# Patient Record
Sex: MALE | Race: WHITE | NOT HISPANIC OR LATINO | Employment: FULL TIME | URBAN - METROPOLITAN AREA
[De-identification: names, ages, dates, MRNs, and addresses within clinical notes are randomized per-mention and may not be internally consistent; named-entity substitution may affect disease eponyms.]

---

## 2018-02-26 RX ORDER — MONTELUKAST SODIUM 10 MG/1
10 TABLET ORAL NIGHTLY
Qty: 90 TABLET | Refills: 0 | Status: SHIPPED | OUTPATIENT
Start: 2018-02-26 | End: 2018-03-28

## 2018-02-27 ENCOUNTER — TELEPHONE (OUTPATIENT)
Dept: INTERNAL MEDICINE | Facility: CLINIC | Age: 53
End: 2018-02-27

## 2018-02-27 NOTE — TELEPHONE ENCOUNTER
----- Message from Kyle Burkett MA sent at 2/27/2018 12:21 PM CST -----      ----- Message -----  From: Tomeka Tong  Sent: 2/27/2018  10:23 AM  To: Jovanny Arredondo Staff    Colonoscopy 2/27/18

## 2018-03-06 ENCOUNTER — TELEPHONE (OUTPATIENT)
Dept: INTERNAL MEDICINE | Facility: CLINIC | Age: 53
End: 2018-03-06

## 2018-03-21 ENCOUNTER — TELEPHONE (OUTPATIENT)
Dept: INTERNAL MEDICINE | Facility: CLINIC | Age: 53
End: 2018-03-21

## 2018-03-21 RX ORDER — OSELTAMIVIR PHOSPHATE 75 MG/1
75 CAPSULE ORAL DAILY
Qty: 10 CAPSULE | Refills: 0 | Status: SHIPPED | OUTPATIENT
Start: 2018-03-21 | End: 2018-03-27

## 2018-03-21 NOTE — TELEPHONE ENCOUNTER
Patient daughter has the flu and he [place] and want to know if you would send him a rx to [facility] in [place] for tamiflu.

## 2018-03-26 RX ORDER — FLUTICASONE PROPIONATE 50 MCG
SPRAY, SUSPENSION (ML) NASAL
Refills: 1 | COMMUNITY
Start: 2018-01-08 | End: 2019-03-04 | Stop reason: SDUPTHER

## 2018-03-26 RX ORDER — SIMVASTATIN 40 MG/1
40 TABLET, FILM COATED ORAL NIGHTLY
COMMUNITY
End: 2018-03-27 | Stop reason: SDUPTHER

## 2018-03-26 RX ORDER — MULTIVITAMIN
1 TABLET ORAL DAILY
COMMUNITY
End: 2021-05-27

## 2018-03-26 RX ORDER — OMEGA-3-ACID ETHYL ESTERS 1 G/1
1 CAPSULE, LIQUID FILLED ORAL DAILY
COMMUNITY
Start: 2018-03-03 | End: 2018-07-19 | Stop reason: SDUPTHER

## 2018-03-26 RX ORDER — MINERAL OIL
180 ENEMA (ML) RECTAL DAILY
COMMUNITY
End: 2020-07-24 | Stop reason: SDUPTHER

## 2018-03-26 RX ORDER — GUAIFENESIN/PHENYLPROPANOLAMIN
1000 EXPECTORANT ORAL DAILY
COMMUNITY
End: 2018-09-17

## 2018-03-27 ENCOUNTER — OFFICE VISIT (OUTPATIENT)
Dept: INTERNAL MEDICINE | Facility: CLINIC | Age: 53
End: 2018-03-27
Payer: COMMERCIAL

## 2018-03-27 VITALS
OXYGEN SATURATION: 95 % | WEIGHT: 202 LBS | SYSTOLIC BLOOD PRESSURE: 120 MMHG | TEMPERATURE: 98 F | HEIGHT: 65 IN | BODY MASS INDEX: 33.66 KG/M2 | DIASTOLIC BLOOD PRESSURE: 80 MMHG | HEART RATE: 68 BPM

## 2018-03-27 DIAGNOSIS — R73.01 IMPAIRED FASTING GLUCOSE: ICD-10-CM

## 2018-03-27 DIAGNOSIS — Z23 NEED FOR PROPHYLACTIC VACCINATION WITH COMBINED DIPHTHERIA-TETANUS-PERTUSSIS (DTP) VACCINE: ICD-10-CM

## 2018-03-27 DIAGNOSIS — E78.2 MIXED HYPERLIPIDEMIA: Primary | ICD-10-CM

## 2018-03-27 DIAGNOSIS — R74.8 ABNORMAL LIVER ENZYMES: ICD-10-CM

## 2018-03-27 PROBLEM — I10 HYPERTENSION: Status: ACTIVE | Noted: 2018-03-27

## 2018-03-27 PROBLEM — E78.5 HYPERLIPIDEMIA: Status: ACTIVE | Noted: 2018-03-27

## 2018-03-27 PROBLEM — J30.9 ALLERGIC RHINITIS: Status: ACTIVE | Noted: 2018-03-27

## 2018-03-27 PROBLEM — N41.9 PROSTATITIS: Status: RESOLVED | Noted: 2018-03-27 | Resolved: 2018-03-27

## 2018-03-27 PROBLEM — I10 HYPERTENSION: Status: RESOLVED | Noted: 2018-03-27 | Resolved: 2018-03-27

## 2018-03-27 PROCEDURE — 90471 IMMUNIZATION ADMIN: CPT | Mod: ,,, | Performed by: INTERNAL MEDICINE

## 2018-03-27 PROCEDURE — 90715 TDAP VACCINE 7 YRS/> IM: CPT | Mod: ,,, | Performed by: INTERNAL MEDICINE

## 2018-03-27 PROCEDURE — 99213 OFFICE O/P EST LOW 20 MIN: CPT | Mod: 25,,, | Performed by: INTERNAL MEDICINE

## 2018-03-27 RX ORDER — SIMVASTATIN 40 MG/1
40 TABLET, FILM COATED ORAL NIGHTLY
Qty: 90 TABLET | Refills: 1 | Status: SHIPPED | OUTPATIENT
Start: 2018-03-27 | End: 2018-12-10 | Stop reason: SDUPTHER

## 2018-03-27 NOTE — PROGRESS NOTES
Subjective:       Patient ID: Neo Aguilar is a 52 y.o. male.    Chief Complaint: Establish Care (continuity of care, med refill); Hyperlipidemia; and Hypertension    Hyperlipidemia   This is a chronic problem. The current episode started more than 1 year ago. The problem is controlled. Recent lipid tests were reviewed and are normal. Pertinent negatives include no chest pain, myalgias or shortness of breath. Current antihyperlipidemic treatment includes statins. The current treatment provides significant improvement of lipids. There are no compliance problems.  Risk factors for coronary artery disease include male sex, obesity and dyslipidemia.     Review of Systems   Constitutional: Negative for chills, diaphoresis, fatigue, fever and unexpected weight change.   HENT: Positive for voice change. Negative for congestion, ear discharge, ear pain, hearing loss, postnasal drip, rhinorrhea and trouble swallowing.    Eyes: Negative for photophobia, pain, discharge, redness, itching and visual disturbance.   Respiratory: Negative for apnea, cough, choking, chest tightness, shortness of breath and wheezing.    Cardiovascular: Negative for chest pain, palpitations, orthopnea, leg swelling and PND.   Gastrointestinal: Negative for abdominal pain, blood in stool, constipation, diarrhea, nausea, rectal pain and vomiting.   Endocrine: Negative for cold intolerance, heat intolerance, polydipsia and polyuria.   Genitourinary: Negative for decreased urine volume, difficulty urinating, discharge, dysuria, flank pain, frequency, genital sores, hematuria, penile pain, penile swelling, scrotal swelling, testicular pain and urgency.   Musculoskeletal: Negative for arthralgias, back pain, gait problem, joint swelling, myalgias, neck pain and neck stiffness.   Skin: Negative for color change, rash and wound.   Allergic/Immunologic: Negative for environmental allergies and food allergies.   Neurological: Negative for dizziness,  tremors, seizures, syncope, facial asymmetry, speech difficulty, weakness, light-headedness, numbness and headaches.   Hematological: Negative for adenopathy. Does not bruise/bleed easily.   Psychiatric/Behavioral: Negative for confusion, hallucinations, sleep disturbance and suicidal ideas. The patient is not nervous/anxious.        Past Medical History:   Diagnosis Date    Abnormal liver enzymes     Allergic rhinitis     Hyperlipidemia     Hypertension     Prostatitis       Past Surgical History:   Procedure Laterality Date    COLONOSCOPY  02/27/2018       Family History   Problem Relation Age of Onset    Coronary artery disease Father      >55    Cystic fibrosis Son        Social History     Social History    Marital status:      Spouse name: N/A    Number of children: N/A    Years of education: N/A     Occupational History    offshore       Social History Main Topics    Smoking status: Former Smoker    Smokeless tobacco: Never Used    Alcohol use Yes      Comment: occasional    Drug use: No    Sexual activity: Yes     Partners: Female      Comment:      Other Topics Concern    None     Social History Narrative    Live with wife       Current Outpatient Prescriptions   Medication Sig Dispense Refill    fexofenadine (ALLEGRA) 180 MG tablet Take 180 mg by mouth once daily.      fluticasone (FLONASE) 50 mcg/actuation nasal spray INHALE 1 SPRAY(S) EVERY DAY BY INTRANASAL ROUTE.  1    Lactobacillus rhamnosus GG (CULTURELLE) 10 billion cell capsule Take 1 capsule by mouth once daily.      montelukast (SINGULAIR) 10 mg tablet Take 1 tablet (10 mg total) by mouth every evening. 90 tablet 0    multivitamin (ONE DAILY MULTIVITAMIN) per tablet Take 1 tablet by mouth once daily.      omega-3 acid ethyl esters (LOVAZA) 1 gram capsule Take 1 capsule by mouth once daily.      saw palmetto 500 MG capsule Take 1,000 mg by mouth once daily.      simvastatin (ZOCOR) 40 MG tablet  "Take 1 tablet (40 mg total) by mouth every evening. 90 tablet 1     No current facility-administered medications for this visit.        Review of patient's allergies indicates:  No Known Allergies  Objective:    HPI     Establish Care    Additional comments: continuity of care, med refill       Last edited by Kyle Burkett MA on 3/27/2018  9:11 AM. (History)      Blood pressure 120/80, pulse 68, temperature 98.2 °F (36.8 °C), temperature source Temporal, height 5' 5" (1.651 m), weight 91.6 kg (202 lb), SpO2 95 %. Body mass index is 33.61 kg/m².   Physical Exam   Constitutional: He appears well-developed. No distress.   HENT:   Nose: Nose normal.   Mouth/Throat: Oropharynx is clear and moist.   Eyes: Conjunctivae are normal. Right eye exhibits no discharge. Left eye exhibits no discharge. No scleral icterus.   Neck: Carotid bruit is not present.   Cardiovascular: Normal rate, regular rhythm and normal heart sounds.    No murmur heard.  Pulmonary/Chest: Effort normal and breath sounds normal. No respiratory distress. He has no decreased breath sounds. He has no wheezes. He has no rhonchi. He has no rales.   Abdominal: Soft. He exhibits no distension. There is no tenderness. There is no rebound and no guarding.   Musculoskeletal: He exhibits no edema.   Neurological: He is alert. He displays no tremor.   Skin: Skin is warm and dry.   Psychiatric: He has a normal mood and affect. His speech is normal.   Nursing note and vitals reviewed.          Assessment:       1. Mixed hyperlipidemia    2. Impaired fasting glucose    3. Abnormal liver enzymes    4. Need for prophylactic vaccination with combined diphtheria-tetanus-pertussis (DTP) vaccine        Plan:       Neo was seen today for establish care, hyperlipidemia and hypertension.    Diagnoses and all orders for this visit:    Mixed hyperlipidemia  -     simvastatin (ZOCOR) 40 MG tablet; Take 1 tablet (40 mg total) by mouth every evening.  -     Comprehensive " metabolic panel; Future  -     Lipid panel; Future  -     Comprehensive metabolic panel  -     Lipid panel    Impaired fasting glucose  Comments:  Has already made some diet changes.  Recheck with A1C next visit    Orders:  -     Hemoglobin A1c; Future  -     Hemoglobin A1c    Abnormal liver enzymes  Comments:  Mildly elevated for years.Probably medication or fatty liver related.  In review of old chart, I don't see a hepatitis screen so will plan that with next labs    Orders:  -     Hepatitis panel, acute; Future  -     Hepatitis panel, acute    Need for prophylactic vaccination with combined diphtheria-tetanus-pertussis (DTP) vaccine  -     Tdap Vaccine

## 2018-07-19 RX ORDER — OMEGA-3-ACID ETHYL ESTERS 1 G/1
2 CAPSULE, LIQUID FILLED ORAL 2 TIMES DAILY
Qty: 120 CAPSULE | Refills: 6 | Status: SHIPPED | OUTPATIENT
Start: 2018-07-19 | End: 2018-08-13 | Stop reason: SDUPTHER

## 2018-07-24 ENCOUNTER — TELEPHONE (OUTPATIENT)
Dept: INTERNAL MEDICINE | Facility: CLINIC | Age: 53
End: 2018-07-24

## 2018-07-24 LAB
ALBUMIN SERPL-MCNC: 4.8 G/DL (ref 3.5–5.5)
ALBUMIN/GLOB SERPL: 2 {RATIO} (ref 1.2–2.2)
ALP SERPL-CCNC: 75 IU/L (ref 39–117)
ALT SERPL-CCNC: 44 IU/L (ref 0–44)
AST SERPL-CCNC: 28 IU/L (ref 0–40)
BILIRUB SERPL-MCNC: 0.6 MG/DL (ref 0–1.2)
BUN SERPL-MCNC: 16 MG/DL (ref 6–24)
BUN/CREAT SERPL: 15 (ref 9–20)
CALCIUM SERPL-MCNC: 9.3 MG/DL (ref 8.7–10.2)
CHLORIDE SERPL-SCNC: 103 MMOL/L (ref 96–106)
CHOLEST SERPL-MCNC: 146 MG/DL (ref 100–199)
CO2 SERPL-SCNC: 26 MMOL/L (ref 20–29)
CREAT SERPL-MCNC: 1.06 MG/DL (ref 0.76–1.27)
EGFR IF AFRICAN AMERICAN: 93 ML/MIN/1.73
EST. GFR  (NON AFRICAN AMERICAN): 81 ML/MIN/1.73
GLOBULIN SER CALC-MCNC: 2.4 G/DL (ref 1.5–4.5)
GLUCOSE SERPL-MCNC: 94 MG/DL (ref 65–99)
HAV IGM SERPL QL IA: NEGATIVE
HBA1C MFR BLD: 5.6 % (ref 4.8–5.6)
HBV CORE IGM SERPL QL IA: NEGATIVE
HBV SURFACE AG SERPL QL IA: NEGATIVE
HCV AB S/CO SERPL IA: <0.1 S/CO RATIO (ref 0–0.9)
HDLC SERPL-MCNC: 44 MG/DL
LDLC SERPL CALC-MCNC: 70 MG/DL (ref 0–99)
POTASSIUM SERPL-SCNC: 4.9 MMOL/L (ref 3.5–5.2)
PROT SERPL-MCNC: 7.2 G/DL (ref 6–8.5)
SODIUM SERPL-SCNC: 145 MMOL/L (ref 134–144)
TRIGL SERPL-MCNC: 158 MG/DL (ref 0–149)
VLDLC SERPL CALC-MCNC: 32 MG/DL (ref 5–40)

## 2018-07-24 NOTE — TELEPHONE ENCOUNTER
----- Message from Jovanny Arredondo Jr., MD sent at 7/24/2018  2:17 PM CDT -----  I have reviewed your results.  They demonstrate no abnormal findings.  If you have any additional concerns regarding these tests, please contact me at your convenience.

## 2018-08-13 ENCOUNTER — OFFICE VISIT (OUTPATIENT)
Dept: INTERNAL MEDICINE | Facility: CLINIC | Age: 53
End: 2018-08-13
Payer: COMMERCIAL

## 2018-08-13 VITALS
SYSTOLIC BLOOD PRESSURE: 150 MMHG | DIASTOLIC BLOOD PRESSURE: 108 MMHG | OXYGEN SATURATION: 98 % | BODY MASS INDEX: 35.32 KG/M2 | TEMPERATURE: 98 F | HEIGHT: 65 IN | HEART RATE: 73 BPM | WEIGHT: 212 LBS

## 2018-08-13 DIAGNOSIS — R03.0 ELEVATED BLOOD PRESSURE READING IN OFFICE WITHOUT DIAGNOSIS OF HYPERTENSION: ICD-10-CM

## 2018-08-13 DIAGNOSIS — E66.9 CLASS 2 OBESITY WITH BODY MASS INDEX (BMI) OF 35.0 TO 35.9 IN ADULT, UNSPECIFIED OBESITY TYPE, UNSPECIFIED WHETHER SERIOUS COMORBIDITY PRESENT: ICD-10-CM

## 2018-08-13 DIAGNOSIS — E78.2 MIXED HYPERLIPIDEMIA: Primary | ICD-10-CM

## 2018-08-13 DIAGNOSIS — N40.1 BENIGN PROSTATIC HYPERPLASIA WITH NOCTURIA: ICD-10-CM

## 2018-08-13 DIAGNOSIS — R35.1 BENIGN PROSTATIC HYPERPLASIA WITH NOCTURIA: ICD-10-CM

## 2018-08-13 PROCEDURE — 3080F DIAST BP >= 90 MM HG: CPT | Mod: ,,, | Performed by: INTERNAL MEDICINE

## 2018-08-13 PROCEDURE — 99214 OFFICE O/P EST MOD 30 MIN: CPT | Mod: ,,, | Performed by: INTERNAL MEDICINE

## 2018-08-13 PROCEDURE — 3077F SYST BP >= 140 MM HG: CPT | Mod: ,,, | Performed by: INTERNAL MEDICINE

## 2018-08-13 PROCEDURE — 3008F BODY MASS INDEX DOCD: CPT | Mod: ,,, | Performed by: INTERNAL MEDICINE

## 2018-08-13 RX ORDER — MONTELUKAST SODIUM 10 MG/1
10 TABLET ORAL NIGHTLY
Qty: 90 TABLET | Refills: 1 | Status: SHIPPED | OUTPATIENT
Start: 2018-08-13 | End: 2019-02-27 | Stop reason: SDUPTHER

## 2018-08-13 RX ORDER — OMEGA-3-ACID ETHYL ESTERS 1 G/1
2 CAPSULE, LIQUID FILLED ORAL 2 TIMES DAILY
Qty: 120 CAPSULE | Refills: 6 | Status: SHIPPED | OUTPATIENT
Start: 2018-08-13 | End: 2018-12-03 | Stop reason: SDUPTHER

## 2018-08-13 RX ORDER — TAMSULOSIN HYDROCHLORIDE 0.4 MG/1
0.4 CAPSULE ORAL DAILY
Qty: 30 CAPSULE | Refills: 3 | Status: SHIPPED | OUTPATIENT
Start: 2018-08-13 | End: 2018-09-17

## 2018-08-13 RX ORDER — MONTELUKAST SODIUM 10 MG/1
10 TABLET ORAL NIGHTLY
COMMUNITY
End: 2018-08-13 | Stop reason: SDUPTHER

## 2018-08-13 NOTE — PROGRESS NOTES
Subjective:       Patient ID: Neo Aguilar is a 53 y.o. male.    Chief Complaint: Hyperlipidemia (continuity of care  and med refill)            Hyperlipidemia   This is a chronic problem. The current episode started more than 1 year ago. The problem is controlled. Recent lipid tests were reviewed and are normal. Pertinent negatives include no chest pain, myalgias or shortness of breath. Current antihyperlipidemic treatment includes statins and diet change (fish oil). The current treatment provides significant improvement of lipids. There are no compliance problems.  Risk factors for coronary artery disease include male sex, obesity and dyslipidemia.   Benign Prostatic Hypertrophy   This is a chronic problem. The current episode started more than 1 year ago. The problem has been gradually worsening since onset. Irritative symptoms include nocturia (3-4 times per night) and urgency. Irritative symptoms do not include frequency. Obstructive symptoms include incomplete emptying, a slower stream and a weak stream. Obstructive symptoms do not include dribbling, an intermittent stream or straining. Pertinent negatives include no chills, dysuria, hematuria, hesitancy, nausea or vomiting. AUA score is 8-19. He is sexually active. The symptoms are aggravated by caffeine (tea). Treatments tried: saw palmetto. The treatment provided mild relief.     Review of Systems   Constitutional: Negative for chills, fatigue, fever and unexpected weight change.   HENT: Negative for congestion, hearing loss, postnasal drip, rhinorrhea, trouble swallowing and voice change.    Eyes: Negative for photophobia and visual disturbance.   Respiratory: Negative for apnea, cough, choking, chest tightness, shortness of breath and wheezing.    Cardiovascular: Negative for chest pain, palpitations and leg swelling.   Gastrointestinal: Negative for abdominal pain, blood in stool, constipation, diarrhea, nausea, rectal pain and vomiting.   Endocrine:  Negative for cold intolerance, heat intolerance, polydipsia and polyuria.   Genitourinary: Positive for incomplete emptying, nocturia (3-4 times per night) and urgency. Negative for decreased urine volume, difficulty urinating, discharge, dysuria, flank pain, frequency, genital sores, hematuria, hesitancy and testicular pain.   Musculoskeletal: Negative for arthralgias, back pain, gait problem, joint swelling, myalgias and neck pain.   Skin: Negative for color change, rash and wound.   Allergic/Immunologic: Negative for environmental allergies and food allergies.   Neurological: Negative for dizziness, tremors, seizures, syncope, facial asymmetry, speech difficulty, weakness, light-headedness, numbness and headaches.   Hematological: Negative for adenopathy. Does not bruise/bleed easily.   Psychiatric/Behavioral: Negative for confusion, hallucinations, sleep disturbance and suicidal ideas. The patient is not nervous/anxious.        Past Medical History:   Diagnosis Date    Abnormal liver enzymes     Allergic rhinitis     Hyperlipidemia     Hypertension     Prostatitis       Past Surgical History:   Procedure Laterality Date    COLONOSCOPY  02/27/2018       Family History   Problem Relation Age of Onset    Coronary artery disease Father         >55    Cystic fibrosis Son        Social History     Socioeconomic History    Marital status:      Spouse name: None    Number of children: None    Years of education: None    Highest education level: None   Social Needs    Financial resource strain: None    Food insecurity - worry: None    Food insecurity - inability: None    Transportation needs - medical: None    Transportation needs - non-medical: None   Occupational History    Occupation: offshore    Tobacco Use    Smoking status: Former Smoker    Smokeless tobacco: Never Used   Substance and Sexual Activity    Alcohol use: Yes     Comment: occasional    Drug use: No    Sexual  "activity: Yes     Partners: Female     Comment:    Other Topics Concern    None   Social History Narrative    Live with wife       Current Outpatient Medications   Medication Sig Dispense Refill    fexofenadine (ALLEGRA) 180 MG tablet Take 180 mg by mouth once daily.      fluticasone (FLONASE) 50 mcg/actuation nasal spray INHALE 1 SPRAY(S) EVERY DAY BY INTRANASAL ROUTE.  1    Lactobacillus rhamnosus GG (CULTURELLE) 10 billion cell capsule Take 1 capsule by mouth once daily.      montelukast (SINGULAIR) 10 mg tablet Take 1 tablet (10 mg total) by mouth every evening. 90 tablet 1    multivitamin (ONE DAILY MULTIVITAMIN) per tablet Take 1 tablet by mouth once daily.      omega-3 acid ethyl esters (LOVAZA) 1 gram capsule Take 2 capsules (2 g total) by mouth 2 (two) times daily. 120 capsule 6    saw palmetto 500 MG capsule Take 1,000 mg by mouth once daily.      simvastatin (ZOCOR) 40 MG tablet Take 1 tablet (40 mg total) by mouth every evening. 90 tablet 1    tamsulosin (FLOMAX) 0.4 mg Cap Take 1 capsule (0.4 mg total) by mouth once daily. 30 capsule 3     No current facility-administered medications for this visit.        Review of patient's allergies indicates:  No Known Allergies  Objective:    HPI     Hyperlipidemia      Additional comments: continuity of care  and med refill          Last edited by Kyle Burkett MA on 8/13/2018  9:29 AM. (History)      Blood pressure (!) 150/108, pulse 73, temperature 97.9 °F (36.6 °C), temperature source Temporal, height 5' 5" (1.651 m), weight 96.2 kg (212 lb), SpO2 98 %. Body mass index is 35.28 kg/m².   Physical Exam   Constitutional: He appears well-developed. No distress.   Obese     HENT:   Nose: Nose normal.   Mouth/Throat: Oropharynx is clear and moist.   Eyes: Conjunctivae are normal. Right eye exhibits no discharge. Left eye exhibits no discharge. No scleral icterus.   Neck: Carotid bruit is not present.   Cardiovascular: Normal rate, regular rhythm " and normal heart sounds.   No murmur heard.  Pulmonary/Chest: Effort normal and breath sounds normal. No respiratory distress. He has no decreased breath sounds. He has no wheezes. He has no rhonchi. He has no rales.   Abdominal: Soft. He exhibits no distension. There is no tenderness. There is no rebound and no guarding.   Musculoskeletal: He exhibits no edema.   Neurological: He is alert. He displays no tremor.   Skin: Skin is warm and dry.   Psychiatric: He has a normal mood and affect. His speech is normal.   Nursing note and vitals reviewed.        No visits with results within 1 Month(s) from this visit.   Latest known visit with results is:   Office Visit on 03/27/2018   Component Date Value Ref Range Status    Hep A IgM 07/23/2018 Negative  Negative Final    Hepatitis B Surface Ag 07/23/2018 Negative  Negative Final    Hep B C IgM 07/23/2018 Negative  Negative Final    Hep C Virus Ab Signal/Cutoff 07/23/2018 <0.1  0.0 - 0.9 s/co ratio Final    Comment:                                   Negative:     < 0.8                               Indeterminate: 0.8 - 0.9                                    Positive:     > 0.9   The CDC recommends that a positive HCV antibody result   be followed up with a HCV Nucleic Acid Amplification   test (560159).      Glucose 07/23/2018 94  65 - 99 mg/dL Final    BUN, Bld 07/23/2018 16  6 - 24 mg/dL Final    Creatinine 07/23/2018 1.06  0.76 - 1.27 mg/dL Final    eGFR if non  07/23/2018 81  >59 mL/min/1.73 Final    eGFR if  07/23/2018 93  >59 mL/min/1.73 Final    BUN/Creatinine Ratio 07/23/2018 15  9 - 20 Final    Sodium 07/23/2018 145* 134 - 144 mmol/L Final    Potassium 07/23/2018 4.9  3.5 - 5.2 mmol/L Final    Chloride 07/23/2018 103  96 - 106 mmol/L Final    CO2 07/23/2018 26  20 - 29 mmol/L Final    Calcium 07/23/2018 9.3  8.7 - 10.2 mg/dL Final    Total Protein 07/23/2018 7.2  6.0 - 8.5 g/dL Final    Albumin 07/23/2018 4.8  3.5  - 5.5 g/dL Final    Globulin, Total 07/23/2018 2.4  1.5 - 4.5 g/dL Final    Albumin/Globulin Ratio 07/23/2018 2.0  1.2 - 2.2 Final    Total Bilirubin 07/23/2018 0.6  0.0 - 1.2 mg/dL Final    Alkaline Phosphatase 07/23/2018 75  39 - 117 IU/L Final    AST 07/23/2018 28  0 - 40 IU/L Final    ALT 07/23/2018 44  0 - 44 IU/L Final    Cholesterol 07/23/2018 146  100 - 199 mg/dL Final    Triglycerides 07/23/2018 158* 0 - 149 mg/dL Final    HDL 07/23/2018 44  >39 mg/dL Final    VLDL Cholesterol Abdirahman 07/23/2018 32  5 - 40 mg/dL Final    LDL Calculated 07/23/2018 70  0 - 99 mg/dL Final    Hemoglobin A1C 07/23/2018 5.6  4.8 - 5.6 % Final    Comment:          Pre-diabetes: 5.7 - 6.4           Diabetes: >6.4           Glycemic control for adults with diabetes: <7.0     ]    Assessment:       1. Mixed hyperlipidemia    2. Benign prostatic hyperplasia with nocturia    3. Elevated blood pressure reading in office without diagnosis of hypertension    4. Class 2 obesity with body mass index (BMI) of 35.0 to 35.9 in adult, unspecified obesity type, unspecified whether serious comorbidity present        Plan:       Neo was seen today for hyperlipidemia.    Diagnoses and all orders for this visit:    Mixed hyperlipidemia  -     omega-3 acid ethyl esters (LOVAZA) 1 gram capsule; Take 2 capsules (2 g total) by mouth 2 (two) times daily.    Benign prostatic hyperplasia with nocturia  -     tamsulosin (FLOMAX) 0.4 mg Cap; Take 1 capsule (0.4 mg total) by mouth once daily.  -     Prostate Specific Antigen, Diagnostic; Future  -     Prostate Specific Antigen, Diagnostic    Elevated blood pressure reading in office without diagnosis of hypertension  Comments:  Has never been elevated in the past.  Low salt diet.     Class 2 obesity with body mass index (BMI) of 35.0 to 35.9 in adult, unspecified obesity type, unspecified whether serious comorbidity present  Comments:   Discussed weight loss; up 10 pounds since last visit.      Other orders  -     montelukast (SINGULAIR) 10 mg tablet; Take 1 tablet (10 mg total) by mouth every evening.

## 2018-08-15 LAB — PSA SERPL-MCNC: 1 NG/ML (ref 0–4)

## 2018-09-05 ENCOUNTER — TELEPHONE (OUTPATIENT)
Dept: FAMILY MEDICINE | Facility: CLINIC | Age: 53
End: 2018-09-05

## 2018-09-17 ENCOUNTER — OFFICE VISIT (OUTPATIENT)
Dept: FAMILY MEDICINE | Facility: CLINIC | Age: 53
End: 2018-09-17
Payer: COMMERCIAL

## 2018-09-17 VITALS
OXYGEN SATURATION: 98 % | DIASTOLIC BLOOD PRESSURE: 100 MMHG | SYSTOLIC BLOOD PRESSURE: 148 MMHG | BODY MASS INDEX: 32.15 KG/M2 | TEMPERATURE: 98 F | HEIGHT: 65 IN | WEIGHT: 193 LBS | HEART RATE: 81 BPM

## 2018-09-17 DIAGNOSIS — I10 ESSENTIAL HYPERTENSION: Primary | ICD-10-CM

## 2018-09-17 PROCEDURE — 3008F BODY MASS INDEX DOCD: CPT | Mod: ,,, | Performed by: INTERNAL MEDICINE

## 2018-09-17 PROCEDURE — 3077F SYST BP >= 140 MM HG: CPT | Mod: ,,, | Performed by: INTERNAL MEDICINE

## 2018-09-17 PROCEDURE — 3080F DIAST BP >= 90 MM HG: CPT | Mod: ,,, | Performed by: INTERNAL MEDICINE

## 2018-09-17 PROCEDURE — 99213 OFFICE O/P EST LOW 20 MIN: CPT | Mod: ,,, | Performed by: INTERNAL MEDICINE

## 2018-09-17 RX ORDER — ASPIRIN 81 MG/1
81 TABLET ORAL DAILY
Refills: 0 | COMMUNITY
Start: 2018-09-17 | End: 2024-01-18

## 2018-09-17 RX ORDER — LISINOPRIL 20 MG/1
20 TABLET ORAL DAILY
Qty: 90 TABLET | Refills: 1 | Status: SHIPPED | OUTPATIENT
Start: 2018-09-17 | End: 2018-12-10 | Stop reason: SDUPTHER

## 2018-09-17 NOTE — PROGRESS NOTES
Subjective:       Patient ID: Neo Aguilar Jr. is a 53 y.o. male.    Chief Complaint: Hypertension (continuity of care patient need a letter clearing him to to exercise program) and Arm Pain (right elbow pain)    Here for follow up on his blood pressure.  He has been working on diet and exercise and has lost 19 pounds since last visit.  He mostly has been walking but recently met with a .  She put him through a more intense workout and he reports he got dizzy and felt like he might pass out.   He didn't check his blood pressure at that time but has been monitoring at home; typically 130s/90s.         Review of Systems   Constitutional: Negative for chills, fatigue, fever and unexpected weight change.   HENT: Negative for congestion, hearing loss, postnasal drip, rhinorrhea, trouble swallowing and voice change.    Eyes: Negative for photophobia and visual disturbance.   Respiratory: Negative for apnea, cough, choking, chest tightness, shortness of breath and wheezing.    Cardiovascular: Negative for chest pain, palpitations and leg swelling.   Gastrointestinal: Negative for abdominal pain, blood in stool, constipation, diarrhea, nausea, rectal pain and vomiting.   Endocrine: Negative for cold intolerance, heat intolerance, polydipsia and polyuria.   Genitourinary: Positive for frequency (drinking alot of water). Negative for decreased urine volume, difficulty urinating, discharge, dysuria, flank pain, genital sores, hematuria, testicular pain and urgency.   Musculoskeletal: Positive for arthralgias (right elbow). Negative for back pain, gait problem, joint swelling, myalgias and neck pain.   Skin: Negative for color change, rash and wound.   Allergic/Immunologic: Negative for environmental allergies and food allergies.   Neurological: Positive for light-headedness and headaches. Negative for dizziness, tremors, seizures, syncope, facial asymmetry, speech difficulty, weakness and numbness.    Hematological: Negative for adenopathy. Does not bruise/bleed easily.   Psychiatric/Behavioral: Negative for confusion, hallucinations, sleep disturbance and suicidal ideas. The patient is not nervous/anxious.        Past Medical History:   Diagnosis Date    Abnormal liver enzymes     Allergic rhinitis     Hyperlipidemia     Hypertension     Prostatitis       Past Surgical History:   Procedure Laterality Date    COLONOSCOPY  02/27/2018       Family History   Problem Relation Age of Onset    Coronary artery disease Father         >55    Cystic fibrosis Son        Social History     Socioeconomic History    Marital status:      Spouse name: None    Number of children: None    Years of education: None    Highest education level: None   Social Needs    Financial resource strain: None    Food insecurity - worry: None    Food insecurity - inability: None    Transportation needs - medical: None    Transportation needs - non-medical: None   Occupational History    Occupation: offshore    Tobacco Use    Smoking status: Former Smoker    Smokeless tobacco: Never Used   Substance and Sexual Activity    Alcohol use: Yes     Comment: occasional    Drug use: No    Sexual activity: Yes     Partners: Female     Comment:    Other Topics Concern    None   Social History Narrative    Live with wife       Current Outpatient Medications   Medication Sig Dispense Refill    fexofenadine (ALLEGRA) 180 MG tablet Take 180 mg by mouth once daily.      fluticasone (FLONASE) 50 mcg/actuation nasal spray INHALE 1 SPRAY(S) EVERY DAY BY INTRANASAL ROUTE.  1    Lactobacillus rhamnosus GG (CULTURELLE) 10 billion cell capsule Take 1 capsule by mouth once daily.      montelukast (SINGULAIR) 10 mg tablet Take 1 tablet (10 mg total) by mouth every evening. 90 tablet 1    multivitamin (ONE DAILY MULTIVITAMIN) per tablet Take 1 tablet by mouth once daily.      omega-3 acid ethyl esters (LOVAZA) 1  "gram capsule Take 2 capsules (2 g total) by mouth 2 (two) times daily. 120 capsule 6    simvastatin (ZOCOR) 40 MG tablet Take 1 tablet (40 mg total) by mouth every evening. 90 tablet 1    aspirin (ECOTRIN) 81 MG EC tablet Take 1 tablet (81 mg total) by mouth once daily.  0    lisinopril (PRINIVIL,ZESTRIL) 20 MG tablet Take 1 tablet (20 mg total) by mouth once daily. 90 tablet 1     No current facility-administered medications for this visit.        Review of patient's allergies indicates:  No Known Allergies  Objective:    HPI     Hypertension      Additional comments: continuity of care patient need a letter clearing   him to to exercise program              Arm Pain      Additional comments: right elbow pain          Last edited by Kyle Burkett MA on 9/17/2018 11:09 AM. (History)      Blood pressure (!) 148/100, pulse 81, temperature 98 °F (36.7 °C), temperature source Temporal, height 5' 5" (1.651 m), weight 87.5 kg (193 lb), SpO2 98 %. Body mass index is 32.12 kg/m².   Physical Exam   Constitutional: He appears well-developed. No distress.   Obese     HENT:   Nose: Nose normal.   Mouth/Throat: Oropharynx is clear and moist.   Eyes: Conjunctivae are normal. Right eye exhibits no discharge. Left eye exhibits no discharge. No scleral icterus.   Neck: Carotid bruit is not present.   Cardiovascular: Normal rate, regular rhythm and normal heart sounds.   No murmur heard.  Pulmonary/Chest: Effort normal and breath sounds normal. No respiratory distress. He has no decreased breath sounds. He has no wheezes. He has no rhonchi. He has no rales.   Abdominal: Soft. He exhibits no distension. There is no tenderness. There is no rebound and no guarding.   Musculoskeletal: He exhibits no edema.   Neurological: He is alert. He displays no tremor.   Skin: Skin is warm and dry.   Psychiatric: He has a normal mood and affect. His speech is normal.   Nursing note and vitals reviewed.          Assessment:       1. Essential " hypertension        Plan:       Neo was seen today for hypertension and arm pain.    Diagnoses and all orders for this visit:    Essential hypertension  Comments:  Hold off on higher intensity exercise until BP better controlled.   Orders:  -     lisinopril (PRINIVIL,ZESTRIL) 20 MG tablet; Take 1 tablet (20 mg total) by mouth once daily.  -     aspirin (ECOTRIN) 81 MG EC tablet; Take 1 tablet (81 mg total) by mouth once daily.

## 2018-11-02 ENCOUNTER — OFFICE VISIT (OUTPATIENT)
Dept: FAMILY MEDICINE | Facility: CLINIC | Age: 53
End: 2018-11-02
Payer: COMMERCIAL

## 2018-11-02 VITALS
WEIGHT: 188 LBS | TEMPERATURE: 98 F | BODY MASS INDEX: 31.32 KG/M2 | SYSTOLIC BLOOD PRESSURE: 138 MMHG | DIASTOLIC BLOOD PRESSURE: 92 MMHG | OXYGEN SATURATION: 98 % | HEIGHT: 65 IN | HEART RATE: 65 BPM

## 2018-11-02 DIAGNOSIS — I10 ESSENTIAL HYPERTENSION: Primary | ICD-10-CM

## 2018-11-02 PROCEDURE — 99212 OFFICE O/P EST SF 10 MIN: CPT | Mod: ,,, | Performed by: INTERNAL MEDICINE

## 2018-11-02 PROCEDURE — 3080F DIAST BP >= 90 MM HG: CPT | Mod: ,,, | Performed by: INTERNAL MEDICINE

## 2018-11-02 PROCEDURE — 3075F SYST BP GE 130 - 139MM HG: CPT | Mod: ,,, | Performed by: INTERNAL MEDICINE

## 2018-11-02 PROCEDURE — 3008F BODY MASS INDEX DOCD: CPT | Mod: ,,, | Performed by: INTERNAL MEDICINE

## 2018-11-02 NOTE — PATIENT INSTRUCTIONS
Jovanny Arredondo Jr, MD  [facility]  [address]  [place] [address]  Dept: [phone]  Dept Fax: [phone]                                                                                                    Re:       Patient: Neo Aguilar Jr.              YOB: 1965      Neo Aguilar Jr. has been examined by me on 11/02/2018 , and appears to be physically well for:       ___ General Anesthesia and surgery.      ___ Sports Participation     ___ School / Work/      ___ Camp        Sincerely,    Jovanny Arredondo Jr, MD

## 2018-11-02 NOTE — PROGRESS NOTES
Subjective:       Patient ID: Neo Aguilar Jr. is a 53 y.o. male.    Chief Complaint: Hypertension (6 week checkup)    Here for follow on blood pressure.  He has been taking the lisinopril at night because he was worried about dizziness.  He got a home monitor and has been checking it at home; mostly 110s-120s/70s-80s.  He brought his machine to the office today and it actually is reading higher than our manual measurement.      Review of Systems   Constitutional: Negative for chills, fatigue, fever and unexpected weight change.   HENT: Negative for congestion, hearing loss, postnasal drip, rhinorrhea, trouble swallowing and voice change.    Eyes: Negative for photophobia and visual disturbance.   Respiratory: Negative for apnea, cough, choking, chest tightness, shortness of breath and wheezing.    Cardiovascular: Negative for chest pain, palpitations and leg swelling.   Gastrointestinal: Negative for abdominal pain, blood in stool, constipation, diarrhea, nausea, rectal pain and vomiting.   Endocrine: Negative for cold intolerance, heat intolerance, polydipsia and polyuria.   Genitourinary: Negative for decreased urine volume, difficulty urinating, discharge, dysuria, flank pain, frequency, genital sores, hematuria, testicular pain and urgency.   Musculoskeletal: Negative for arthralgias, back pain, gait problem, joint swelling, myalgias and neck pain.   Skin: Negative for color change, rash and wound.   Allergic/Immunologic: Negative for environmental allergies and food allergies.   Neurological: Negative for dizziness, tremors, seizures, syncope, facial asymmetry, speech difficulty, weakness, light-headedness, numbness and headaches.   Hematological: Negative for adenopathy. Does not bruise/bleed easily.   Psychiatric/Behavioral: Negative for confusion, hallucinations, sleep disturbance and suicidal ideas. The patient is not nervous/anxious.        Past Medical History:   Diagnosis Date    Abnormal liver  enzymes     Allergic rhinitis     Hyperlipidemia     Hypertension     Prostatitis       Past Surgical History:   Procedure Laterality Date    COLONOSCOPY  02/27/2018       Family History   Problem Relation Age of Onset    Coronary artery disease Father         >55    Cystic fibrosis Son        Social History     Socioeconomic History    Marital status:      Spouse name: None    Number of children: None    Years of education: None    Highest education level: None   Social Needs    Financial resource strain: None    Food insecurity - worry: None    Food insecurity - inability: None    Transportation needs - medical: None    Transportation needs - non-medical: None   Occupational History    Occupation: offshore    Tobacco Use    Smoking status: Former Smoker    Smokeless tobacco: Never Used   Substance and Sexual Activity    Alcohol use: Yes     Comment: occasional    Drug use: No    Sexual activity: Yes     Partners: Female     Comment:    Other Topics Concern    None   Social History Narrative    Live with wife       Current Outpatient Medications   Medication Sig Dispense Refill    aspirin (ECOTRIN) 81 MG EC tablet Take 1 tablet (81 mg total) by mouth once daily.  0    fexofenadine (ALLEGRA) 180 MG tablet Take 180 mg by mouth once daily.      fluticasone (FLONASE) 50 mcg/actuation nasal spray INHALE 1 SPRAY(S) EVERY DAY BY INTRANASAL ROUTE.  1    lisinopril (PRINIVIL,ZESTRIL) 20 MG tablet Take 1 tablet (20 mg total) by mouth once daily. 90 tablet 1    montelukast (SINGULAIR) 10 mg tablet Take 1 tablet (10 mg total) by mouth every evening. 90 tablet 1    multivitamin (ONE DAILY MULTIVITAMIN) per tablet Take 1 tablet by mouth once daily.      omega-3 acid ethyl esters (LOVAZA) 1 gram capsule Take 2 capsules (2 g total) by mouth 2 (two) times daily. 120 capsule 6    simvastatin (ZOCOR) 40 MG tablet Take 1 tablet (40 mg total) by mouth every evening. 90 tablet 1  "    No current facility-administered medications for this visit.        Review of patient's allergies indicates:  No Known Allergies  Objective:    HPI     Hypertension      Additional comments: 6 week checkup          Last edited by Kyle Burkett MA on 11/2/2018  8:46 AM. (History)      Blood pressure (!) 138/92, pulse 65, temperature 97.7 °F (36.5 °C), temperature source Temporal, height 5' 5" (1.651 m), weight 85.3 kg (188 lb), SpO2 98 %. Body mass index is 31.28 kg/m².   Physical Exam   Constitutional: He appears well-developed.   Cardiovascular: Normal rate, regular rhythm and normal heart sounds.   Pulmonary/Chest: Effort normal and breath sounds normal.   Nursing note and vitals reviewed.          Assessment:       1. Essential hypertension        Plan:       Neo was seen today for hypertension.    Diagnoses and all orders for this visit:    Essential hypertension  Comments:  Continue current meds and home monitoring.  May have a degree of white coat HTN.  Continue weight loss           "

## 2018-12-03 DIAGNOSIS — E78.2 MIXED HYPERLIPIDEMIA: ICD-10-CM

## 2018-12-03 RX ORDER — OMEGA-3-ACID ETHYL ESTERS 1 G/1
2 CAPSULE, LIQUID FILLED ORAL 2 TIMES DAILY
Qty: 120 CAPSULE | Refills: 6 | Status: SHIPPED | OUTPATIENT
Start: 2018-12-03 | End: 2018-12-10 | Stop reason: SDUPTHER

## 2018-12-10 ENCOUNTER — OFFICE VISIT (OUTPATIENT)
Dept: FAMILY MEDICINE | Facility: CLINIC | Age: 53
End: 2018-12-10
Payer: COMMERCIAL

## 2018-12-10 VITALS
SYSTOLIC BLOOD PRESSURE: 124 MMHG | DIASTOLIC BLOOD PRESSURE: 84 MMHG | WEIGHT: 191 LBS | HEART RATE: 67 BPM | BODY MASS INDEX: 31.82 KG/M2 | TEMPERATURE: 99 F | HEIGHT: 65 IN | OXYGEN SATURATION: 98 %

## 2018-12-10 DIAGNOSIS — I10 ESSENTIAL HYPERTENSION: Primary | ICD-10-CM

## 2018-12-10 DIAGNOSIS — I10 ESSENTIAL HYPERTENSION: ICD-10-CM

## 2018-12-10 DIAGNOSIS — E78.2 MIXED HYPERLIPIDEMIA: ICD-10-CM

## 2018-12-10 PROCEDURE — 3079F DIAST BP 80-89 MM HG: CPT | Mod: ,,, | Performed by: INTERNAL MEDICINE

## 2018-12-10 PROCEDURE — 3008F BODY MASS INDEX DOCD: CPT | Mod: ,,, | Performed by: INTERNAL MEDICINE

## 2018-12-10 PROCEDURE — 99212 OFFICE O/P EST SF 10 MIN: CPT | Mod: ,,, | Performed by: INTERNAL MEDICINE

## 2018-12-10 PROCEDURE — 3074F SYST BP LT 130 MM HG: CPT | Mod: ,,, | Performed by: INTERNAL MEDICINE

## 2018-12-10 RX ORDER — LISINOPRIL 20 MG/1
20 TABLET ORAL DAILY
Qty: 90 TABLET | Refills: 1 | Status: SHIPPED | OUTPATIENT
Start: 2018-12-10 | End: 2019-02-25 | Stop reason: SDUPTHER

## 2018-12-10 RX ORDER — OMEGA-3-ACID ETHYL ESTERS 1 G/1
2 CAPSULE, LIQUID FILLED ORAL 2 TIMES DAILY
Qty: 360 CAPSULE | Refills: 1 | Status: SHIPPED | OUTPATIENT
Start: 2018-12-10 | End: 2019-05-28 | Stop reason: SDUPTHER

## 2018-12-10 RX ORDER — SIMVASTATIN 40 MG/1
40 TABLET, FILM COATED ORAL NIGHTLY
Qty: 90 TABLET | Refills: 1 | Status: SHIPPED | OUTPATIENT
Start: 2018-12-10 | End: 2019-05-28 | Stop reason: SDUPTHER

## 2018-12-10 NOTE — PROGRESS NOTES
Subjective:       Patient ID: Neo Aguilar Jr. is a 53 y.o. male.    Chief Complaint: Hypertension (continuity of care and med refill for 90 day supplies)    Here for follow up on blood pressure.  Still monitoring at home; mostly 120s/80s.  Sees elevated DBP in the low 90s more often than elevated SBP.  He reports tea prior to last visit.        Review of Systems   Constitutional: Negative for chills, fatigue, fever and unexpected weight change.   HENT: Negative for congestion, hearing loss, postnasal drip, rhinorrhea, trouble swallowing and voice change.    Eyes: Negative for photophobia and visual disturbance.   Respiratory: Negative for apnea, cough, choking, chest tightness, shortness of breath and wheezing.    Cardiovascular: Negative for chest pain, palpitations and leg swelling.   Gastrointestinal: Negative for abdominal pain, blood in stool, constipation, diarrhea, nausea, rectal pain and vomiting.   Endocrine: Negative for cold intolerance, heat intolerance, polydipsia and polyuria.   Genitourinary: Negative for decreased urine volume, difficulty urinating, discharge, dysuria, flank pain, frequency, genital sores, hematuria, testicular pain and urgency.   Musculoskeletal: Negative for arthralgias, back pain, gait problem, joint swelling, myalgias and neck pain.   Skin: Negative for color change, rash and wound.   Allergic/Immunologic: Negative for environmental allergies and food allergies.   Neurological: Negative for dizziness, tremors, seizures, syncope, facial asymmetry, speech difficulty, weakness, light-headedness, numbness and headaches.   Hematological: Negative for adenopathy. Does not bruise/bleed easily.   Psychiatric/Behavioral: Negative for confusion, hallucinations, sleep disturbance and suicidal ideas. The patient is not nervous/anxious.        Past Medical History:   Diagnosis Date    Abnormal liver enzymes     Allergic rhinitis     Hyperlipidemia     Hypertension      Prostatitis       Past Surgical History:   Procedure Laterality Date    COLONOSCOPY  02/27/2018       Family History   Problem Relation Age of Onset    Coronary artery disease Father         >55    Cystic fibrosis Son        Social History     Socioeconomic History    Marital status:      Spouse name: None    Number of children: None    Years of education: None    Highest education level: None   Social Needs    Financial resource strain: None    Food insecurity - worry: None    Food insecurity - inability: None    Transportation needs - medical: None    Transportation needs - non-medical: None   Occupational History    Occupation: offshore    Tobacco Use    Smoking status: Former Smoker    Smokeless tobacco: Never Used   Substance and Sexual Activity    Alcohol use: Yes     Comment: occasional    Drug use: No    Sexual activity: Yes     Partners: Female     Comment:    Other Topics Concern    None   Social History Narrative    Live with wife       Current Outpatient Medications   Medication Sig Dispense Refill    aspirin (ECOTRIN) 81 MG EC tablet Take 1 tablet (81 mg total) by mouth once daily.  0    fexofenadine (ALLEGRA) 180 MG tablet Take 180 mg by mouth once daily.      fluticasone (FLONASE) 50 mcg/actuation nasal spray INHALE 1 SPRAY(S) EVERY DAY BY INTRANASAL ROUTE.  1    lisinopril (PRINIVIL,ZESTRIL) 20 MG tablet Take 1 tablet (20 mg total) by mouth once daily. 90 tablet 1    montelukast (SINGULAIR) 10 mg tablet Take 1 tablet (10 mg total) by mouth every evening. 90 tablet 1    multivitamin (ONE DAILY MULTIVITAMIN) per tablet Take 1 tablet by mouth once daily.      omega-3 acid ethyl esters (LOVAZA) 1 gram capsule Take 2 capsules (2 g total) by mouth 2 (two) times daily. 360 capsule 1    simvastatin (ZOCOR) 40 MG tablet Take 1 tablet (40 mg total) by mouth every evening. 90 tablet 1     No current facility-administered medications for this visit.   "      Review of patient's allergies indicates:  No Known Allergies  Objective:      Blood pressure 124/84, pulse 67, temperature 98.6 °F (37 °C), temperature source Temporal, height 5' 5" (1.651 m), weight 86.6 kg (191 lb), SpO2 98 %. Body mass index is 31.78 kg/m².   Physical Exam   Constitutional: He appears well-developed.   Cardiovascular: Normal rate, regular rhythm and normal heart sounds.   Pulmonary/Chest: Effort normal and breath sounds normal.   Nursing note and vitals reviewed.          Assessment:       1. Essential hypertension    2. Mixed hyperlipidemia    3. Essential hypertension        Plan:       Neo was seen today for hypertension.    Diagnoses and all orders for this visit:    Essential hypertension  -     lisinopril (PRINIVIL,ZESTRIL) 20 MG tablet; Take 1 tablet (20 mg total) by mouth once daily.    Mixed hyperlipidemia  -     omega-3 acid ethyl esters (LOVAZA) 1 gram capsule; Take 2 capsules (2 g total) by mouth 2 (two) times daily.  -     simvastatin (ZOCOR) 40 MG tablet; Take 1 tablet (40 mg total) by mouth every evening.    Essential hypertension  Comments:  Hold off on higher intensity exercise until BP better controlled.   Orders:  -     lisinopril (PRINIVIL,ZESTRIL) 20 MG tablet; Take 1 tablet (20 mg total) by mouth once daily.         "

## 2019-02-25 ENCOUNTER — OFFICE VISIT (OUTPATIENT)
Dept: FAMILY MEDICINE | Facility: CLINIC | Age: 54
End: 2019-02-25
Payer: COMMERCIAL

## 2019-02-25 VITALS
HEART RATE: 62 BPM | HEIGHT: 65 IN | WEIGHT: 194 LBS | DIASTOLIC BLOOD PRESSURE: 92 MMHG | BODY MASS INDEX: 32.32 KG/M2 | OXYGEN SATURATION: 98 % | SYSTOLIC BLOOD PRESSURE: 130 MMHG | TEMPERATURE: 98 F

## 2019-02-25 DIAGNOSIS — E78.2 MIXED HYPERLIPIDEMIA: ICD-10-CM

## 2019-02-25 DIAGNOSIS — I10 ESSENTIAL HYPERTENSION: Primary | ICD-10-CM

## 2019-02-25 PROCEDURE — 3075F SYST BP GE 130 - 139MM HG: CPT | Mod: ,,, | Performed by: INTERNAL MEDICINE

## 2019-02-25 PROCEDURE — 3008F PR BODY MASS INDEX (BMI) DOCUMENTED: ICD-10-PCS | Mod: ,,, | Performed by: INTERNAL MEDICINE

## 2019-02-25 PROCEDURE — 3080F PR MOST RECENT DIASTOLIC BLOOD PRESSURE >= 90 MM HG: ICD-10-PCS | Mod: ,,, | Performed by: INTERNAL MEDICINE

## 2019-02-25 PROCEDURE — 99213 PR OFFICE/OUTPT VISIT, EST, LEVL III, 20-29 MIN: ICD-10-PCS | Mod: ,,, | Performed by: INTERNAL MEDICINE

## 2019-02-25 PROCEDURE — 99213 OFFICE O/P EST LOW 20 MIN: CPT | Mod: ,,, | Performed by: INTERNAL MEDICINE

## 2019-02-25 PROCEDURE — 3008F BODY MASS INDEX DOCD: CPT | Mod: ,,, | Performed by: INTERNAL MEDICINE

## 2019-02-25 PROCEDURE — 3080F DIAST BP >= 90 MM HG: CPT | Mod: ,,, | Performed by: INTERNAL MEDICINE

## 2019-02-25 PROCEDURE — 3075F PR MOST RECENT SYSTOLIC BLOOD PRESS GE 130-139MM HG: ICD-10-PCS | Mod: ,,, | Performed by: INTERNAL MEDICINE

## 2019-02-25 RX ORDER — LISINOPRIL 20 MG/1
20 TABLET ORAL 2 TIMES DAILY
Qty: 180 TABLET | Refills: 1 | Status: SHIPPED | OUTPATIENT
Start: 2019-02-25 | End: 2019-05-28 | Stop reason: SDUPTHER

## 2019-02-25 NOTE — PROGRESS NOTES
Subjective:       Patient ID: Neo Aguilar Jr. is a 53 y.o. male.    Chief Complaint: Hypertension (continuity of care ) and Hyperlipidemia    Hypertension   This is a chronic problem. Condition status: monitoring at home;  systolics tend to be okay but DBP often in the 90s. Pertinent negatives include no chest pain, headaches, neck pain, palpitations, peripheral edema or shortness of breath. Past treatments include ACE inhibitors. There are no compliance problems.      Review of Systems   Constitutional: Negative for chills, fatigue, fever and unexpected weight change.   HENT: Negative for congestion, hearing loss, postnasal drip, rhinorrhea, trouble swallowing and voice change.    Eyes: Negative for photophobia and visual disturbance.   Respiratory: Negative for apnea, cough, choking, chest tightness, shortness of breath and wheezing.    Cardiovascular: Negative for chest pain, palpitations and leg swelling.   Gastrointestinal: Negative for abdominal pain, blood in stool, constipation, diarrhea, nausea, rectal pain and vomiting.   Endocrine: Negative for cold intolerance, heat intolerance, polydipsia and polyuria.   Genitourinary: Negative for decreased urine volume, difficulty urinating, discharge, dysuria, flank pain, frequency, genital sores, hematuria, testicular pain and urgency.   Musculoskeletal: Negative for arthralgias, back pain, gait problem, joint swelling, myalgias and neck pain.   Skin: Negative for color change, rash and wound.   Allergic/Immunologic: Negative for environmental allergies and food allergies.   Neurological: Negative for dizziness, tremors, seizures, syncope, facial asymmetry, speech difficulty, weakness, light-headedness, numbness and headaches.   Hematological: Negative for adenopathy. Does not bruise/bleed easily.   Psychiatric/Behavioral: Negative for confusion, hallucinations, sleep disturbance and suicidal ideas. The patient is not nervous/anxious.        Past Medical  History:   Diagnosis Date    Abnormal liver enzymes     Allergic rhinitis     Hyperlipidemia     Hypertension     Prostatitis       Past Surgical History:   Procedure Laterality Date    COLONOSCOPY  02/27/2018       Family History   Problem Relation Age of Onset    Coronary artery disease Father         >55    Cystic fibrosis Son        Social History     Socioeconomic History    Marital status:      Spouse name: None    Number of children: None    Years of education: None    Highest education level: None   Social Needs    Financial resource strain: None    Food insecurity - worry: None    Food insecurity - inability: None    Transportation needs - medical: None    Transportation needs - non-medical: None   Occupational History    Occupation: offshore    Tobacco Use    Smoking status: Former Smoker    Smokeless tobacco: Never Used   Substance and Sexual Activity    Alcohol use: Yes     Comment: occasional    Drug use: No    Sexual activity: Yes     Partners: Female     Comment:    Other Topics Concern    None   Social History Narrative    Live with wife       Current Outpatient Medications   Medication Sig Dispense Refill    aspirin (ECOTRIN) 81 MG EC tablet Take 1 tablet (81 mg total) by mouth once daily.  0    fexofenadine (ALLEGRA) 180 MG tablet Take 180 mg by mouth once daily.      fluticasone (FLONASE) 50 mcg/actuation nasal spray INHALE 1 SPRAY(S) EVERY DAY BY INTRANASAL ROUTE.  1    lisinopril (PRINIVIL,ZESTRIL) 20 MG tablet Take 1 tablet (20 mg total) by mouth 2 (two) times daily. 180 tablet 1    montelukast (SINGULAIR) 10 mg tablet Take 1 tablet (10 mg total) by mouth every evening. 90 tablet 1    multivitamin (ONE DAILY MULTIVITAMIN) per tablet Take 1 tablet by mouth once daily.      omega-3 acid ethyl esters (LOVAZA) 1 gram capsule Take 2 capsules (2 g total) by mouth 2 (two) times daily. 360 capsule 1    simvastatin (ZOCOR) 40 MG tablet Take 1  "tablet (40 mg total) by mouth every evening. 90 tablet 1     No current facility-administered medications for this visit.        Review of patient's allergies indicates:  No Known Allergies  Objective:    HPI     Hypertension      Additional comments: continuity of care           Last edited by Kyle Burkett MA on 2/25/2019  8:30 AM. (History)      Blood pressure (!) 130/92, pulse 62, temperature 98.2 °F (36.8 °C), temperature source Rectal, height 5' 5" (1.651 m), weight 88 kg (194 lb), SpO2 98 %. Body mass index is 32.28 kg/m².   Physical Exam   Constitutional: He appears well-developed. No distress.   Obese     HENT:   Nose: Nose normal.   Mouth/Throat: Oropharynx is clear and moist.   Eyes: Conjunctivae are normal. Right eye exhibits no discharge. Left eye exhibits no discharge. No scleral icterus.   Neck: Carotid bruit is not present.   Cardiovascular: Normal rate, regular rhythm and normal heart sounds.   No murmur heard.  Pulmonary/Chest: Effort normal and breath sounds normal. No respiratory distress. He has no decreased breath sounds. He has no wheezes. He has no rhonchi. He has no rales.   Abdominal: Soft. He exhibits no distension. There is no tenderness. There is no rebound and no guarding.   Musculoskeletal: He exhibits no edema.   Neurological: He is alert. He displays no tremor.   Skin: Skin is warm and dry.   Psychiatric: He has a normal mood and affect. His speech is normal.   Nursing note and vitals reviewed.          Assessment:       1. Essential hypertension    2. Mixed hyperlipidemia        Plan:       Neo was seen today for hypertension and hyperlipidemia.    Diagnoses and all orders for this visit:    Essential hypertension  -     lisinopril (PRINIVIL,ZESTRIL) 20 MG tablet; Take 1 tablet (20 mg total) by mouth 2 (two) times daily.  -     Urinalysis; Future  -     Lipid panel; Future  -     Comprehensive metabolic panel; Future  -     Urinalysis  -     Lipid panel  -     Comprehensive " metabolic panel    Mixed hyperlipidemia  -     Lipid panel; Future  -     Lipid panel

## 2019-02-26 LAB
ALBUMIN SERPL-MCNC: 4.5 G/DL (ref 3.5–5.5)
ALBUMIN/GLOB SERPL: 2.3 {RATIO} (ref 1.2–2.2)
ALP SERPL-CCNC: 59 IU/L (ref 39–117)
ALT SERPL-CCNC: 43 IU/L (ref 0–44)
APPEARANCE UR: CLEAR
AST SERPL-CCNC: 40 IU/L (ref 0–40)
BILIRUB SERPL-MCNC: 0.4 MG/DL (ref 0–1.2)
BILIRUB UR QL STRIP: NEGATIVE
BUN SERPL-MCNC: 14 MG/DL (ref 6–24)
BUN/CREAT SERPL: 13 (ref 9–20)
CALCIUM SERPL-MCNC: 9.4 MG/DL (ref 8.7–10.2)
CHLORIDE SERPL-SCNC: 105 MMOL/L (ref 96–106)
CHOLEST SERPL-MCNC: 132 MG/DL (ref 100–199)
CO2 SERPL-SCNC: 25 MMOL/L (ref 20–29)
COLOR UR: YELLOW
CREAT SERPL-MCNC: 1.11 MG/DL (ref 0.76–1.27)
GLOBULIN SER CALC-MCNC: 2 G/DL (ref 1.5–4.5)
GLUCOSE SERPL-MCNC: 96 MG/DL (ref 65–99)
GLUCOSE UR QL: NEGATIVE
HDLC SERPL-MCNC: 51 MG/DL
HGB UR QL STRIP: NEGATIVE
KETONES UR QL STRIP: NEGATIVE
LDLC SERPL CALC-MCNC: 63 MG/DL (ref 0–99)
LEUKOCYTE ESTERASE UR QL STRIP: NEGATIVE
MICRO URNS: NORMAL
NITRITE UR QL STRIP: NEGATIVE
PH UR STRIP: 6 [PH] (ref 5–7.5)
POTASSIUM SERPL-SCNC: 5 MMOL/L (ref 3.5–5.2)
PROT SERPL-MCNC: 6.5 G/DL (ref 6–8.5)
PROT UR QL STRIP: NEGATIVE
SODIUM SERPL-SCNC: 144 MMOL/L (ref 134–144)
SP GR UR: 1.02 (ref 1–1.03)
TRIGL SERPL-MCNC: 89 MG/DL (ref 0–149)
UROBILINOGEN UR STRIP-MCNC: 0.2 MG/DL (ref 0.2–1)
VLDLC SERPL CALC-MCNC: 18 MG/DL (ref 5–40)

## 2019-02-27 RX ORDER — MONTELUKAST SODIUM 10 MG/1
10 TABLET ORAL NIGHTLY
Qty: 90 TABLET | Refills: 1 | Status: SHIPPED | OUTPATIENT
Start: 2019-02-27 | End: 2019-05-28 | Stop reason: SDUPTHER

## 2019-03-01 ENCOUNTER — PATIENT MESSAGE (OUTPATIENT)
Dept: FAMILY MEDICINE | Facility: CLINIC | Age: 54
End: 2019-03-01

## 2019-03-04 RX ORDER — FLUTICASONE PROPIONATE 50 MCG
SPRAY, SUSPENSION (ML) NASAL
Qty: 3 BOTTLE | Refills: 1 | Status: SHIPPED | OUTPATIENT
Start: 2019-03-04 | End: 2019-05-28 | Stop reason: SDUPTHER

## 2019-05-28 ENCOUNTER — OFFICE VISIT (OUTPATIENT)
Dept: FAMILY MEDICINE | Facility: CLINIC | Age: 54
End: 2019-05-28
Payer: COMMERCIAL

## 2019-05-28 VITALS
BODY MASS INDEX: 32.82 KG/M2 | HEIGHT: 65 IN | WEIGHT: 197 LBS | DIASTOLIC BLOOD PRESSURE: 80 MMHG | SYSTOLIC BLOOD PRESSURE: 128 MMHG | HEART RATE: 64 BPM | TEMPERATURE: 98 F | OXYGEN SATURATION: 99 %

## 2019-05-28 DIAGNOSIS — E78.2 MIXED HYPERLIPIDEMIA: ICD-10-CM

## 2019-05-28 DIAGNOSIS — I10 ESSENTIAL HYPERTENSION: Primary | ICD-10-CM

## 2019-05-28 DIAGNOSIS — J30.9 ALLERGIC RHINITIS, UNSPECIFIED SEASONALITY, UNSPECIFIED TRIGGER: ICD-10-CM

## 2019-05-28 PROCEDURE — 3074F SYST BP LT 130 MM HG: CPT | Mod: ,,, | Performed by: INTERNAL MEDICINE

## 2019-05-28 PROCEDURE — 99213 OFFICE O/P EST LOW 20 MIN: CPT | Mod: ,,, | Performed by: INTERNAL MEDICINE

## 2019-05-28 PROCEDURE — 3079F PR MOST RECENT DIASTOLIC BLOOD PRESSURE 80-89 MM HG: ICD-10-PCS | Mod: ,,, | Performed by: INTERNAL MEDICINE

## 2019-05-28 PROCEDURE — 3079F DIAST BP 80-89 MM HG: CPT | Mod: ,,, | Performed by: INTERNAL MEDICINE

## 2019-05-28 PROCEDURE — 3008F PR BODY MASS INDEX (BMI) DOCUMENTED: ICD-10-PCS | Mod: ,,, | Performed by: INTERNAL MEDICINE

## 2019-05-28 PROCEDURE — 3008F BODY MASS INDEX DOCD: CPT | Mod: ,,, | Performed by: INTERNAL MEDICINE

## 2019-05-28 PROCEDURE — 99213 PR OFFICE/OUTPT VISIT, EST, LEVL III, 20-29 MIN: ICD-10-PCS | Mod: ,,, | Performed by: INTERNAL MEDICINE

## 2019-05-28 PROCEDURE — 3074F PR MOST RECENT SYSTOLIC BLOOD PRESSURE < 130 MM HG: ICD-10-PCS | Mod: ,,, | Performed by: INTERNAL MEDICINE

## 2019-05-28 RX ORDER — OMEGA-3-ACID ETHYL ESTERS 1 G/1
2 CAPSULE, LIQUID FILLED ORAL 2 TIMES DAILY
Qty: 360 CAPSULE | Refills: 1 | Status: SHIPPED | OUTPATIENT
Start: 2019-05-28 | End: 2020-01-02 | Stop reason: SDUPTHER

## 2019-05-28 RX ORDER — FLUTICASONE PROPIONATE 50 MCG
SPRAY, SUSPENSION (ML) NASAL
Qty: 3 BOTTLE | Refills: 1 | Status: SHIPPED | OUTPATIENT
Start: 2019-05-28 | End: 2019-09-03 | Stop reason: SDUPTHER

## 2019-05-28 RX ORDER — SIMVASTATIN 40 MG/1
40 TABLET, FILM COATED ORAL NIGHTLY
Qty: 90 TABLET | Refills: 1 | Status: SHIPPED | OUTPATIENT
Start: 2019-05-28 | End: 2019-12-09 | Stop reason: SDUPTHER

## 2019-05-28 RX ORDER — MONTELUKAST SODIUM 10 MG/1
10 TABLET ORAL NIGHTLY
Qty: 90 TABLET | Refills: 1 | Status: SHIPPED | OUTPATIENT
Start: 2019-05-28 | End: 2020-02-03 | Stop reason: SDUPTHER

## 2019-05-28 RX ORDER — LISINOPRIL 20 MG/1
20 TABLET ORAL 2 TIMES DAILY
Qty: 180 TABLET | Refills: 1 | Status: SHIPPED | OUTPATIENT
Start: 2019-05-28 | End: 2020-02-03 | Stop reason: SDUPTHER

## 2019-05-28 NOTE — PROGRESS NOTES
Subjective:       Patient ID: Neo Aguilar Jr. is a 54 y.o. male.    Chief Complaint: Hypertension (continuity of care)    Hypertension   This is a chronic problem. Condition status: monitoring at home;  has been doing better since being more vigilant about salt intake;  does get occ 140s/90s but mostly 120s/80s. Pertinent negatives include no chest pain, headaches, neck pain, palpitations, peripheral edema or shortness of breath. Past treatments include ACE inhibitors. There are no compliance problems (has been watching salt better and he sees a difference in BP if he eats more salt than usual).      Review of Systems   Constitutional: Negative for chills, fatigue, fever and unexpected weight change.   HENT: Negative for congestion, hearing loss, postnasal drip, rhinorrhea, trouble swallowing and voice change.    Eyes: Negative for photophobia and visual disturbance.   Respiratory: Negative for apnea, cough, choking, chest tightness, shortness of breath and wheezing.    Cardiovascular: Negative for chest pain, palpitations and leg swelling.   Gastrointestinal: Negative for abdominal pain, blood in stool, constipation, diarrhea, nausea, rectal pain and vomiting.   Endocrine: Negative for cold intolerance, heat intolerance, polydipsia and polyuria.   Genitourinary: Negative for decreased urine volume, difficulty urinating, discharge, dysuria, flank pain, frequency, genital sores, hematuria, testicular pain and urgency.   Musculoskeletal: Negative for arthralgias, back pain, gait problem, joint swelling, myalgias and neck pain.   Skin: Negative for color change, rash and wound.   Allergic/Immunologic: Negative for environmental allergies and food allergies.   Neurological: Negative for dizziness, tremors, seizures, syncope, facial asymmetry, speech difficulty, weakness, light-headedness, numbness and headaches.   Hematological: Negative for adenopathy. Does not bruise/bleed easily.   Psychiatric/Behavioral:  Negative for confusion, hallucinations, sleep disturbance and suicidal ideas. The patient is not nervous/anxious.        Past Medical History:   Diagnosis Date    Abnormal liver enzymes     Allergic rhinitis     Hyperlipidemia     Hypertension     Prostatitis       Past Surgical History:   Procedure Laterality Date    COLONOSCOPY  02/27/2018       Family History   Problem Relation Age of Onset    Coronary artery disease Father         >55    Cystic fibrosis Son        Social History     Socioeconomic History    Marital status:      Spouse name: Not on file    Number of children: Not on file    Years of education: Not on file    Highest education level: Not on file   Occupational History    Occupation: offshore    Social Needs    Financial resource strain: Not on file    Food insecurity:     Worry: Not on file     Inability: Not on file    Transportation needs:     Medical: Not on file     Non-medical: Not on file   Tobacco Use    Smoking status: Former Smoker    Smokeless tobacco: Never Used   Substance and Sexual Activity    Alcohol use: Yes     Comment: occasional    Drug use: No    Sexual activity: Yes     Partners: Female     Comment:    Lifestyle    Physical activity:     Days per week: Not on file     Minutes per session: Not on file    Stress: Not at all   Relationships    Social connections:     Talks on phone: Not on file     Gets together: Not on file     Attends Sikhism service: Not on file     Active member of club or organization: Not on file     Attends meetings of clubs or organizations: Not on file     Relationship status: Not on file   Other Topics Concern    Not on file   Social History Narrative    Live with wife       Current Outpatient Medications   Medication Sig Dispense Refill    aspirin (ECOTRIN) 81 MG EC tablet Take 1 tablet (81 mg total) by mouth once daily.  0    fexofenadine (ALLEGRA) 180 MG tablet Take 180 mg by mouth once daily.    "   fluticasone propionate (FLONASE) 50 mcg/actuation nasal spray INHALE 1 SPRAY(S) EVERY DAY BY INTRANASAL ROUTE. 3 Bottle 1    lisinopril (PRINIVIL,ZESTRIL) 20 MG tablet Take 1 tablet (20 mg total) by mouth 2 (two) times daily. 180 tablet 1    montelukast (SINGULAIR) 10 mg tablet Take 1 tablet (10 mg total) by mouth every evening. 90 tablet 1    multivitamin (ONE DAILY MULTIVITAMIN) per tablet Take 1 tablet by mouth once daily.      simvastatin (ZOCOR) 40 MG tablet Take 1 tablet (40 mg total) by mouth every evening. 90 tablet 1    omega-3 acid ethyl esters (LOVAZA) 1 gram capsule Take 2 capsules (2 g total) by mouth 2 (two) times daily. 360 capsule 1     No current facility-administered medications for this visit.        Review of patient's allergies indicates:  No Known Allergies  Objective:    HPI     Hypertension      Additional comments: continuity of care          Last edited by Kyle Burkett MA on 5/28/2019  8:05 AM. (History)      Blood pressure 128/80, pulse 64, temperature 98 °F (36.7 °C), temperature source Temporal, height 5' 5" (1.651 m), weight 89.4 kg (197 lb), SpO2 99 %. Body mass index is 32.78 kg/m².   Physical Exam   Constitutional: He appears well-developed. No distress.   Obese     HENT:   Nose: Nose normal.   Mouth/Throat: Oropharynx is clear and moist.   Eyes: Conjunctivae are normal. Right eye exhibits no discharge. Left eye exhibits no discharge. No scleral icterus.   Neck: Carotid bruit is not present.   Cardiovascular: Normal rate, regular rhythm and normal heart sounds.   No murmur heard.  Pulmonary/Chest: Effort normal and breath sounds normal. No respiratory distress. He has no decreased breath sounds. He has no wheezes. He has no rhonchi. He has no rales.   Abdominal: Soft. He exhibits no distension. There is no tenderness. There is no rebound and no guarding.   Musculoskeletal: He exhibits no edema.   Neurological: He is alert. He displays no tremor.   Skin: Skin is warm and " dry.   Psychiatric: He has a normal mood and affect. His speech is normal.   Nursing note and vitals reviewed.        No visits with results within 3 Month(s) from this visit.   Latest known visit with results is:   Office Visit on 02/25/2019   Component Date Value Ref Range Status    Specific Gravity, UA 02/25/2019 1.016  1.005 - 1.030 Final    pH, UA 02/25/2019 6.0  5.0 - 7.5 Final    Color, UA 02/25/2019 Yellow  Yellow Final    Clarity, UA 02/25/2019 Clear  Clear Final    Leukocytes, UA 02/25/2019 Negative  Negative Final    Protein, UA 02/25/2019 Negative  Negative/Trace Final    Glucose, UA 02/25/2019 Negative  Negative Final    Ketones, UA 02/25/2019 Negative  Negative Final    Occult Blood UA 02/25/2019 Negative  Negative Final    Bilirubin, UA 02/25/2019 Negative  Negative Final    Urobilinogen, UA 02/25/2019 0.2  0.2 - 1.0 mg/dL Final    Nitrite, UA 02/25/2019 Negative  Negative Final    Microscopic Examination 02/25/2019 Comment   Final    Microscopic not indicated and not performed.    Cholesterol 02/25/2019 132  100 - 199 mg/dL Final    Triglycerides 02/25/2019 89  0 - 149 mg/dL Final    HDL 02/25/2019 51  >39 mg/dL Final    VLDL Cholesterol Abdirahman 02/25/2019 18  5 - 40 mg/dL Final    LDL Calculated 02/25/2019 63  0 - 99 mg/dL Final    Glucose 02/25/2019 96  65 - 99 mg/dL Final    BUN, Bld 02/25/2019 14  6 - 24 mg/dL Final    Creatinine 02/25/2019 1.11  0.76 - 1.27 mg/dL Final    eGFR if non African American 02/25/2019 76  >59 mL/min/1.73 Final    eGFR if  02/25/2019 88  >59 mL/min/1.73 Final    BUN/Creatinine Ratio 02/25/2019 13  9 - 20 Final    Sodium 02/25/2019 144  134 - 144 mmol/L Final    Potassium 02/25/2019 5.0  3.5 - 5.2 mmol/L Final    Chloride 02/25/2019 105  96 - 106 mmol/L Final    CO2 02/25/2019 25  20 - 29 mmol/L Final    Calcium 02/25/2019 9.4  8.7 - 10.2 mg/dL Final    Total Protein 02/25/2019 6.5  6.0 - 8.5 g/dL Final    Albumin 02/25/2019 4.5   3.5 - 5.5 g/dL Final    Globulin, Total 02/25/2019 2.0  1.5 - 4.5 g/dL Final    Albumin/Globulin Ratio 02/25/2019 2.3* 1.2 - 2.2 Final    Total Bilirubin 02/25/2019 0.4  0.0 - 1.2 mg/dL Final    Alkaline Phosphatase 02/25/2019 59  39 - 117 IU/L Final    AST 02/25/2019 40  0 - 40 IU/L Final    ALT 02/25/2019 43  0 - 44 IU/L Final   ]  Assessment:       1. Essential hypertension    2. Allergic rhinitis, unspecified seasonality, unspecified trigger    3. Mixed hyperlipidemia        Plan:       Neo was seen today for hypertension.    Diagnoses and all orders for this visit:    Essential hypertension  -     lisinopril (PRINIVIL,ZESTRIL) 20 MG tablet; Take 1 tablet (20 mg total) by mouth 2 (two) times daily.    Allergic rhinitis, unspecified seasonality, unspecified trigger  -     montelukast (SINGULAIR) 10 mg tablet; Take 1 tablet (10 mg total) by mouth every evening.  -     fluticasone propionate (FLONASE) 50 mcg/actuation nasal spray; INHALE 1 SPRAY(S) EVERY DAY BY INTRANASAL ROUTE.    Mixed hyperlipidemia  -     simvastatin (ZOCOR) 40 MG tablet; Take 1 tablet (40 mg total) by mouth every evening.  -     omega-3 acid ethyl esters (LOVAZA) 1 gram capsule; Take 2 capsules (2 g total) by mouth 2 (two) times daily.

## 2019-09-03 RX ORDER — FLUTICASONE PROPIONATE 50 MCG
SPRAY, SUSPENSION (ML) NASAL
Qty: 48 ML | Refills: 1 | Status: SHIPPED | OUTPATIENT
Start: 2019-09-03 | End: 2020-07-24 | Stop reason: SDUPTHER

## 2019-09-11 ENCOUNTER — OFFICE VISIT (OUTPATIENT)
Dept: FAMILY MEDICINE | Facility: CLINIC | Age: 54
End: 2019-09-11
Payer: COMMERCIAL

## 2019-09-11 VITALS
TEMPERATURE: 98 F | WEIGHT: 205 LBS | OXYGEN SATURATION: 98 % | BODY MASS INDEX: 34.16 KG/M2 | HEART RATE: 78 BPM | DIASTOLIC BLOOD PRESSURE: 60 MMHG | HEIGHT: 65 IN | SYSTOLIC BLOOD PRESSURE: 106 MMHG

## 2019-09-11 DIAGNOSIS — E78.2 MIXED HYPERLIPIDEMIA: ICD-10-CM

## 2019-09-11 DIAGNOSIS — Z02.89 ENCOUNTER FOR PHYSICAL EXAMINATION RELATED TO EMPLOYMENT: ICD-10-CM

## 2019-09-11 DIAGNOSIS — Z00.01 ENCOUNTER FOR PREVENTATIVE ADULT HEALTH CARE EXAM WITH ABNORMAL FINDINGS: Primary | ICD-10-CM

## 2019-09-11 DIAGNOSIS — I10 ESSENTIAL HYPERTENSION: ICD-10-CM

## 2019-09-11 PROCEDURE — 3078F PR MOST RECENT DIASTOLIC BLOOD PRESSURE < 80 MM HG: ICD-10-PCS | Mod: S$GLB,,, | Performed by: INTERNAL MEDICINE

## 2019-09-11 PROCEDURE — 3008F BODY MASS INDEX DOCD: CPT | Mod: S$GLB,,, | Performed by: INTERNAL MEDICINE

## 2019-09-11 PROCEDURE — 3074F PR MOST RECENT SYSTOLIC BLOOD PRESSURE < 130 MM HG: ICD-10-PCS | Mod: S$GLB,,, | Performed by: INTERNAL MEDICINE

## 2019-09-11 PROCEDURE — 3008F PR BODY MASS INDEX (BMI) DOCUMENTED: ICD-10-PCS | Mod: S$GLB,,, | Performed by: INTERNAL MEDICINE

## 2019-09-11 PROCEDURE — 3074F SYST BP LT 130 MM HG: CPT | Mod: S$GLB,,, | Performed by: INTERNAL MEDICINE

## 2019-09-11 PROCEDURE — 3078F DIAST BP <80 MM HG: CPT | Mod: S$GLB,,, | Performed by: INTERNAL MEDICINE

## 2019-09-11 PROCEDURE — 99396 PR PREVENTIVE VISIT,EST,40-64: ICD-10-PCS | Mod: S$GLB,,, | Performed by: INTERNAL MEDICINE

## 2019-09-11 PROCEDURE — 99396 PREV VISIT EST AGE 40-64: CPT | Mod: S$GLB,,, | Performed by: INTERNAL MEDICINE

## 2019-09-11 NOTE — PROGRESS NOTES
Subjective:       Patient ID: Neo Aguilar Jr. is a 54 y.o. male.    Chief Complaint: Hypertension (continuity of care) and Employment Physical ([employer] physical  CORRECTED VISION BOTH EYES 20/20, L/R 20/20    UNCORRECTED VISION BOTH EYES 20/25, RIGHT EYE 20/25, LEFT EYE 20/70)    Here for follow up on blood pressure.  Needs [employer] physical done.    Hypertension   This is a chronic problem. Condition status: monitoring at home; tends to run higher on home monitoring; he brought his cuff which is small and does not correlate with our readings. Pertinent negatives include no chest pain, headaches, neck pain, palpitations, peripheral edema or shortness of breath. Past treatments include ACE inhibitors. There are no compliance problems (has been watching salt better and he sees a difference in BP if he eats more salt than usual).    Hyperlipidemia   This is a chronic problem. The problem is controlled. Recent lipid tests were reviewed and are normal. Pertinent negatives include no chest pain, myalgias or shortness of breath. Current antihyperlipidemic treatment includes statins (fish oil). The current treatment provides significant improvement of lipids. There are no compliance problems.  Risk factors for coronary artery disease include hypertension, male sex and obesity.     Review of Systems   Constitutional: Negative for chills, fatigue, fever and unexpected weight change.   HENT: Negative for congestion, hearing loss, postnasal drip, rhinorrhea, trouble swallowing and voice change.    Eyes: Negative for photophobia and visual disturbance.   Respiratory: Negative for apnea, cough, choking, chest tightness, shortness of breath and wheezing.    Cardiovascular: Negative for chest pain, palpitations and leg swelling.   Gastrointestinal: Negative for abdominal pain, blood in stool, constipation, diarrhea, nausea, rectal pain and vomiting.   Endocrine: Negative for cold intolerance, heat intolerance,  polydipsia and polyuria.   Genitourinary: Negative for decreased urine volume, difficulty urinating, discharge, dysuria, flank pain, frequency, genital sores, hematuria, testicular pain and urgency.   Musculoskeletal: Negative for arthralgias, back pain, gait problem, joint swelling, myalgias and neck pain.   Skin: Negative for color change, rash and wound.   Allergic/Immunologic: Negative for environmental allergies and food allergies.   Neurological: Negative for dizziness, tremors, seizures, syncope, facial asymmetry, speech difficulty, weakness, light-headedness, numbness and headaches.   Hematological: Negative for adenopathy. Does not bruise/bleed easily.   Psychiatric/Behavioral: Negative for confusion, hallucinations, sleep disturbance and suicidal ideas. The patient is not nervous/anxious.        Past Medical History:   Diagnosis Date    Abnormal liver enzymes     Allergic rhinitis     Hyperlipidemia     Hypertension     Prostatitis       Past Surgical History:   Procedure Laterality Date    COLONOSCOPY  02/27/2018       Family History   Problem Relation Age of Onset    Coronary artery disease Father         >55    Cystic fibrosis Son        Social History     Socioeconomic History    Marital status:      Spouse name: Not on file    Number of children: Not on file    Years of education: Not on file    Highest education level: Not on file   Occupational History    Occupation: offshore    Social Needs    Financial resource strain: Not on file    Food insecurity:     Worry: Not on file     Inability: Not on file    Transportation needs:     Medical: Not on file     Non-medical: Not on file   Tobacco Use    Smoking status: Former Smoker    Smokeless tobacco: Never Used   Substance and Sexual Activity    Alcohol use: Yes     Comment: occasional    Drug use: No    Sexual activity: Yes     Partners: Female     Comment:    Lifestyle    Physical activity:     Days per  week: Not on file     Minutes per session: Not on file    Stress: Not at all   Relationships    Social connections:     Talks on phone: Not on file     Gets together: Not on file     Attends Church service: Not on file     Active member of club or organization: Not on file     Attends meetings of clubs or organizations: Not on file     Relationship status: Not on file   Other Topics Concern    Not on file   Social History Narrative    Live with wife       Current Outpatient Medications   Medication Sig Dispense Refill    aspirin (ECOTRIN) 81 MG EC tablet Take 1 tablet (81 mg total) by mouth once daily.  0    fexofenadine (ALLEGRA) 180 MG tablet Take 180 mg by mouth once daily.      fluticasone propionate (FLONASE) 50 mcg/actuation nasal spray INHALE 1 SPRAY(S) IN BOTH NOSTRILS ONCE DAILY 48 mL 1    lisinopril (PRINIVIL,ZESTRIL) 20 MG tablet Take 1 tablet (20 mg total) by mouth 2 (two) times daily. 180 tablet 1    montelukast (SINGULAIR) 10 mg tablet Take 1 tablet (10 mg total) by mouth every evening. 90 tablet 1    multivitamin (ONE DAILY MULTIVITAMIN) per tablet Take 1 tablet by mouth once daily.      simvastatin (ZOCOR) 40 MG tablet Take 1 tablet (40 mg total) by mouth every evening. 90 tablet 1    omega-3 acid ethyl esters (LOVAZA) 1 gram capsule Take 2 capsules (2 g total) by mouth 2 (two) times daily. 360 capsule 1     No current facility-administered medications for this visit.        Review of patient's allergies indicates:  No Known Allergies  Objective:    HPI     Hypertension      Additional comments: continuity of care              Employment Physical      Additional comments: [employer] physical  CORRECTED VISION BOTH EYES   20/20, L/R 20/20    UNCORRECTED VISION BOTH EYES 20/25, RIGHT EYE 20/25,   LEFT EYE 20/70          Last edited by Kyle Burkett MA on 9/11/2019  9:47 AM. (History)      Blood pressure 106/60, pulse 78, temperature 98.1 °F (36.7 °C), temperature source Temporal,  "height 5' 5" (1.651 m), weight 93 kg (205 lb), SpO2 98 %. Body mass index is 34.11 kg/m².   Physical Exam   Constitutional: He appears well-developed.   HENT:   Head: Normocephalic and atraumatic.   Right Ear: Hearing, tympanic membrane, external ear and ear canal normal.   Left Ear: Hearing, tympanic membrane, external ear and ear canal normal.   Nose: Nose normal.   Mouth/Throat: Uvula is midline and oropharynx is clear and moist.   Color vision normal   Eyes: Pupils are equal, round, and reactive to light. Conjunctivae, EOM and lids are normal. Right eye exhibits no discharge. Left eye exhibits no discharge. Right conjunctiva is not injected. Right conjunctiva has no hemorrhage. Left conjunctiva is not injected. Left conjunctiva has no hemorrhage. No scleral icterus.   Neck: Carotid bruit is not present. No thyromegaly present.   Cardiovascular: Normal rate, regular rhythm and normal heart sounds. Exam reveals no gallop and no friction rub.   No murmur heard.  Pulses:       Dorsalis pedis pulses are 2+ on the right side, and 2+ on the left side.   Pulmonary/Chest: Effort normal and breath sounds normal. No respiratory distress. He has no wheezes. He has no rhonchi. He has no rales.   Abdominal: Soft. Bowel sounds are normal. He exhibits no distension, no abdominal bruit, no pulsatile midline mass and no mass. There is no hepatosplenomegaly. There is no tenderness. There is no rebound and no guarding. No hernia.   Musculoskeletal: He exhibits no edema.   Lymphadenopathy:     He has no cervical adenopathy.   Neurological: He is alert. He has normal reflexes. He displays no tremor. No cranial nerve deficit.   Skin: Skin is warm and dry.   Psychiatric: He has a normal mood and affect. His speech is normal and behavior is normal.   Nursing note and vitals reviewed.          Assessment:       1. Essential hypertension    2. Mixed hyperlipidemia    3. Encounter for physical examination related to employment        Plan: "       Neo was seen today for hypertension and employment physical.    Diagnoses and all orders for this visit:    Essential hypertension  Comments:  Well controlled.  Needs large adult cuff for home monitoring    Mixed hyperlipidemia  Comments:  Last labs normal    Encounter for physical examination related to employment  Comments:  [employer] paperwork completed

## 2019-12-09 DIAGNOSIS — E78.2 MIXED HYPERLIPIDEMIA: ICD-10-CM

## 2019-12-09 RX ORDER — SIMVASTATIN 40 MG/1
40 TABLET, FILM COATED ORAL NIGHTLY
Qty: 90 TABLET | Refills: 1 | Status: SHIPPED | OUTPATIENT
Start: 2019-12-09 | End: 2020-01-02 | Stop reason: SDUPTHER

## 2020-01-02 DIAGNOSIS — E78.2 MIXED HYPERLIPIDEMIA: ICD-10-CM

## 2020-01-02 RX ORDER — OMEGA-3-ACID ETHYL ESTERS 1 G/1
2 CAPSULE, LIQUID FILLED ORAL 2 TIMES DAILY
Qty: 360 CAPSULE | Refills: 0 | Status: SHIPPED | OUTPATIENT
Start: 2020-01-02 | End: 2020-07-24 | Stop reason: SDUPTHER

## 2020-01-02 RX ORDER — SIMVASTATIN 40 MG/1
40 TABLET, FILM COATED ORAL NIGHTLY
Qty: 90 TABLET | Refills: 0 | Status: SHIPPED | OUTPATIENT
Start: 2020-01-02 | End: 2020-02-03 | Stop reason: SDUPTHER

## 2020-01-22 ENCOUNTER — PATIENT MESSAGE (OUTPATIENT)
Dept: FAMILY MEDICINE | Facility: CLINIC | Age: 55
End: 2020-01-22

## 2020-01-22 DIAGNOSIS — Z00.00 ENCOUNTER FOR PREVENTATIVE ADULT HEALTH CARE EXAMINATION: Primary | ICD-10-CM

## 2020-01-22 DIAGNOSIS — I10 ESSENTIAL HYPERTENSION: ICD-10-CM

## 2020-01-30 LAB
ALBUMIN SERPL-MCNC: 4.8 G/DL (ref 3.8–4.9)
ALBUMIN/GLOB SERPL: 2.2 {RATIO} (ref 1.2–2.2)
ALP SERPL-CCNC: 52 IU/L (ref 39–117)
ALT SERPL-CCNC: 29 IU/L (ref 0–44)
APPEARANCE UR: CLEAR
AST SERPL-CCNC: 24 IU/L (ref 0–40)
BILIRUB SERPL-MCNC: 0.4 MG/DL (ref 0–1.2)
BILIRUB UR QL STRIP: NEGATIVE
BUN SERPL-MCNC: 19 MG/DL (ref 6–24)
BUN/CREAT SERPL: 17 (ref 9–20)
CALCIUM SERPL-MCNC: 9.4 MG/DL (ref 8.7–10.2)
CHLORIDE SERPL-SCNC: 101 MMOL/L (ref 96–106)
CHOLEST SERPL-MCNC: 112 MG/DL (ref 100–199)
CO2 SERPL-SCNC: 23 MMOL/L (ref 20–29)
COLOR UR: YELLOW
CREAT SERPL-MCNC: 1.12 MG/DL (ref 0.76–1.27)
GLOBULIN SER CALC-MCNC: 2.2 G/DL (ref 1.5–4.5)
GLUCOSE SERPL-MCNC: 104 MG/DL (ref 65–99)
GLUCOSE UR QL: NEGATIVE
HDLC SERPL-MCNC: 38 MG/DL
HGB UR QL STRIP: NEGATIVE
KETONES UR QL STRIP: NEGATIVE
LDLC SERPL CALC-MCNC: 57 MG/DL (ref 0–99)
LEUKOCYTE ESTERASE UR QL STRIP: NEGATIVE
MICRO URNS: NORMAL
NITRITE UR QL STRIP: NEGATIVE
PH UR STRIP: 6 [PH] (ref 5–7.5)
POTASSIUM SERPL-SCNC: 4.8 MMOL/L (ref 3.5–5.2)
PROT SERPL-MCNC: 7 G/DL (ref 6–8.5)
PROT UR QL STRIP: NEGATIVE
SODIUM SERPL-SCNC: 138 MMOL/L (ref 134–144)
SP GR UR: 1.02 (ref 1–1.03)
TRIGL SERPL-MCNC: 85 MG/DL (ref 0–149)
UROBILINOGEN UR STRIP-MCNC: 0.2 MG/DL (ref 0.2–1)
VLDLC SERPL CALC-MCNC: 17 MG/DL (ref 5–40)

## 2020-02-03 ENCOUNTER — OFFICE VISIT (OUTPATIENT)
Dept: FAMILY MEDICINE | Facility: CLINIC | Age: 55
End: 2020-02-03
Payer: COMMERCIAL

## 2020-02-03 VITALS
SYSTOLIC BLOOD PRESSURE: 120 MMHG | HEIGHT: 65 IN | BODY MASS INDEX: 33.32 KG/M2 | TEMPERATURE: 98 F | OXYGEN SATURATION: 95 % | WEIGHT: 200 LBS | HEART RATE: 67 BPM | DIASTOLIC BLOOD PRESSURE: 86 MMHG

## 2020-02-03 DIAGNOSIS — I10 ESSENTIAL HYPERTENSION: Primary | ICD-10-CM

## 2020-02-03 DIAGNOSIS — R73.01 IMPAIRED FASTING GLUCOSE: ICD-10-CM

## 2020-02-03 DIAGNOSIS — E78.2 MIXED HYPERLIPIDEMIA: ICD-10-CM

## 2020-02-03 DIAGNOSIS — J30.9 ALLERGIC RHINITIS, UNSPECIFIED SEASONALITY, UNSPECIFIED TRIGGER: ICD-10-CM

## 2020-02-03 LAB — HBA1C MFR BLD: 5.2 %

## 2020-02-03 PROCEDURE — 3079F PR MOST RECENT DIASTOLIC BLOOD PRESSURE 80-89 MM HG: ICD-10-PCS | Mod: S$GLB,,, | Performed by: INTERNAL MEDICINE

## 2020-02-03 PROCEDURE — 83036 POCT HEMOGLOBIN A1C: ICD-10-PCS | Mod: QW,,, | Performed by: INTERNAL MEDICINE

## 2020-02-03 PROCEDURE — 3008F PR BODY MASS INDEX (BMI) DOCUMENTED: ICD-10-PCS | Mod: S$GLB,,, | Performed by: INTERNAL MEDICINE

## 2020-02-03 PROCEDURE — 3079F DIAST BP 80-89 MM HG: CPT | Mod: S$GLB,,, | Performed by: INTERNAL MEDICINE

## 2020-02-03 PROCEDURE — 3074F PR MOST RECENT SYSTOLIC BLOOD PRESSURE < 130 MM HG: ICD-10-PCS | Mod: S$GLB,,, | Performed by: INTERNAL MEDICINE

## 2020-02-03 PROCEDURE — 83036 HEMOGLOBIN GLYCOSYLATED A1C: CPT | Mod: QW,,, | Performed by: INTERNAL MEDICINE

## 2020-02-03 PROCEDURE — 3008F BODY MASS INDEX DOCD: CPT | Mod: S$GLB,,, | Performed by: INTERNAL MEDICINE

## 2020-02-03 PROCEDURE — 3074F SYST BP LT 130 MM HG: CPT | Mod: S$GLB,,, | Performed by: INTERNAL MEDICINE

## 2020-02-03 PROCEDURE — 99214 OFFICE O/P EST MOD 30 MIN: CPT | Mod: S$GLB,,, | Performed by: INTERNAL MEDICINE

## 2020-02-03 PROCEDURE — 99214 PR OFFICE/OUTPT VISIT, EST, LEVL IV, 30-39 MIN: ICD-10-PCS | Mod: S$GLB,,, | Performed by: INTERNAL MEDICINE

## 2020-02-03 RX ORDER — LISINOPRIL 20 MG/1
20 TABLET ORAL 2 TIMES DAILY
Qty: 180 TABLET | Refills: 1 | Status: SHIPPED | OUTPATIENT
Start: 2020-02-03 | End: 2020-07-24 | Stop reason: SDUPTHER

## 2020-02-03 RX ORDER — SIMVASTATIN 40 MG/1
40 TABLET, FILM COATED ORAL NIGHTLY
Qty: 90 TABLET | Refills: 1 | Status: SHIPPED | OUTPATIENT
Start: 2020-02-03 | End: 2020-07-24 | Stop reason: SDUPTHER

## 2020-02-03 RX ORDER — MONTELUKAST SODIUM 10 MG/1
10 TABLET ORAL NIGHTLY
Qty: 90 TABLET | Refills: 1 | Status: SHIPPED | OUTPATIENT
Start: 2020-02-03 | End: 2020-07-24 | Stop reason: SDUPTHER

## 2020-02-03 NOTE — PROGRESS NOTES
Subjective:       Patient ID: Neo Aguilar Jr. is a 54 y.o. male.    Chief Complaint: Hypertension (continuity of care) and Hyperlipidemia    Hypertension   This is a chronic problem. Pertinent negatives include no chest pain, headaches, neck pain, palpitations, peripheral edema or shortness of breath. Risk factors for coronary artery disease include male gender, obesity and dyslipidemia. Past treatments include ACE inhibitors. There are no compliance problems (has been watching salt better and he sees a difference in BP if he eats more salt than usual).    Hyperlipidemia   This is a chronic problem. The problem is controlled. Recent lipid tests were reviewed and are normal. Pertinent negatives include no chest pain, myalgias or shortness of breath. Current antihyperlipidemic treatment includes statins (fish oil). The current treatment provides significant improvement of lipids. There are no compliance problems.  Risk factors for coronary artery disease include hypertension, male sex and obesity.     Review of Systems   Constitutional: Negative for chills, fatigue, fever and unexpected weight change.   HENT: Negative for congestion, hearing loss, postnasal drip, rhinorrhea, trouble swallowing and voice change.    Eyes: Negative for photophobia and visual disturbance.   Respiratory: Negative for apnea, cough, choking, chest tightness, shortness of breath and wheezing.    Cardiovascular: Negative for chest pain, palpitations and leg swelling.   Gastrointestinal: Negative for abdominal pain, blood in stool, constipation, diarrhea, nausea, rectal pain and vomiting.   Endocrine: Negative for cold intolerance, heat intolerance, polydipsia and polyuria.   Genitourinary: Negative for decreased urine volume, difficulty urinating, discharge, dysuria, flank pain, frequency, genital sores, hematuria, testicular pain and urgency.   Musculoskeletal: Negative for arthralgias, back pain, gait problem, joint swelling,  myalgias and neck pain.   Skin: Negative for color change, rash and wound.   Allergic/Immunologic: Negative for environmental allergies and food allergies.   Neurological: Negative for dizziness, tremors, seizures, syncope, facial asymmetry, speech difficulty, weakness, light-headedness, numbness and headaches.   Hematological: Negative for adenopathy. Does not bruise/bleed easily.   Psychiatric/Behavioral: Negative for confusion, hallucinations, sleep disturbance and suicidal ideas. The patient is not nervous/anxious.        Past Medical History:   Diagnosis Date    Abnormal liver enzymes     Allergic rhinitis     Hyperlipidemia     Hypertension     Prostatitis       Past Surgical History:   Procedure Laterality Date    COLONOSCOPY  02/27/2018       Family History   Problem Relation Age of Onset    Coronary artery disease Father         >55    Cystic fibrosis Son        Social History     Socioeconomic History    Marital status:      Spouse name: Not on file    Number of children: Not on file    Years of education: Not on file    Highest education level: Not on file   Occupational History    Occupation: offshore    Social Needs    Financial resource strain: Not on file    Food insecurity:     Worry: Not on file     Inability: Not on file    Transportation needs:     Medical: Not on file     Non-medical: Not on file   Tobacco Use    Smoking status: Former Smoker    Smokeless tobacco: Never Used   Substance and Sexual Activity    Alcohol use: Yes     Comment: occasional    Drug use: No    Sexual activity: Yes     Partners: Female     Comment:    Lifestyle    Physical activity:     Days per week: Not on file     Minutes per session: Not on file    Stress: Not at all   Relationships    Social connections:     Talks on phone: Not on file     Gets together: Not on file     Attends [employer] service: Not on file     Active member of club or organization: Not on file      "Attends meetings of clubs or organizations: Not on file     Relationship status: Not on file   Other Topics Concern    Not on file   Social History Narrative    Live with wife       Current Outpatient Medications   Medication Sig Dispense Refill    fexofenadine (ALLEGRA) 180 MG tablet Take 180 mg by mouth once daily.      fluticasone propionate (FLONASE) 50 mcg/actuation nasal spray INHALE 1 SPRAY(S) IN BOTH NOSTRILS ONCE DAILY 48 mL 1    lisinopril (PRINIVIL,ZESTRIL) 20 MG tablet Take 1 tablet (20 mg total) by mouth 2 (two) times daily. 180 tablet 1    montelukast (SINGULAIR) 10 mg tablet Take 1 tablet (10 mg total) by mouth every evening. 90 tablet 1    multivitamin (ONE DAILY MULTIVITAMIN) per tablet Take 1 tablet by mouth once daily.      omega-3 acid ethyl esters (LOVAZA) 1 gram capsule Take 2 capsules (2 g total) by mouth 2 (two) times daily. 360 capsule 0    simvastatin (ZOCOR) 40 MG tablet Take 1 tablet (40 mg total) by mouth every evening. 90 tablet 1    aspirin (ECOTRIN) 81 MG EC tablet Take 1 tablet (81 mg total) by mouth once daily.  0     No current facility-administered medications for this visit.        Review of patient's allergies indicates:  No Known Allergies  Objective:    HPI     Hypertension      Additional comments: continuity of care          Last edited by Kyle Burkett MA on 2/3/2020 10:23 AM. (History)      Blood pressure 120/86, pulse 67, temperature 97.7 °F (36.5 °C), temperature source Temporal, height 5' 5" (1.651 m), weight 90.7 kg (200 lb), SpO2 95 %. Body mass index is 33.28 kg/m².   Physical Exam   Constitutional: He appears well-developed. No distress.   Obese     HENT:   Nose: Nose normal.   Mouth/Throat: Oropharynx is clear and moist.   Eyes: Conjunctivae are normal. Right eye exhibits no discharge. Left eye exhibits no discharge. No scleral icterus.   Neck: Carotid bruit is not present.   Cardiovascular: Normal rate, regular rhythm and normal heart sounds.   No " murmur heard.  Pulmonary/Chest: Effort normal and breath sounds normal. No respiratory distress. He has no decreased breath sounds. He has no wheezes. He has no rhonchi. He has no rales.   Abdominal: Soft. He exhibits no distension. There is no tenderness. There is no rebound and no guarding.   Musculoskeletal: He exhibits no edema.   Neurological: He is alert. He displays no tremor.   Skin: Skin is warm and dry.   Psychiatric: He has a normal mood and affect. His speech is normal.   Nursing note and vitals reviewed.          Assessment:       1. Essential hypertension    2. Mixed hyperlipidemia    3. Allergic rhinitis, unspecified seasonality, unspecified trigger    4. Impaired fasting glucose        Plan:       Neo was seen today for hypertension and hyperlipidemia.    Diagnoses and all orders for this visit:    Essential hypertension  -     lisinopril (PRINIVIL,ZESTRIL) 20 MG tablet; Take 1 tablet (20 mg total) by mouth 2 (two) times daily.    Mixed hyperlipidemia  -     simvastatin (ZOCOR) 40 MG tablet; Take 1 tablet (40 mg total) by mouth every evening.    Allergic rhinitis, unspecified seasonality, unspecified trigger  -     montelukast (SINGULAIR) 10 mg tablet; Take 1 tablet (10 mg total) by mouth every evening.    Impaired fasting glucose  -     Hemoglobin A1C, POCT

## 2020-06-23 ENCOUNTER — PATIENT MESSAGE (OUTPATIENT)
Dept: FAMILY MEDICINE | Facility: CLINIC | Age: 55
End: 2020-06-23

## 2020-07-24 ENCOUNTER — OFFICE VISIT (OUTPATIENT)
Dept: FAMILY MEDICINE | Facility: CLINIC | Age: 55
End: 2020-07-24
Payer: COMMERCIAL

## 2020-07-24 VITALS
SYSTOLIC BLOOD PRESSURE: 120 MMHG | BODY MASS INDEX: 35.82 KG/M2 | WEIGHT: 215 LBS | HEART RATE: 66 BPM | HEIGHT: 65 IN | OXYGEN SATURATION: 97 % | TEMPERATURE: 98 F | DIASTOLIC BLOOD PRESSURE: 86 MMHG

## 2020-07-24 DIAGNOSIS — I10 ESSENTIAL HYPERTENSION: Primary | ICD-10-CM

## 2020-07-24 DIAGNOSIS — E78.2 MIXED HYPERLIPIDEMIA: ICD-10-CM

## 2020-07-24 DIAGNOSIS — J30.9 ALLERGIC RHINITIS, UNSPECIFIED SEASONALITY, UNSPECIFIED TRIGGER: ICD-10-CM

## 2020-07-24 PROCEDURE — 99213 OFFICE O/P EST LOW 20 MIN: CPT | Mod: S$GLB,,, | Performed by: INTERNAL MEDICINE

## 2020-07-24 PROCEDURE — 3008F PR BODY MASS INDEX (BMI) DOCUMENTED: ICD-10-PCS | Mod: S$GLB,,, | Performed by: INTERNAL MEDICINE

## 2020-07-24 PROCEDURE — 3008F BODY MASS INDEX DOCD: CPT | Mod: S$GLB,,, | Performed by: INTERNAL MEDICINE

## 2020-07-24 PROCEDURE — 99213 PR OFFICE/OUTPT VISIT, EST, LEVL III, 20-29 MIN: ICD-10-PCS | Mod: S$GLB,,, | Performed by: INTERNAL MEDICINE

## 2020-07-24 PROCEDURE — 3079F DIAST BP 80-89 MM HG: CPT | Mod: S$GLB,,, | Performed by: INTERNAL MEDICINE

## 2020-07-24 PROCEDURE — 3074F PR MOST RECENT SYSTOLIC BLOOD PRESSURE < 130 MM HG: ICD-10-PCS | Mod: S$GLB,,, | Performed by: INTERNAL MEDICINE

## 2020-07-24 PROCEDURE — 3074F SYST BP LT 130 MM HG: CPT | Mod: S$GLB,,, | Performed by: INTERNAL MEDICINE

## 2020-07-24 PROCEDURE — 3079F PR MOST RECENT DIASTOLIC BLOOD PRESSURE 80-89 MM HG: ICD-10-PCS | Mod: S$GLB,,, | Performed by: INTERNAL MEDICINE

## 2020-07-24 RX ORDER — FLUTICASONE PROPIONATE 50 MCG
SPRAY, SUSPENSION (ML) NASAL
Qty: 48 ML | Refills: 1 | Status: SHIPPED | OUTPATIENT
Start: 2020-07-24 | End: 2021-02-22 | Stop reason: SDUPTHER

## 2020-07-24 RX ORDER — OMEGA-3-ACID ETHYL ESTERS 1 G/1
2 CAPSULE, LIQUID FILLED ORAL 2 TIMES DAILY
Qty: 360 CAPSULE | Refills: 0 | Status: SHIPPED | OUTPATIENT
Start: 2020-07-24 | End: 2020-12-16

## 2020-07-24 RX ORDER — LISINOPRIL 20 MG/1
20 TABLET ORAL 2 TIMES DAILY
Qty: 180 TABLET | Refills: 1 | Status: SHIPPED | OUTPATIENT
Start: 2020-07-24 | End: 2021-02-22 | Stop reason: SDUPTHER

## 2020-07-24 RX ORDER — MONTELUKAST SODIUM 10 MG/1
10 TABLET ORAL NIGHTLY
Qty: 90 TABLET | Refills: 1 | Status: SHIPPED | OUTPATIENT
Start: 2020-07-24 | End: 2021-02-22 | Stop reason: SDUPTHER

## 2020-07-24 RX ORDER — MINERAL OIL
180 ENEMA (ML) RECTAL DAILY
Qty: 90 TABLET | Refills: 1 | Status: SHIPPED | OUTPATIENT
Start: 2020-07-24 | End: 2021-01-29

## 2020-07-24 RX ORDER — SIMVASTATIN 40 MG/1
40 TABLET, FILM COATED ORAL NIGHTLY
Qty: 90 TABLET | Refills: 1 | Status: SHIPPED | OUTPATIENT
Start: 2020-07-24 | End: 2021-02-22 | Stop reason: SDUPTHER

## 2020-07-24 NOTE — PROGRESS NOTES
Subjective:       Patient ID: Neo Aguilar Jr. is a 55 y.o. male.    Chief Complaint: Hypertension and Hyperlipidemia    Here for routine follow up, med refills.    Hypertension  This is a chronic problem. Pertinent negatives include no chest pain, headaches, neck pain, palpitations, peripheral edema or shortness of breath. Risk factors for coronary artery disease include male gender, obesity and dyslipidemia. Past treatments include ACE inhibitors. There are no compliance problems (has been watching salt better and he sees a difference in BP if he eats more salt than usual).    Hyperlipidemia  This is a chronic problem. The problem is controlled. Recent lipid tests were reviewed and are normal. Pertinent negatives include no chest pain, myalgias or shortness of breath. Current antihyperlipidemic treatment includes statins (fish oil). The current treatment provides significant improvement of lipids. There are no compliance problems.  Risk factors for coronary artery disease include hypertension, male sex and obesity.     Review of Systems   Constitutional: Negative for activity change, chills, fatigue, fever and unexpected weight change.   HENT: Negative for congestion, hearing loss, postnasal drip, rhinorrhea, trouble swallowing and voice change.    Eyes: Negative for photophobia, discharge and visual disturbance.   Respiratory: Negative for apnea, cough, choking, chest tightness, shortness of breath and wheezing.    Cardiovascular: Negative for chest pain, palpitations and leg swelling.   Gastrointestinal: Negative for abdominal pain, blood in stool, constipation, diarrhea, nausea, rectal pain and vomiting.   Endocrine: Negative for cold intolerance, heat intolerance, polydipsia and polyuria.   Genitourinary: Negative for decreased urine volume, difficulty urinating, discharge, dysuria, flank pain, frequency, genital sores, hematuria, testicular pain and urgency.   Musculoskeletal: Negative for  arthralgias, back pain, gait problem, joint swelling, myalgias and neck pain.   Skin: Negative for color change, rash and wound.   Allergic/Immunologic: Negative for environmental allergies and food allergies.   Neurological: Negative for dizziness, tremors, seizures, syncope, facial asymmetry, speech difficulty, weakness, light-headedness, numbness and headaches.   Hematological: Negative for adenopathy. Does not bruise/bleed easily.   Psychiatric/Behavioral: Negative for confusion, dysphoric mood, hallucinations, sleep disturbance and suicidal ideas. The patient is not nervous/anxious.        Past Medical History:   Diagnosis Date    Abnormal liver enzymes     Allergic rhinitis     Hyperlipidemia     Hypertension     Prostatitis       Past Surgical History:   Procedure Laterality Date    COLONOSCOPY  02/27/2018       Family History   Problem Relation Age of Onset    Coronary artery disease Father         >55    Cystic fibrosis Son        Social History     Socioeconomic History    Marital status:      Spouse name: Not on file    Number of children: Not on file    Years of education: Not on file    Highest education level: Not on file   Occupational History    Occupation: offshore    Social Needs    Financial resource strain: Not hard at all    Food insecurity     Worry: Never true     Inability: Never true    Transportation needs     Medical: No     Non-medical: No   Tobacco Use    Smoking status: Former Smoker    Smokeless tobacco: Never Used   Substance and Sexual Activity    Alcohol use: Yes     Frequency: 2-3 times a week     Drinks per session: 1 or 2     Binge frequency: Less than monthly     Comment: occasional    Drug use: No    Sexual activity: Yes     Partners: Female     Comment:    Lifestyle    Physical activity     Days per week: 3 days     Minutes per session: 30 min    Stress: Not at all   Relationships    Social connections     Talks on phone: More  "than three times a week     Gets together: More than three times a week     Attends Gnosticist service: Not on file     Active member of club or organization: No     Attends meetings of clubs or organizations: Never     Relationship status:    Other Topics Concern    Not on file   Social History Narrative    Live with wife       Current Outpatient Medications   Medication Sig Dispense Refill    fexofenadine (ALLEGRA) 180 MG tablet Take 1 tablet (180 mg total) by mouth once daily. 90 tablet 1    fluticasone propionate (FLONASE) 50 mcg/actuation nasal spray INHALE 1 SPRAY(S) IN BOTH NOSTRILS ONCE DAILY 48 mL 1    lisinopriL (PRINIVIL,ZESTRIL) 20 MG tablet Take 1 tablet (20 mg total) by mouth 2 (two) times daily. 180 tablet 1    montelukast (SINGULAIR) 10 mg tablet Take 1 tablet (10 mg total) by mouth every evening. 90 tablet 1    multivitamin (ONE DAILY MULTIVITAMIN) per tablet Take 1 tablet by mouth once daily.      omega-3 acid ethyl esters (LOVAZA) 1 gram capsule Take 2 capsules (2 g total) by mouth 2 (two) times daily. 360 capsule 0    simvastatin (ZOCOR) 40 MG tablet Take 1 tablet (40 mg total) by mouth every evening. 90 tablet 1    aspirin (ECOTRIN) 81 MG EC tablet Take 1 tablet (81 mg total) by mouth once daily.  0     No current facility-administered medications for this visit.        Review of patient's allergies indicates:  No Known Allergies  Objective:      Blood pressure 120/86, pulse 66, temperature 97.5 °F (36.4 °C), temperature source Temporal, height 5' 5" (1.651 m), weight 97.5 kg (215 lb), SpO2 97 %. Body mass index is 35.78 kg/m².   Physical Exam        Assessment:       1. Essential hypertension    2. Mixed hyperlipidemia    3. Allergic rhinitis, unspecified seasonality, unspecified trigger        Plan:       Neo was seen today for hypertension and hyperlipidemia.    Diagnoses and all orders for this visit:    Essential hypertension  -     lisinopriL (PRINIVIL,ZESTRIL) 20 MG " tablet; Take 1 tablet (20 mg total) by mouth 2 (two) times daily.    Mixed hyperlipidemia  -     simvastatin (ZOCOR) 40 MG tablet; Take 1 tablet (40 mg total) by mouth every evening.  -     omega-3 acid ethyl esters (LOVAZA) 1 gram capsule; Take 2 capsules (2 g total) by mouth 2 (two) times daily.    Allergic rhinitis, unspecified seasonality, unspecified trigger  -     montelukast (SINGULAIR) 10 mg tablet; Take 1 tablet (10 mg total) by mouth every evening.  -     fexofenadine (ALLEGRA) 180 MG tablet; Take 1 tablet (180 mg total) by mouth once daily.  -     fluticasone propionate (FLONASE) 50 mcg/actuation nasal spray; INHALE 1 SPRAY(S) IN BOTH NOSTRILS ONCE DAILY

## 2021-02-11 ENCOUNTER — PATIENT MESSAGE (OUTPATIENT)
Dept: FAMILY MEDICINE | Facility: CLINIC | Age: 56
End: 2021-02-11

## 2021-02-11 DIAGNOSIS — R73.01 IMPAIRED FASTING GLUCOSE: ICD-10-CM

## 2021-02-11 DIAGNOSIS — E78.2 MIXED HYPERLIPIDEMIA: ICD-10-CM

## 2021-02-11 DIAGNOSIS — Z00.00 ENCOUNTER FOR PREVENTATIVE ADULT HEALTH CARE EXAMINATION: Primary | ICD-10-CM

## 2021-02-11 DIAGNOSIS — Z12.5 PROSTATE CANCER SCREENING: ICD-10-CM

## 2021-02-11 DIAGNOSIS — I10 ESSENTIAL HYPERTENSION: ICD-10-CM

## 2021-02-18 ENCOUNTER — LAB VISIT (OUTPATIENT)
Dept: LAB | Facility: CLINIC | Age: 56
End: 2021-02-18
Payer: COMMERCIAL

## 2021-02-18 ENCOUNTER — APPOINTMENT (OUTPATIENT)
Dept: LAB | Facility: HOSPITAL | Age: 56
End: 2021-02-18
Attending: INTERNAL MEDICINE
Payer: COMMERCIAL

## 2021-02-18 DIAGNOSIS — E78.2 MIXED HYPERLIPIDEMIA: ICD-10-CM

## 2021-02-18 DIAGNOSIS — Z00.00 ENCOUNTER FOR PREVENTATIVE ADULT HEALTH CARE EXAMINATION: ICD-10-CM

## 2021-02-18 DIAGNOSIS — I10 ESSENTIAL HYPERTENSION: ICD-10-CM

## 2021-02-18 DIAGNOSIS — Z12.5 PROSTATE CANCER SCREENING: ICD-10-CM

## 2021-02-18 DIAGNOSIS — R73.01 IMPAIRED FASTING GLUCOSE: ICD-10-CM

## 2021-02-18 LAB
ALBUMIN SERPL BCP-MCNC: 4.5 G/DL (ref 3.5–5.2)
ALP SERPL-CCNC: 58 U/L (ref 55–135)
ALT SERPL W/O P-5'-P-CCNC: 48 U/L (ref 10–44)
ANION GAP SERPL CALC-SCNC: 9 MMOL/L (ref 8–16)
AST SERPL-CCNC: 30 U/L (ref 10–40)
BILIRUB SERPL-MCNC: 0.7 MG/DL (ref 0.1–1)
BILIRUB UR QL STRIP: NEGATIVE
BUN SERPL-MCNC: 18 MG/DL (ref 6–20)
CALCIUM SERPL-MCNC: 9.5 MG/DL (ref 8.7–10.5)
CHLORIDE SERPL-SCNC: 106 MMOL/L (ref 95–110)
CHOLEST SERPL-MCNC: 154 MG/DL (ref 120–199)
CHOLEST/HDLC SERPL: 4.1 {RATIO} (ref 2–5)
CLARITY UR: CLEAR
CO2 SERPL-SCNC: 27 MMOL/L (ref 23–29)
COLOR UR: YELLOW
COMPLEXED PSA SERPL-MCNC: 1.8 NG/ML (ref 0–4)
CREAT SERPL-MCNC: 1.2 MG/DL (ref 0.5–1.4)
EST. GFR  (AFRICAN AMERICAN): >60 ML/MIN/1.73 M^2
EST. GFR  (NON AFRICAN AMERICAN): >60 ML/MIN/1.73 M^2
ESTIMATED AVG GLUCOSE: 105 MG/DL (ref 68–131)
GLUCOSE SERPL-MCNC: 99 MG/DL (ref 70–110)
GLUCOSE UR QL STRIP: NEGATIVE
HBA1C MFR BLD: 5.3 % (ref 4–5.6)
HDLC SERPL-MCNC: 38 MG/DL (ref 40–75)
HDLC SERPL: 24.7 % (ref 20–50)
HGB UR QL STRIP: NEGATIVE
KETONES UR QL STRIP: NEGATIVE
LDLC SERPL CALC-MCNC: 85.6 MG/DL (ref 63–159)
LEUKOCYTE ESTERASE UR QL STRIP: NEGATIVE
NITRITE UR QL STRIP: NEGATIVE
NONHDLC SERPL-MCNC: 116 MG/DL
PH UR STRIP: 6 [PH] (ref 5–8)
POTASSIUM SERPL-SCNC: 4.7 MMOL/L (ref 3.5–5.1)
PROT SERPL-MCNC: 7.5 G/DL (ref 6–8.4)
PROT UR QL STRIP: NEGATIVE
SODIUM SERPL-SCNC: 142 MMOL/L (ref 136–145)
SP GR UR STRIP: 1.02 (ref 1–1.03)
TRIGL SERPL-MCNC: 152 MG/DL (ref 30–150)
URN SPEC COLLECT METH UR: NORMAL
UROBILINOGEN UR STRIP-ACNC: NEGATIVE EU/DL

## 2021-02-18 PROCEDURE — 80053 COMPREHEN METABOLIC PANEL: CPT

## 2021-02-18 PROCEDURE — 84153 ASSAY OF PSA TOTAL: CPT

## 2021-02-18 PROCEDURE — 83036 HEMOGLOBIN GLYCOSYLATED A1C: CPT

## 2021-02-18 PROCEDURE — 80061 LIPID PANEL: CPT

## 2021-02-18 PROCEDURE — 81003 URINALYSIS AUTO W/O SCOPE: CPT

## 2021-02-22 ENCOUNTER — OFFICE VISIT (OUTPATIENT)
Dept: FAMILY MEDICINE | Facility: CLINIC | Age: 56
End: 2021-02-22
Payer: COMMERCIAL

## 2021-02-22 VITALS
HEIGHT: 65 IN | WEIGHT: 216 LBS | SYSTOLIC BLOOD PRESSURE: 132 MMHG | DIASTOLIC BLOOD PRESSURE: 72 MMHG | OXYGEN SATURATION: 96 % | BODY MASS INDEX: 35.99 KG/M2 | HEART RATE: 82 BPM | TEMPERATURE: 98 F

## 2021-02-22 DIAGNOSIS — R35.1 BENIGN PROSTATIC HYPERPLASIA WITH NOCTURIA: ICD-10-CM

## 2021-02-22 DIAGNOSIS — Z00.00 ENCOUNTER FOR PREVENTATIVE ADULT HEALTH CARE EXAMINATION: Primary | ICD-10-CM

## 2021-02-22 DIAGNOSIS — E78.2 MIXED HYPERLIPIDEMIA: ICD-10-CM

## 2021-02-22 DIAGNOSIS — I10 ESSENTIAL HYPERTENSION: ICD-10-CM

## 2021-02-22 DIAGNOSIS — N40.1 BENIGN PROSTATIC HYPERPLASIA WITH NOCTURIA: ICD-10-CM

## 2021-02-22 DIAGNOSIS — J30.9 ALLERGIC RHINITIS, UNSPECIFIED SEASONALITY, UNSPECIFIED TRIGGER: ICD-10-CM

## 2021-02-22 PROCEDURE — 3075F PR MOST RECENT SYSTOLIC BLOOD PRESS GE 130-139MM HG: ICD-10-PCS | Mod: S$GLB,,, | Performed by: INTERNAL MEDICINE

## 2021-02-22 PROCEDURE — 3008F PR BODY MASS INDEX (BMI) DOCUMENTED: ICD-10-PCS | Mod: S$GLB,,, | Performed by: INTERNAL MEDICINE

## 2021-02-22 PROCEDURE — 99396 PR PREVENTIVE VISIT,EST,40-64: ICD-10-PCS | Mod: S$GLB,,, | Performed by: INTERNAL MEDICINE

## 2021-02-22 PROCEDURE — 99396 PREV VISIT EST AGE 40-64: CPT | Mod: S$GLB,,, | Performed by: INTERNAL MEDICINE

## 2021-02-22 PROCEDURE — 3078F DIAST BP <80 MM HG: CPT | Mod: S$GLB,,, | Performed by: INTERNAL MEDICINE

## 2021-02-22 PROCEDURE — 1126F AMNT PAIN NOTED NONE PRSNT: CPT | Mod: S$GLB,,, | Performed by: INTERNAL MEDICINE

## 2021-02-22 PROCEDURE — 1126F PR PAIN SEVERITY QUANTIFIED, NO PAIN PRESENT: ICD-10-PCS | Mod: S$GLB,,, | Performed by: INTERNAL MEDICINE

## 2021-02-22 PROCEDURE — 3075F SYST BP GE 130 - 139MM HG: CPT | Mod: S$GLB,,, | Performed by: INTERNAL MEDICINE

## 2021-02-22 PROCEDURE — 3078F PR MOST RECENT DIASTOLIC BLOOD PRESSURE < 80 MM HG: ICD-10-PCS | Mod: S$GLB,,, | Performed by: INTERNAL MEDICINE

## 2021-02-22 PROCEDURE — 3008F BODY MASS INDEX DOCD: CPT | Mod: S$GLB,,, | Performed by: INTERNAL MEDICINE

## 2021-02-22 RX ORDER — LISINOPRIL 20 MG/1
20 TABLET ORAL 2 TIMES DAILY
Qty: 180 TABLET | Refills: 1 | Status: SHIPPED | OUTPATIENT
Start: 2021-02-22 | End: 2021-07-22 | Stop reason: SDUPTHER

## 2021-02-22 RX ORDER — SIMVASTATIN 40 MG/1
40 TABLET, FILM COATED ORAL NIGHTLY
Qty: 90 TABLET | Refills: 1 | Status: SHIPPED | OUTPATIENT
Start: 2021-02-22 | End: 2021-07-22 | Stop reason: SDUPTHER

## 2021-02-22 RX ORDER — MINERAL OIL
180 ENEMA (ML) RECTAL DAILY
Qty: 90 TABLET | Refills: 1 | Status: SHIPPED | OUTPATIENT
Start: 2021-02-22 | End: 2021-07-22 | Stop reason: SDUPTHER

## 2021-02-22 RX ORDER — DUTASTERIDE 0.5 MG/1
0.5 CAPSULE, LIQUID FILLED ORAL DAILY
Qty: 30 CAPSULE | Refills: 6 | Status: SHIPPED | OUTPATIENT
Start: 2021-02-22 | End: 2021-07-22 | Stop reason: SDUPTHER

## 2021-02-22 RX ORDER — OMEGA-3-ACID ETHYL ESTERS 1 G/1
2 CAPSULE, LIQUID FILLED ORAL 2 TIMES DAILY
Qty: 360 CAPSULE | Refills: 1 | Status: SHIPPED | OUTPATIENT
Start: 2021-02-22 | End: 2021-07-22 | Stop reason: SDUPTHER

## 2021-02-22 RX ORDER — FLUTICASONE PROPIONATE 50 MCG
SPRAY, SUSPENSION (ML) NASAL
Qty: 48 ML | Refills: 1 | Status: SHIPPED | OUTPATIENT
Start: 2021-02-22 | End: 2021-07-22 | Stop reason: SDUPTHER

## 2021-02-22 RX ORDER — MONTELUKAST SODIUM 10 MG/1
10 TABLET ORAL NIGHTLY
Qty: 90 TABLET | Refills: 1 | Status: SHIPPED | OUTPATIENT
Start: 2021-02-22 | End: 2021-07-22 | Stop reason: SDUPTHER

## 2021-05-27 ENCOUNTER — OFFICE VISIT (OUTPATIENT)
Dept: FAMILY MEDICINE | Facility: CLINIC | Age: 56
End: 2021-05-27
Payer: COMMERCIAL

## 2021-05-27 VITALS
OXYGEN SATURATION: 98 % | BODY MASS INDEX: 36.15 KG/M2 | HEART RATE: 96 BPM | HEIGHT: 65 IN | WEIGHT: 217 LBS | TEMPERATURE: 98 F | SYSTOLIC BLOOD PRESSURE: 122 MMHG | DIASTOLIC BLOOD PRESSURE: 70 MMHG

## 2021-05-27 DIAGNOSIS — I10 ESSENTIAL HYPERTENSION: Primary | ICD-10-CM

## 2021-05-27 DIAGNOSIS — Z02.89 ENCOUNTER FOR PHYSICAL EXAMINATION RELATED TO EMPLOYMENT: ICD-10-CM

## 2021-05-27 PROCEDURE — 1126F AMNT PAIN NOTED NONE PRSNT: CPT | Mod: S$GLB,,, | Performed by: INTERNAL MEDICINE

## 2021-05-27 PROCEDURE — 3074F PR MOST RECENT SYSTOLIC BLOOD PRESSURE < 130 MM HG: ICD-10-PCS | Mod: S$GLB,,, | Performed by: INTERNAL MEDICINE

## 2021-05-27 PROCEDURE — 3008F PR BODY MASS INDEX (BMI) DOCUMENTED: ICD-10-PCS | Mod: S$GLB,,, | Performed by: INTERNAL MEDICINE

## 2021-05-27 PROCEDURE — 99396 PR PREVENTIVE VISIT,EST,40-64: ICD-10-PCS | Mod: S$GLB,,, | Performed by: INTERNAL MEDICINE

## 2021-05-27 PROCEDURE — 3078F DIAST BP <80 MM HG: CPT | Mod: S$GLB,,, | Performed by: INTERNAL MEDICINE

## 2021-05-27 PROCEDURE — 1126F PR PAIN SEVERITY QUANTIFIED, NO PAIN PRESENT: ICD-10-PCS | Mod: S$GLB,,, | Performed by: INTERNAL MEDICINE

## 2021-05-27 PROCEDURE — 3078F PR MOST RECENT DIASTOLIC BLOOD PRESSURE < 80 MM HG: ICD-10-PCS | Mod: S$GLB,,, | Performed by: INTERNAL MEDICINE

## 2021-05-27 PROCEDURE — 3074F SYST BP LT 130 MM HG: CPT | Mod: S$GLB,,, | Performed by: INTERNAL MEDICINE

## 2021-05-27 PROCEDURE — 99396 PREV VISIT EST AGE 40-64: CPT | Mod: S$GLB,,, | Performed by: INTERNAL MEDICINE

## 2021-05-27 PROCEDURE — 3008F BODY MASS INDEX DOCD: CPT | Mod: S$GLB,,, | Performed by: INTERNAL MEDICINE

## 2021-05-27 RX ORDER — VIT C/E/ZN/COPPR/LUTEIN/ZEAXAN 250MG-90MG
1 CAPSULE ORAL 2 TIMES DAILY
COMMUNITY

## 2021-05-28 ENCOUNTER — PATIENT MESSAGE (OUTPATIENT)
Dept: FAMILY MEDICINE | Facility: CLINIC | Age: 56
End: 2021-05-28

## 2021-07-22 ENCOUNTER — OFFICE VISIT (OUTPATIENT)
Dept: FAMILY MEDICINE | Facility: CLINIC | Age: 56
End: 2021-07-22
Payer: COMMERCIAL

## 2021-07-22 VITALS
WEIGHT: 221 LBS | OXYGEN SATURATION: 97 % | HEIGHT: 65 IN | DIASTOLIC BLOOD PRESSURE: 76 MMHG | TEMPERATURE: 97 F | HEART RATE: 63 BPM | BODY MASS INDEX: 36.82 KG/M2 | SYSTOLIC BLOOD PRESSURE: 124 MMHG

## 2021-07-22 DIAGNOSIS — I10 ESSENTIAL HYPERTENSION: Primary | ICD-10-CM

## 2021-07-22 DIAGNOSIS — N40.1 BENIGN PROSTATIC HYPERPLASIA WITH NOCTURIA: ICD-10-CM

## 2021-07-22 DIAGNOSIS — J30.9 ALLERGIC RHINITIS, UNSPECIFIED SEASONALITY, UNSPECIFIED TRIGGER: ICD-10-CM

## 2021-07-22 DIAGNOSIS — R35.1 BENIGN PROSTATIC HYPERPLASIA WITH NOCTURIA: ICD-10-CM

## 2021-07-22 DIAGNOSIS — E78.2 MIXED HYPERLIPIDEMIA: ICD-10-CM

## 2021-07-22 PROCEDURE — 3008F BODY MASS INDEX DOCD: CPT | Mod: S$GLB,,, | Performed by: INTERNAL MEDICINE

## 2021-07-22 PROCEDURE — 3008F PR BODY MASS INDEX (BMI) DOCUMENTED: ICD-10-PCS | Mod: S$GLB,,, | Performed by: INTERNAL MEDICINE

## 2021-07-22 PROCEDURE — 99214 OFFICE O/P EST MOD 30 MIN: CPT | Mod: S$GLB,,, | Performed by: INTERNAL MEDICINE

## 2021-07-22 PROCEDURE — 1126F AMNT PAIN NOTED NONE PRSNT: CPT | Mod: S$GLB,,, | Performed by: INTERNAL MEDICINE

## 2021-07-22 PROCEDURE — 3074F SYST BP LT 130 MM HG: CPT | Mod: S$GLB,,, | Performed by: INTERNAL MEDICINE

## 2021-07-22 PROCEDURE — 3078F PR MOST RECENT DIASTOLIC BLOOD PRESSURE < 80 MM HG: ICD-10-PCS | Mod: S$GLB,,, | Performed by: INTERNAL MEDICINE

## 2021-07-22 PROCEDURE — 3074F PR MOST RECENT SYSTOLIC BLOOD PRESSURE < 130 MM HG: ICD-10-PCS | Mod: S$GLB,,, | Performed by: INTERNAL MEDICINE

## 2021-07-22 PROCEDURE — 3078F DIAST BP <80 MM HG: CPT | Mod: S$GLB,,, | Performed by: INTERNAL MEDICINE

## 2021-07-22 PROCEDURE — 99214 PR OFFICE/OUTPT VISIT, EST, LEVL IV, 30-39 MIN: ICD-10-PCS | Mod: S$GLB,,, | Performed by: INTERNAL MEDICINE

## 2021-07-22 PROCEDURE — 1126F PR PAIN SEVERITY QUANTIFIED, NO PAIN PRESENT: ICD-10-PCS | Mod: S$GLB,,, | Performed by: INTERNAL MEDICINE

## 2021-07-22 RX ORDER — TADALAFIL 5 MG/1
5 TABLET ORAL DAILY
Qty: 30 TABLET | Refills: 6 | Status: SHIPPED | OUTPATIENT
Start: 2021-07-22 | End: 2021-09-15

## 2021-07-22 RX ORDER — LISINOPRIL 20 MG/1
20 TABLET ORAL 2 TIMES DAILY
Qty: 180 TABLET | Refills: 1 | Status: SHIPPED | OUTPATIENT
Start: 2021-07-22 | End: 2021-12-15 | Stop reason: SDUPTHER

## 2021-07-22 RX ORDER — SIMVASTATIN 40 MG/1
40 TABLET, FILM COATED ORAL NIGHTLY
Qty: 90 TABLET | Refills: 1 | Status: SHIPPED | OUTPATIENT
Start: 2021-07-22 | End: 2021-12-15 | Stop reason: SDUPTHER

## 2021-07-22 RX ORDER — FLUTICASONE PROPIONATE 50 MCG
SPRAY, SUSPENSION (ML) NASAL
Qty: 48 ML | Refills: 1 | Status: SHIPPED | OUTPATIENT
Start: 2021-07-22 | End: 2021-12-15 | Stop reason: SDUPTHER

## 2021-07-22 RX ORDER — MONTELUKAST SODIUM 10 MG/1
10 TABLET ORAL NIGHTLY
Qty: 90 TABLET | Refills: 1 | Status: SHIPPED | OUTPATIENT
Start: 2021-07-22 | End: 2021-12-15 | Stop reason: SDUPTHER

## 2021-07-22 RX ORDER — DUTASTERIDE 0.5 MG/1
0.5 CAPSULE, LIQUID FILLED ORAL DAILY
Qty: 30 CAPSULE | Refills: 6 | Status: SHIPPED | OUTPATIENT
Start: 2021-07-22 | End: 2021-12-15 | Stop reason: SDUPTHER

## 2021-07-22 RX ORDER — OMEGA-3-ACID ETHYL ESTERS 1 G/1
2 CAPSULE, LIQUID FILLED ORAL 2 TIMES DAILY
Qty: 360 CAPSULE | Refills: 1 | Status: SHIPPED | OUTPATIENT
Start: 2021-07-22 | End: 2021-12-15 | Stop reason: SDUPTHER

## 2021-07-22 RX ORDER — MINERAL OIL
180 ENEMA (ML) RECTAL DAILY
Qty: 90 TABLET | Refills: 1 | Status: SHIPPED | OUTPATIENT
Start: 2021-07-22 | End: 2022-05-03

## 2021-09-02 ENCOUNTER — OFFICE VISIT (OUTPATIENT)
Dept: URGENT CARE | Facility: CLINIC | Age: 56
End: 2021-09-02
Payer: COMMERCIAL

## 2021-09-02 VITALS
TEMPERATURE: 99 F | SYSTOLIC BLOOD PRESSURE: 137 MMHG | OXYGEN SATURATION: 95 % | HEIGHT: 65 IN | WEIGHT: 221 LBS | BODY MASS INDEX: 36.82 KG/M2 | HEART RATE: 76 BPM | RESPIRATION RATE: 18 BRPM | DIASTOLIC BLOOD PRESSURE: 82 MMHG

## 2021-09-02 DIAGNOSIS — Z11.52 ENCOUNTER FOR SCREENING FOR COVID-19: Primary | ICD-10-CM

## 2021-09-02 DIAGNOSIS — Z20.822 LAB TEST NEGATIVE FOR COVID-19 VIRUS: ICD-10-CM

## 2021-09-02 LAB
CTP QC/QA: YES
SARS-COV-2 AG RESP QL IA.RAPID: NEGATIVE

## 2021-09-02 PROCEDURE — 3075F PR MOST RECENT SYSTOLIC BLOOD PRESS GE 130-139MM HG: ICD-10-PCS | Mod: S$GLB,,, | Performed by: STUDENT IN AN ORGANIZED HEALTH CARE EDUCATION/TRAINING PROGRAM

## 2021-09-02 PROCEDURE — 1159F PR MEDICATION LIST DOCUMENTED IN MEDICAL RECORD: ICD-10-PCS | Mod: S$GLB,,, | Performed by: STUDENT IN AN ORGANIZED HEALTH CARE EDUCATION/TRAINING PROGRAM

## 2021-09-02 PROCEDURE — 1160F PR REVIEW ALL MEDS BY PRESCRIBER/CLIN PHARMACIST DOCUMENTED: ICD-10-PCS | Mod: S$GLB,,, | Performed by: STUDENT IN AN ORGANIZED HEALTH CARE EDUCATION/TRAINING PROGRAM

## 2021-09-02 PROCEDURE — 3079F DIAST BP 80-89 MM HG: CPT | Mod: S$GLB,,, | Performed by: STUDENT IN AN ORGANIZED HEALTH CARE EDUCATION/TRAINING PROGRAM

## 2021-09-02 PROCEDURE — 3044F HG A1C LEVEL LT 7.0%: CPT | Mod: S$GLB,,, | Performed by: STUDENT IN AN ORGANIZED HEALTH CARE EDUCATION/TRAINING PROGRAM

## 2021-09-02 PROCEDURE — 3075F SYST BP GE 130 - 139MM HG: CPT | Mod: S$GLB,,, | Performed by: STUDENT IN AN ORGANIZED HEALTH CARE EDUCATION/TRAINING PROGRAM

## 2021-09-02 PROCEDURE — 3079F PR MOST RECENT DIASTOLIC BLOOD PRESSURE 80-89 MM HG: ICD-10-PCS | Mod: S$GLB,,, | Performed by: STUDENT IN AN ORGANIZED HEALTH CARE EDUCATION/TRAINING PROGRAM

## 2021-09-02 PROCEDURE — 99203 PR OFFICE/OUTPT VISIT, NEW, LEVL III, 30-44 MIN: ICD-10-PCS | Mod: S$GLB,,, | Performed by: STUDENT IN AN ORGANIZED HEALTH CARE EDUCATION/TRAINING PROGRAM

## 2021-09-02 PROCEDURE — 87426 SARS CORONAVIRUS 2 ANTIGEN POCT: ICD-10-PCS | Mod: QW,S$GLB,, | Performed by: STUDENT IN AN ORGANIZED HEALTH CARE EDUCATION/TRAINING PROGRAM

## 2021-09-02 PROCEDURE — 1160F RVW MEDS BY RX/DR IN RCRD: CPT | Mod: S$GLB,,, | Performed by: STUDENT IN AN ORGANIZED HEALTH CARE EDUCATION/TRAINING PROGRAM

## 2021-09-02 PROCEDURE — 87426 SARSCOV CORONAVIRUS AG IA: CPT | Mod: QW,S$GLB,, | Performed by: STUDENT IN AN ORGANIZED HEALTH CARE EDUCATION/TRAINING PROGRAM

## 2021-09-02 PROCEDURE — 1159F MED LIST DOCD IN RCRD: CPT | Mod: S$GLB,,, | Performed by: STUDENT IN AN ORGANIZED HEALTH CARE EDUCATION/TRAINING PROGRAM

## 2021-09-02 PROCEDURE — 3044F PR MOST RECENT HEMOGLOBIN A1C LEVEL <7.0%: ICD-10-PCS | Mod: S$GLB,,, | Performed by: STUDENT IN AN ORGANIZED HEALTH CARE EDUCATION/TRAINING PROGRAM

## 2021-09-02 PROCEDURE — 3008F PR BODY MASS INDEX (BMI) DOCUMENTED: ICD-10-PCS | Mod: S$GLB,,, | Performed by: STUDENT IN AN ORGANIZED HEALTH CARE EDUCATION/TRAINING PROGRAM

## 2021-09-02 PROCEDURE — 99203 OFFICE O/P NEW LOW 30 MIN: CPT | Mod: S$GLB,,, | Performed by: STUDENT IN AN ORGANIZED HEALTH CARE EDUCATION/TRAINING PROGRAM

## 2021-09-02 PROCEDURE — 3008F BODY MASS INDEX DOCD: CPT | Mod: S$GLB,,, | Performed by: STUDENT IN AN ORGANIZED HEALTH CARE EDUCATION/TRAINING PROGRAM

## 2021-09-15 ENCOUNTER — PATIENT MESSAGE (OUTPATIENT)
Dept: FAMILY MEDICINE | Facility: CLINIC | Age: 56
End: 2021-09-15

## 2021-09-15 RX ORDER — TADALAFIL 20 MG/1
20 TABLET ORAL EVERY OTHER DAY
Qty: 15 TABLET | Refills: 3 | Status: SHIPPED | OUTPATIENT
Start: 2021-09-15 | End: 2021-12-15

## 2021-09-15 RX ORDER — TADALAFIL 20 MG/1
20 TABLET ORAL EVERY OTHER DAY
Qty: 15 TABLET | Refills: 3 | Status: SHIPPED | OUTPATIENT
Start: 2021-09-15 | End: 2021-09-15 | Stop reason: SDUPTHER

## 2021-12-15 ENCOUNTER — OFFICE VISIT (OUTPATIENT)
Dept: FAMILY MEDICINE | Facility: CLINIC | Age: 56
End: 2021-12-15
Payer: COMMERCIAL

## 2021-12-15 VITALS
HEIGHT: 65 IN | DIASTOLIC BLOOD PRESSURE: 74 MMHG | OXYGEN SATURATION: 97 % | WEIGHT: 222 LBS | BODY MASS INDEX: 36.99 KG/M2 | RESPIRATION RATE: 18 BRPM | TEMPERATURE: 98 F | SYSTOLIC BLOOD PRESSURE: 118 MMHG | HEART RATE: 72 BPM

## 2021-12-15 DIAGNOSIS — R35.1 BENIGN PROSTATIC HYPERPLASIA WITH NOCTURIA: ICD-10-CM

## 2021-12-15 DIAGNOSIS — J30.9 ALLERGIC RHINITIS, UNSPECIFIED SEASONALITY, UNSPECIFIED TRIGGER: ICD-10-CM

## 2021-12-15 DIAGNOSIS — N40.1 BENIGN PROSTATIC HYPERPLASIA WITH NOCTURIA: ICD-10-CM

## 2021-12-15 DIAGNOSIS — K21.9 GASTROESOPHAGEAL REFLUX DISEASE, UNSPECIFIED WHETHER ESOPHAGITIS PRESENT: ICD-10-CM

## 2021-12-15 DIAGNOSIS — E78.2 MIXED HYPERLIPIDEMIA: ICD-10-CM

## 2021-12-15 DIAGNOSIS — I10 ESSENTIAL HYPERTENSION: Primary | ICD-10-CM

## 2021-12-15 PROCEDURE — 99214 OFFICE O/P EST MOD 30 MIN: CPT | Mod: S$GLB,,, | Performed by: INTERNAL MEDICINE

## 2021-12-15 PROCEDURE — 4010F PR ACE/ARB THEARPY RXD/TAKEN: ICD-10-PCS | Mod: S$GLB,,, | Performed by: INTERNAL MEDICINE

## 2021-12-15 PROCEDURE — 99214 PR OFFICE/OUTPT VISIT, EST, LEVL IV, 30-39 MIN: ICD-10-PCS | Mod: S$GLB,,, | Performed by: INTERNAL MEDICINE

## 2021-12-15 PROCEDURE — 4010F ACE/ARB THERAPY RXD/TAKEN: CPT | Mod: S$GLB,,, | Performed by: INTERNAL MEDICINE

## 2021-12-15 RX ORDER — OMEPRAZOLE 20 MG/1
20 CAPSULE, DELAYED RELEASE ORAL DAILY
COMMUNITY
End: 2022-06-27 | Stop reason: SDUPTHER

## 2021-12-15 RX ORDER — OMEGA-3-ACID ETHYL ESTERS 1 G/1
2 CAPSULE, LIQUID FILLED ORAL 2 TIMES DAILY
Qty: 360 CAPSULE | Refills: 1 | Status: SHIPPED | OUTPATIENT
Start: 2021-12-15 | End: 2022-07-31

## 2021-12-15 RX ORDER — LISINOPRIL 20 MG/1
20 TABLET ORAL 2 TIMES DAILY
Qty: 180 TABLET | Refills: 1 | Status: SHIPPED | OUTPATIENT
Start: 2021-12-15 | End: 2022-06-27 | Stop reason: SDUPTHER

## 2021-12-15 RX ORDER — DUTASTERIDE 0.5 MG/1
0.5 CAPSULE, LIQUID FILLED ORAL DAILY
Qty: 90 CAPSULE | Refills: 1 | Status: SHIPPED | OUTPATIENT
Start: 2021-12-15 | End: 2022-06-27 | Stop reason: SDUPTHER

## 2021-12-15 RX ORDER — SILDENAFIL 50 MG/1
50 TABLET, FILM COATED ORAL DAILY PRN
Qty: 30 TABLET | Refills: 6 | Status: SHIPPED | OUTPATIENT
Start: 2021-12-15 | End: 2022-06-27 | Stop reason: SDUPTHER

## 2021-12-15 RX ORDER — FAMOTIDINE 20 MG/1
20 TABLET, FILM COATED ORAL 2 TIMES DAILY
Qty: 60 TABLET | Refills: 6 | Status: SHIPPED | OUTPATIENT
Start: 2021-12-15 | End: 2022-06-27 | Stop reason: SDUPTHER

## 2021-12-15 RX ORDER — SIMVASTATIN 40 MG/1
40 TABLET, FILM COATED ORAL NIGHTLY
Qty: 90 TABLET | Refills: 1 | Status: SHIPPED | OUTPATIENT
Start: 2021-12-15 | End: 2022-06-27 | Stop reason: SDUPTHER

## 2021-12-15 RX ORDER — FLUTICASONE PROPIONATE 50 MCG
SPRAY, SUSPENSION (ML) NASAL
Qty: 48 ML | Refills: 1 | Status: SHIPPED | OUTPATIENT
Start: 2021-12-15 | End: 2022-06-27 | Stop reason: SDUPTHER

## 2021-12-15 RX ORDER — MONTELUKAST SODIUM 10 MG/1
10 TABLET ORAL NIGHTLY
Qty: 90 TABLET | Refills: 1 | Status: SHIPPED | OUTPATIENT
Start: 2021-12-15 | End: 2022-06-27 | Stop reason: SDUPTHER

## 2021-12-15 RX ORDER — TAMSULOSIN HYDROCHLORIDE 0.4 MG/1
0.4 CAPSULE ORAL DAILY
Qty: 30 CAPSULE | Refills: 6 | Status: SHIPPED | OUTPATIENT
Start: 2021-12-15 | End: 2022-06-27 | Stop reason: SDUPTHER

## 2022-02-09 ENCOUNTER — OFFICE VISIT (OUTPATIENT)
Dept: URGENT CARE | Facility: CLINIC | Age: 57
End: 2022-02-09
Payer: COMMERCIAL

## 2022-02-09 VITALS
OXYGEN SATURATION: 96 % | TEMPERATURE: 98 F | SYSTOLIC BLOOD PRESSURE: 153 MMHG | DIASTOLIC BLOOD PRESSURE: 85 MMHG | HEART RATE: 88 BPM

## 2022-02-09 DIAGNOSIS — R05.9 COUGH: Primary | ICD-10-CM

## 2022-02-09 DIAGNOSIS — J06.9 UPPER RESPIRATORY TRACT INFECTION, UNSPECIFIED TYPE: ICD-10-CM

## 2022-02-09 LAB
CTP QC/QA: YES
SARS-COV-2 AG RESP QL IA.RAPID: NEGATIVE

## 2022-02-09 PROCEDURE — 1159F PR MEDICATION LIST DOCUMENTED IN MEDICAL RECORD: ICD-10-PCS | Mod: S$GLB,,, | Performed by: NURSE PRACTITIONER

## 2022-02-09 PROCEDURE — 1160F RVW MEDS BY RX/DR IN RCRD: CPT | Mod: S$GLB,,, | Performed by: NURSE PRACTITIONER

## 2022-02-09 PROCEDURE — 87811 SARS-COV-2 COVID19 W/OPTIC: CPT | Mod: S$GLB,,, | Performed by: NURSE PRACTITIONER

## 2022-02-09 PROCEDURE — 99214 PR OFFICE/OUTPT VISIT, EST, LEVL IV, 30-39 MIN: ICD-10-PCS | Mod: S$GLB,,, | Performed by: NURSE PRACTITIONER

## 2022-02-09 PROCEDURE — 1159F MED LIST DOCD IN RCRD: CPT | Mod: S$GLB,,, | Performed by: NURSE PRACTITIONER

## 2022-02-09 PROCEDURE — 87811 SARS CORONAVIRUS 2 ANTIGEN POCT, MANUAL READ: ICD-10-PCS | Mod: S$GLB,,, | Performed by: NURSE PRACTITIONER

## 2022-02-09 PROCEDURE — 99214 OFFICE O/P EST MOD 30 MIN: CPT | Mod: S$GLB,,, | Performed by: NURSE PRACTITIONER

## 2022-02-09 PROCEDURE — 3077F SYST BP >= 140 MM HG: CPT | Mod: S$GLB,,, | Performed by: NURSE PRACTITIONER

## 2022-02-09 PROCEDURE — 1160F PR REVIEW ALL MEDS BY PRESCRIBER/CLIN PHARMACIST DOCUMENTED: ICD-10-PCS | Mod: S$GLB,,, | Performed by: NURSE PRACTITIONER

## 2022-02-09 PROCEDURE — 3077F PR MOST RECENT SYSTOLIC BLOOD PRESSURE >= 140 MM HG: ICD-10-PCS | Mod: S$GLB,,, | Performed by: NURSE PRACTITIONER

## 2022-02-09 PROCEDURE — 3079F DIAST BP 80-89 MM HG: CPT | Mod: S$GLB,,, | Performed by: NURSE PRACTITIONER

## 2022-02-09 PROCEDURE — 3079F PR MOST RECENT DIASTOLIC BLOOD PRESSURE 80-89 MM HG: ICD-10-PCS | Mod: S$GLB,,, | Performed by: NURSE PRACTITIONER

## 2022-02-09 RX ORDER — AMOXICILLIN 875 MG/1
875 TABLET, FILM COATED ORAL EVERY 12 HOURS
Qty: 14 TABLET | Refills: 0 | Status: SHIPPED | OUTPATIENT
Start: 2022-02-09 | End: 2022-02-16

## 2022-02-09 RX ORDER — BENZONATATE 200 MG/1
200 CAPSULE ORAL 3 TIMES DAILY PRN
Qty: 9 CAPSULE | Refills: 0 | Status: SHIPPED | OUTPATIENT
Start: 2022-02-09 | End: 2022-06-27

## 2022-02-09 RX ORDER — PREDNISONE 20 MG/1
20 TABLET ORAL DAILY
Qty: 5 TABLET | Refills: 0 | Status: SHIPPED | OUTPATIENT
Start: 2022-02-09 | End: 2022-02-14

## 2022-02-09 RX ORDER — ALBUTEROL SULFATE 90 UG/1
2 AEROSOL, METERED RESPIRATORY (INHALATION) EVERY 6 HOURS PRN
Qty: 18 G | Refills: 0 | Status: SHIPPED | OUTPATIENT
Start: 2022-02-09 | End: 2022-06-27

## 2022-02-09 NOTE — PROGRESS NOTES
CHIEF COMPLAINT  Chief Complaint   Patient presents with    Cough       HPI  Neo Aguilar Jr.is a 56 y.o. male who presents with complaints of nonproductive cough, headache, sinus congestion and difficulty taking a deep breath.  Patient reports symptoms initially started 8 days ago and his wife had similar symptoms but she is starting to better patient reports he has been taking over-the-counter medications Flonase, Robitussin, Sudafed, TheraFlu, and [place] with no relief of symptoms.  Patient reports he messaged his primary care provider about symptoms who told him to continue those medications if blood pressure was not elevated.  Patient reports he has noticed his blood pressure has been elevated the last few days.  Patient denies fever, dizziness, vision changes, abdominal pain, nausea, vomiting, diarrhea.  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      CURRENT MEDICATIONS  Current Outpatient Medications on File Prior to Visit   Medication Sig Dispense Refill    aspirin (ECOTRIN) 81 MG EC tablet Take 1 tablet (81 mg total) by mouth once daily.  0    dutasteride (AVODART) 0.5 mg capsule Take 1 capsule (0.5 mg total) by mouth once daily. 90 capsule 1    famotidine (PEPCID) 20 MG tablet Take 1 tablet (20 mg total) by mouth 2 (two) times daily. 60 tablet 6    fexofenadine (ALLEGRA) 180 MG tablet Take 1 tablet (180 mg total) by mouth once daily. 90 tablet 1    fluticasone propionate (FLONASE) 50 mcg/actuation nasal spray INHALE 1 SPRAY(S) IN BOTH NOSTRILS ONCE DAILY 48 mL 1    lisinopriL (PRINIVIL,ZESTRIL) 20 MG tablet Take 1 tablet (20 mg total) by mouth 2 (two) times daily. 180 tablet 1    montelukast (SINGULAIR) 10 mg tablet Take 1 tablet (10 mg total) by mouth every evening. 90 tablet 1    omega-3 acid ethyl esters (LOVAZA) 1 gram capsule Take 2 capsules (2 g total) by mouth 2 (two) times daily. 360 capsule 1    omeprazole (PRILOSEC)  20 MG capsule Take 20 mg by mouth once daily.      sildenafiL (VIAGRA) 50 MG tablet Take 1 tablet (50 mg total) by mouth daily as needed for Erectile Dysfunction. 30 tablet 6    simvastatin (ZOCOR) 40 MG tablet Take 1 tablet (40 mg total) by mouth every evening. 90 tablet 1    tamsulosin (FLOMAX) 0.4 mg Cap Take 1 capsule (0.4 mg total) by mouth once daily. 30 capsule 6    vit C,G-Iv-dijfv-lutein-zeaxan (PRESERVISION AREDS-2) 250-90-40-1 mg Cap Take 1 capsule by mouth 2 (two) times a day.        No current facility-administered medications on file prior to visit.       ALLERGIES  Review of patient's allergies indicates:  No Known Allergies    Immunization History   Administered Date(s) Administered    COVID-19, MRNA, LN-S, PF (MODERNA FULL 0.5 ML DOSE) 04/01/2021, 04/30/2021    Influenza 02/14/2020    Influenza - High Dose - PF (65 years and older) 09/30/2020    Influenza - Quadrivalent - PF *Preferred* (6 months and older) 09/10/2018, 09/30/2020, 11/20/2021    Influenza - Trivalent - PF (ADULT) 11/16/2016    Tdap 03/27/2018       PAST MEDICAL HISTORY  Past Medical History:   Diagnosis Date    Abnormal liver enzymes     Allergic rhinitis     Hyperlipidemia     Hypertension     Prostatitis        SURGICAL HISTORY  Past Surgical History:   Procedure Laterality Date    COLONOSCOPY  02/27/2018       SOCIAL HISTORY  Social History     Socioeconomic History    Marital status:    Occupational History    Occupation: offshore    Tobacco Use    Smoking status: Former Smoker    Smokeless tobacco: Never Used   Substance and Sexual Activity    Alcohol use: Yes     Comment: occasional    Drug use: No    Sexual activity: Yes     Partners: Female     Comment:    Social History Narrative    Live with wife       FAMILY HISTORY  Family History   Problem Relation Age of Onset    Coronary artery disease Father         >55    Cystic fibrosis Son        REVIEW OF SYSTEMS  Constitutional:  No fever, chills, or weakness.  Eyes: No redness, pain, or discharge  HENT: No ear pain, + headache, no rhinorrhea, no throat pain + sinus congestion  Respiratory:  Nonproductive cough, no wheezing or shortness of breath  Cardiovascular: No chest pain, palpitations or edema  GI: No abdominal pain, nausea, vomiting or diarrhea  Gu: No dysuria, no hematuria, or discharge  Musculoskeletal: No pain, full range of motion. Good sensation  Skin: No rash or abrasion  Neurologic: No focal weakness or sensory changes.  All systems otherwise negative except as noted in the Review of Systems and History of Present Illness      PHYSICAL EXAM  Reviewed Triage Note  VITAL SIGNS: 153/85  Constitutional: Well developed, well nourished, Alert and oriented x3, No acute distress, obviously ill.  HENT: Normocephalic, Atraumatic, Bilateral external ears normal, , bilateral ear canals clear without edema or erythema, external nose negative, bilateral turbinates erythematous with edema, oropharynx moist, No oral exudates.   Eyes: EOMI, Conjunctiva normal, No discharge.  Neck: Normal range of motion, no tenderness, supple, no carotid bruits  Respiratory: Normal breath sounds, no respiratory distress, no wheezing, no rhonchi, no rales  Cardiovascular:  HR 88, normal rhythm, no murmurs, no rubs, no gallops.  Musculoskeletal: No edema, no tenderness, no cyanosis, no clubbing. Good range of motion in all major joints. No major deformities noted.   Integument: Warm, Dry, No erythema, no rash  Neurologic: Normal motor function, normal sensory function. No focal deficits noted. Intact distal pulses  Psychiatric: Affect normal, judgment normal, mood normal      LABS  Pertinent labs reviewed. (see chart for details)  COVID negative    RADIOLOGY  No orders to display         PROCEDURE  Procedures      Physical exam findings and lab results discussed with patient. No acute emergent medical condition identified at this time to warrant further testing.  Will dispo home with instructions to follow up with PCP tomorrow. Pt agrees with plan of care.     DISPOSITION  Patient discharged in stable condition     CLINICAL IMPRESSION:  The primary encounter diagnosis was Cough. A diagnosis of Upper respiratory tract infection, unspecified type was also pertinent to this visit.    Patient advised to follow-up with your PCP within 3 days for BP re-check if Blood Pressure was >120/80 without history of hypertension.

## 2022-02-09 NOTE — PROGRESS NOTES
Subjective:       Patient ID: Neo Aguilar Jr. is a 56 y.o. male.    Vitals:  oral temperature is 98.2 °F (36.8 °C). His blood pressure is 153/85 (abnormal) and his pulse is 88. His oxygen saturation is 96%.     Chief Complaint: Cough    Cough  This is a new problem. The current episode started in the past 7 days.       Respiratory: Positive for cough.        Objective:      Physical Exam      Assessment:       No diagnosis found.      Plan:         There are no diagnoses linked to this encounter.

## 2022-06-27 ENCOUNTER — OFFICE VISIT (OUTPATIENT)
Dept: FAMILY MEDICINE | Facility: CLINIC | Age: 57
End: 2022-06-27
Payer: COMMERCIAL

## 2022-06-27 VITALS
WEIGHT: 223 LBS | DIASTOLIC BLOOD PRESSURE: 76 MMHG | SYSTOLIC BLOOD PRESSURE: 120 MMHG | BODY MASS INDEX: 37.15 KG/M2 | HEART RATE: 70 BPM | OXYGEN SATURATION: 96 % | HEIGHT: 65 IN | TEMPERATURE: 97 F

## 2022-06-27 DIAGNOSIS — R35.1 BENIGN PROSTATIC HYPERPLASIA WITH NOCTURIA: ICD-10-CM

## 2022-06-27 DIAGNOSIS — J30.9 ALLERGIC RHINITIS, UNSPECIFIED SEASONALITY, UNSPECIFIED TRIGGER: ICD-10-CM

## 2022-06-27 DIAGNOSIS — N40.1 BENIGN PROSTATIC HYPERPLASIA WITH NOCTURIA: ICD-10-CM

## 2022-06-27 DIAGNOSIS — K21.9 GASTROESOPHAGEAL REFLUX DISEASE, UNSPECIFIED WHETHER ESOPHAGITIS PRESENT: ICD-10-CM

## 2022-06-27 DIAGNOSIS — E78.2 MIXED HYPERLIPIDEMIA: ICD-10-CM

## 2022-06-27 DIAGNOSIS — I10 ESSENTIAL HYPERTENSION: Primary | ICD-10-CM

## 2022-06-27 PROCEDURE — 3078F DIAST BP <80 MM HG: CPT | Mod: CPTII,S$GLB,, | Performed by: INTERNAL MEDICINE

## 2022-06-27 PROCEDURE — 99214 PR OFFICE/OUTPT VISIT, EST, LEVL IV, 30-39 MIN: ICD-10-PCS | Mod: S$GLB,,, | Performed by: INTERNAL MEDICINE

## 2022-06-27 PROCEDURE — 3074F SYST BP LT 130 MM HG: CPT | Mod: CPTII,S$GLB,, | Performed by: INTERNAL MEDICINE

## 2022-06-27 PROCEDURE — 4010F PR ACE/ARB THEARPY RXD/TAKEN: ICD-10-PCS | Mod: CPTII,S$GLB,, | Performed by: INTERNAL MEDICINE

## 2022-06-27 PROCEDURE — 4010F ACE/ARB THERAPY RXD/TAKEN: CPT | Mod: CPTII,S$GLB,, | Performed by: INTERNAL MEDICINE

## 2022-06-27 PROCEDURE — 3008F PR BODY MASS INDEX (BMI) DOCUMENTED: ICD-10-PCS | Mod: CPTII,S$GLB,, | Performed by: INTERNAL MEDICINE

## 2022-06-27 PROCEDURE — 3008F BODY MASS INDEX DOCD: CPT | Mod: CPTII,S$GLB,, | Performed by: INTERNAL MEDICINE

## 2022-06-27 PROCEDURE — 1159F PR MEDICATION LIST DOCUMENTED IN MEDICAL RECORD: ICD-10-PCS | Mod: CPTII,S$GLB,, | Performed by: INTERNAL MEDICINE

## 2022-06-27 PROCEDURE — 1159F MED LIST DOCD IN RCRD: CPT | Mod: CPTII,S$GLB,, | Performed by: INTERNAL MEDICINE

## 2022-06-27 PROCEDURE — 3074F PR MOST RECENT SYSTOLIC BLOOD PRESSURE < 130 MM HG: ICD-10-PCS | Mod: CPTII,S$GLB,, | Performed by: INTERNAL MEDICINE

## 2022-06-27 PROCEDURE — 99214 OFFICE O/P EST MOD 30 MIN: CPT | Mod: S$GLB,,, | Performed by: INTERNAL MEDICINE

## 2022-06-27 PROCEDURE — 3078F PR MOST RECENT DIASTOLIC BLOOD PRESSURE < 80 MM HG: ICD-10-PCS | Mod: CPTII,S$GLB,, | Performed by: INTERNAL MEDICINE

## 2022-06-27 RX ORDER — SILDENAFIL 50 MG/1
50 TABLET, FILM COATED ORAL DAILY
Qty: 90 TABLET | Refills: 1 | Status: SHIPPED | OUTPATIENT
Start: 2022-06-27 | End: 2022-11-29 | Stop reason: SDUPTHER

## 2022-06-27 RX ORDER — MONTELUKAST SODIUM 10 MG/1
10 TABLET ORAL NIGHTLY
Qty: 90 TABLET | Refills: 1 | Status: SHIPPED | OUTPATIENT
Start: 2022-06-27 | End: 2022-11-29 | Stop reason: SDUPTHER

## 2022-06-27 RX ORDER — FLUTICASONE PROPIONATE 50 MCG
SPRAY, SUSPENSION (ML) NASAL
Qty: 48 ML | Refills: 1 | Status: SHIPPED | OUTPATIENT
Start: 2022-06-27 | End: 2023-08-02 | Stop reason: SDUPTHER

## 2022-06-27 RX ORDER — DUTASTERIDE 0.5 MG/1
0.5 CAPSULE, LIQUID FILLED ORAL DAILY
Qty: 90 CAPSULE | Refills: 1 | Status: SHIPPED | OUTPATIENT
Start: 2022-06-27 | End: 2022-11-29 | Stop reason: SDUPTHER

## 2022-06-27 RX ORDER — LISINOPRIL 20 MG/1
20 TABLET ORAL 2 TIMES DAILY
Qty: 180 TABLET | Refills: 1 | Status: SHIPPED | OUTPATIENT
Start: 2022-06-27 | End: 2022-11-29 | Stop reason: SDUPTHER

## 2022-06-27 RX ORDER — SILDENAFIL 50 MG/1
50 TABLET, FILM COATED ORAL DAILY
Qty: 90 TABLET | Refills: 1 | Status: SHIPPED | OUTPATIENT
Start: 2022-06-27 | End: 2022-06-27 | Stop reason: SDUPTHER

## 2022-06-27 RX ORDER — OMEPRAZOLE 20 MG/1
20 CAPSULE, DELAYED RELEASE ORAL DAILY
Qty: 90 CAPSULE | Refills: 1 | Status: SHIPPED | OUTPATIENT
Start: 2022-06-27 | End: 2022-11-29

## 2022-06-27 RX ORDER — FAMOTIDINE 20 MG/1
20 TABLET, FILM COATED ORAL 2 TIMES DAILY
Qty: 180 TABLET | Refills: 1 | Status: SHIPPED | OUTPATIENT
Start: 2022-06-27 | End: 2022-11-29 | Stop reason: SDUPTHER

## 2022-06-27 RX ORDER — SIMVASTATIN 40 MG/1
40 TABLET, FILM COATED ORAL NIGHTLY
Qty: 90 TABLET | Refills: 1 | Status: SHIPPED | OUTPATIENT
Start: 2022-06-27 | End: 2022-11-29 | Stop reason: SDUPTHER

## 2022-06-27 RX ORDER — TAMSULOSIN HYDROCHLORIDE 0.4 MG/1
0.4 CAPSULE ORAL DAILY
Qty: 90 CAPSULE | Refills: 1 | Status: SHIPPED | OUTPATIENT
Start: 2022-06-27 | End: 2022-11-29 | Stop reason: SDUPTHER

## 2022-06-27 NOTE — TELEPHONE ENCOUNTER
----- Message from Marcia Rivera sent at 6/27/2022 10:38 AM CDT -----  Regarding: pharmacy change  Patient need ONLY sildenafil sent to [facility] [address]. Patient pays cash for just that med

## 2022-06-27 NOTE — PROGRESS NOTES
Subjective:       Patient ID: Neo Aguilar Jr. is a 57 y.o. male.    Chief Complaint: Hypertension (5 months follow up)    Here for routine follow up; last visit note, most recent available labs, and health maintenance topics reviewed.   Nocturia significantly improved but still present; only getting up one time a night.   He is tolerating the flomax and viagra although still notes retrograde ejaculation.    Hypertension  This is a chronic problem. The problem is controlled. Pertinent negatives include no anxiety, chest pain, headaches, malaise/fatigue, neck pain, palpitations, peripheral edema or shortness of breath. Risk factors for coronary artery disease include male gender, obesity and dyslipidemia. Past treatments include ACE inhibitors. There are no compliance problems.    Hyperlipidemia  This is a chronic problem. The problem is controlled. Recent lipid tests were reviewed and are normal. Pertinent negatives include no chest pain, myalgias or shortness of breath. Current antihyperlipidemic treatment includes statins (fish oil). The current treatment provides significant improvement of lipids. There are no compliance problems.  Risk factors for coronary artery disease include hypertension, male sex and obesity.   Gastroesophageal Reflux  He reports no abdominal pain, no chest pain, no choking, no coughing, no nausea, no sore throat or no wheezing. The current episode started more than 1 year ago. The problem occurs occasionally. The problem has been gradually improving. The heartburn duration is several minutes. The heartburn does not wake him from sleep. The symptoms are aggravated by certain foods (sandhya red sauces and carbonated drinks). Pertinent negatives include no fatigue. He has tried a PPI, an antacid and a histamine-2 antagonist (occasionally has to take TUMS but overall controlled with PPI and H2 blocker) for the symptoms. The treatment provided significant relief.     Review of Systems    Constitutional: Negative for activity change, appetite change, chills, diaphoresis, fatigue, fever, malaise/fatigue and unexpected weight change.   HENT: Negative for congestion, ear discharge, ear pain, hearing loss, nosebleeds, postnasal drip, rhinorrhea, sinus pressure, sinus pain, sneezing, sore throat, tinnitus, trouble swallowing and voice change.    Eyes: Negative for photophobia, pain, discharge, redness, itching and visual disturbance.   Respiratory: Negative for apnea, cough, choking, chest tightness, shortness of breath and wheezing.    Cardiovascular: Negative for chest pain, palpitations and leg swelling.   Gastrointestinal: Negative for abdominal distention, abdominal pain, blood in stool, constipation, diarrhea, nausea and vomiting.   Endocrine: Negative for cold intolerance, heat intolerance, polydipsia and polyuria.   Genitourinary: Negative for decreased urine volume, difficulty urinating, dysuria, enuresis, flank pain, frequency, genital sores, hematuria, penile discharge, penile pain, scrotal swelling, testicular pain and urgency.   Musculoskeletal: Negative for arthralgias, back pain, gait problem, joint swelling, myalgias, neck pain and neck stiffness.   Skin: Negative for rash and wound.   Allergic/Immunologic: Negative for environmental allergies, food allergies and immunocompromised state.   Neurological: Negative for dizziness, tremors, seizures, syncope, facial asymmetry, speech difficulty, weakness, light-headedness, numbness and headaches.   Hematological: Negative for adenopathy. Does not bruise/bleed easily.   Psychiatric/Behavioral: Negative for confusion, decreased concentration, hallucinations, self-injury, sleep disturbance and suicidal ideas. The patient is not nervous/anxious.         Past Medical History:   Diagnosis Date    Abnormal liver enzymes     Allergic rhinitis     Hyperlipidemia     Hypertension     Prostatitis       Past Surgical History:   Procedure Laterality  Date    COLONOSCOPY  02/27/2018       Family History   Problem Relation Age of Onset    Coronary artery disease Father         >55    Cystic fibrosis Son        Social History     Socioeconomic History    Marital status:    Occupational History    Occupation: offshore    Tobacco Use    Smoking status: Former Smoker    Smokeless tobacco: Never Used   Substance and Sexual Activity    Alcohol use: Yes     Comment: occasional    Drug use: No    Sexual activity: Yes     Partners: Female     Comment:    Social History Narrative    Live with wife       Current Outpatient Medications   Medication Sig Dispense Refill    fexofenadine (ALLEGRA) 180 MG tablet TAKE 1 TABLET (180 MG TOTAL) BY MOUTH ONCE DAILY. 90 tablet 1    omega-3 acid ethyl esters (LOVAZA) 1 gram capsule Take 2 capsules (2 g total) by mouth 2 (two) times daily. 360 capsule 1    vit C,P-Jy-ubrio-lutein-zeaxan (PRESERVISION AREDS-2) 250-90-40-1 mg Cap Take 1 capsule by mouth 2 (two) times a day.       albuterol (VENTOLIN HFA) 90 mcg/actuation inhaler Inhale 2 puffs into the lungs every 6 (six) hours as needed for Wheezing. Rescue (Patient not taking: Reported on 6/27/2022) 18 g 0    aspirin (ECOTRIN) 81 MG EC tablet Take 1 tablet (81 mg total) by mouth once daily.  0    dutasteride (AVODART) 0.5 mg capsule Take 1 capsule (0.5 mg total) by mouth once daily. 90 capsule 1    famotidine (PEPCID) 20 MG tablet Take 1 tablet (20 mg total) by mouth 2 (two) times daily. 180 tablet 1    fluticasone propionate (FLONASE) 50 mcg/actuation nasal spray INHALE 1 SPRAY(S) IN BOTH NOSTRILS ONCE DAILY 48 mL 1    lisinopriL (PRINIVIL,ZESTRIL) 20 MG tablet Take 1 tablet (20 mg total) by mouth 2 (two) times daily. 180 tablet 1    montelukast (SINGULAIR) 10 mg tablet Take 1 tablet (10 mg total) by mouth every evening. 90 tablet 1    omeprazole (PRILOSEC) 20 MG capsule Take 1 capsule (20 mg total) by mouth once daily. 90 capsule 1     "sildenafiL (VIAGRA) 50 MG tablet Take 1 tablet (50 mg total) by mouth once daily. 90 tablet 1    simvastatin (ZOCOR) 40 MG tablet Take 1 tablet (40 mg total) by mouth every evening. 90 tablet 1    tamsulosin (FLOMAX) 0.4 mg Cap Take 1 capsule (0.4 mg total) by mouth once daily. 90 capsule 1     No current facility-administered medications for this visit.       Review of patient's allergies indicates:  No Known Allergies  Objective:    HPI     Hypertension      Additional comments: 5 months follow up          Last edited by Kyle Burkett MA on 6/27/2022 10:10 AM. (History)      Blood pressure 120/76, pulse 70, temperature 96.5 °F (35.8 °C), temperature source Temporal, height 5' 5" (1.651 m), weight 101.2 kg (223 lb), SpO2 96 %. Body mass index is 37.11 kg/m².   Physical Exam        Assessment:       1. Essential hypertension    2. Benign prostatic hyperplasia with nocturia    3. Allergic rhinitis, unspecified seasonality, unspecified trigger    4. Gastroesophageal reflux disease, unspecified whether esophagitis present    5. Mixed hyperlipidemia        Plan:       Neo was seen today for hypertension.    Diagnoses and all orders for this visit:    Essential hypertension  -     lisinopriL (PRINIVIL,ZESTRIL) 20 MG tablet; Take 1 tablet (20 mg total) by mouth 2 (two) times daily.  -     Comprehensive Metabolic Panel; Future  -     Lipid Panel; Future  -     Urinalysis; Future    Benign prostatic hyperplasia with nocturia  Comments:  Will retry flomax (GI issues in past) and switch to viagra .  Discussed referral to discuss surgical options but wants to defer for now.   Orders:  -     tamsulosin (FLOMAX) 0.4 mg Cap; Take 1 capsule (0.4 mg total) by mouth once daily.  -     dutasteride (AVODART) 0.5 mg capsule; Take 1 capsule (0.5 mg total) by mouth once daily.  -     sildenafiL (VIAGRA) 50 MG tablet; Take 1 tablet (50 mg total) by mouth once daily.    Allergic rhinitis, unspecified seasonality, unspecified " trigger  -     fluticasone propionate (FLONASE) 50 mcg/actuation nasal spray; INHALE 1 SPRAY(S) IN BOTH NOSTRILS ONCE DAILY  -     montelukast (SINGULAIR) 10 mg tablet; Take 1 tablet (10 mg total) by mouth every evening.    Gastroesophageal reflux disease, unspecified whether esophagitis present  Comments:  Try H2 blocker to see if effective.  If not, continue PPI  Orders:  -     famotidine (PEPCID) 20 MG tablet; Take 1 tablet (20 mg total) by mouth 2 (two) times daily.  -     omeprazole (PRILOSEC) 20 MG capsule; Take 1 capsule (20 mg total) by mouth once daily.    Mixed hyperlipidemia  -     simvastatin (ZOCOR) 40 MG tablet; Take 1 tablet (40 mg total) by mouth every evening.  -     Comprehensive Metabolic Panel; Future  -     Lipid Panel; Future

## 2022-06-29 ENCOUNTER — PATIENT MESSAGE (OUTPATIENT)
Dept: FAMILY MEDICINE | Facility: CLINIC | Age: 57
End: 2022-06-29

## 2022-07-27 ENCOUNTER — LAB VISIT (OUTPATIENT)
Dept: LAB | Facility: CLINIC | Age: 57
End: 2022-07-27
Payer: COMMERCIAL

## 2022-07-27 DIAGNOSIS — E78.2 MIXED HYPERLIPIDEMIA: ICD-10-CM

## 2022-07-27 DIAGNOSIS — I10 ESSENTIAL HYPERTENSION: ICD-10-CM

## 2022-07-27 LAB
ALBUMIN SERPL BCP-MCNC: 3.9 G/DL (ref 3.5–5.2)
ALP SERPL-CCNC: 55 U/L (ref 55–135)
ALT SERPL W/O P-5'-P-CCNC: 37 U/L (ref 10–44)
ANION GAP SERPL CALC-SCNC: 9 MMOL/L (ref 8–16)
AST SERPL-CCNC: 27 U/L (ref 10–40)
BILIRUB SERPL-MCNC: 0.8 MG/DL (ref 0.1–1)
BILIRUB UR QL STRIP: NEGATIVE
BUN SERPL-MCNC: 17 MG/DL (ref 6–20)
CALCIUM SERPL-MCNC: 9.3 MG/DL (ref 8.7–10.5)
CHLORIDE SERPL-SCNC: 105 MMOL/L (ref 95–110)
CHOLEST SERPL-MCNC: 159 MG/DL (ref 120–199)
CHOLEST/HDLC SERPL: 4.2 {RATIO} (ref 2–5)
CLARITY UR: CLEAR
CO2 SERPL-SCNC: 27 MMOL/L (ref 23–29)
COLOR UR: YELLOW
CREAT SERPL-MCNC: 1.3 MG/DL (ref 0.5–1.4)
EST. GFR  (AFRICAN AMERICAN): >60 ML/MIN/1.73 M^2
EST. GFR  (NON AFRICAN AMERICAN): >60 ML/MIN/1.73 M^2
GLUCOSE SERPL-MCNC: 103 MG/DL (ref 70–110)
GLUCOSE UR QL STRIP: NEGATIVE
HDLC SERPL-MCNC: 38 MG/DL (ref 40–75)
HDLC SERPL: 23.9 % (ref 20–50)
HGB UR QL STRIP: NEGATIVE
KETONES UR QL STRIP: NEGATIVE
LDLC SERPL CALC-MCNC: 86 MG/DL (ref 63–159)
LEUKOCYTE ESTERASE UR QL STRIP: NEGATIVE
NITRITE UR QL STRIP: NEGATIVE
NONHDLC SERPL-MCNC: 121 MG/DL
PH UR STRIP: 7 [PH] (ref 5–8)
POTASSIUM SERPL-SCNC: 4.8 MMOL/L (ref 3.5–5.1)
PROT SERPL-MCNC: 7.1 G/DL (ref 6–8.4)
PROT UR QL STRIP: NEGATIVE
SODIUM SERPL-SCNC: 141 MMOL/L (ref 136–145)
SP GR UR STRIP: 1.01 (ref 1–1.03)
TRIGL SERPL-MCNC: 175 MG/DL (ref 30–150)
URN SPEC COLLECT METH UR: NORMAL
UROBILINOGEN UR STRIP-ACNC: NEGATIVE EU/DL

## 2022-07-27 PROCEDURE — 80061 LIPID PANEL: CPT | Performed by: INTERNAL MEDICINE

## 2022-07-27 PROCEDURE — 80053 COMPREHEN METABOLIC PANEL: CPT | Performed by: INTERNAL MEDICINE

## 2022-07-27 PROCEDURE — 81003 URINALYSIS AUTO W/O SCOPE: CPT | Performed by: INTERNAL MEDICINE

## 2022-09-26 ENCOUNTER — PATIENT MESSAGE (OUTPATIENT)
Dept: FAMILY MEDICINE | Facility: CLINIC | Age: 57
End: 2022-09-26

## 2022-11-29 ENCOUNTER — OFFICE VISIT (OUTPATIENT)
Dept: FAMILY MEDICINE | Facility: CLINIC | Age: 57
End: 2022-11-29
Payer: COMMERCIAL

## 2022-11-29 VITALS
WEIGHT: 227 LBS | SYSTOLIC BLOOD PRESSURE: 114 MMHG | HEIGHT: 65 IN | BODY MASS INDEX: 37.82 KG/M2 | OXYGEN SATURATION: 96 % | HEART RATE: 68 BPM | TEMPERATURE: 99 F | DIASTOLIC BLOOD PRESSURE: 76 MMHG

## 2022-11-29 DIAGNOSIS — R35.1 BENIGN PROSTATIC HYPERPLASIA WITH NOCTURIA: ICD-10-CM

## 2022-11-29 DIAGNOSIS — K21.9 GASTROESOPHAGEAL REFLUX DISEASE, UNSPECIFIED WHETHER ESOPHAGITIS PRESENT: ICD-10-CM

## 2022-11-29 DIAGNOSIS — Z23 NEED FOR PROPHYLACTIC VACCINATION AND INOCULATION AGAINST INFLUENZA: ICD-10-CM

## 2022-11-29 DIAGNOSIS — N40.1 BENIGN PROSTATIC HYPERPLASIA WITH NOCTURIA: ICD-10-CM

## 2022-11-29 DIAGNOSIS — I10 ESSENTIAL HYPERTENSION: Primary | ICD-10-CM

## 2022-11-29 DIAGNOSIS — E78.2 MIXED HYPERLIPIDEMIA: ICD-10-CM

## 2022-11-29 DIAGNOSIS — J30.9 ALLERGIC RHINITIS, UNSPECIFIED SEASONALITY, UNSPECIFIED TRIGGER: ICD-10-CM

## 2022-11-29 PROCEDURE — 4010F ACE/ARB THERAPY RXD/TAKEN: CPT | Mod: CPTII,S$GLB,, | Performed by: INTERNAL MEDICINE

## 2022-11-29 PROCEDURE — 1159F MED LIST DOCD IN RCRD: CPT | Mod: CPTII,S$GLB,, | Performed by: INTERNAL MEDICINE

## 2022-11-29 PROCEDURE — 90686 IIV4 VACC NO PRSV 0.5 ML IM: CPT | Mod: S$GLB,,, | Performed by: INTERNAL MEDICINE

## 2022-11-29 PROCEDURE — 99214 OFFICE O/P EST MOD 30 MIN: CPT | Mod: 25,S$GLB,, | Performed by: INTERNAL MEDICINE

## 2022-11-29 PROCEDURE — 90686 FLU VACCINE (QUAD) GREATER THAN OR EQUAL TO 3YO PRESERVATIVE FREE IM: ICD-10-PCS | Mod: S$GLB,,, | Performed by: INTERNAL MEDICINE

## 2022-11-29 PROCEDURE — 3008F BODY MASS INDEX DOCD: CPT | Mod: CPTII,S$GLB,, | Performed by: INTERNAL MEDICINE

## 2022-11-29 PROCEDURE — 90471 IMMUNIZATION ADMIN: CPT | Mod: S$GLB,,, | Performed by: INTERNAL MEDICINE

## 2022-11-29 PROCEDURE — 90471 FLU VACCINE (QUAD) GREATER THAN OR EQUAL TO 3YO PRESERVATIVE FREE IM: ICD-10-PCS | Mod: S$GLB,,, | Performed by: INTERNAL MEDICINE

## 2022-11-29 PROCEDURE — 4010F PR ACE/ARB THEARPY RXD/TAKEN: ICD-10-PCS | Mod: CPTII,S$GLB,, | Performed by: INTERNAL MEDICINE

## 2022-11-29 PROCEDURE — 3078F DIAST BP <80 MM HG: CPT | Mod: CPTII,S$GLB,, | Performed by: INTERNAL MEDICINE

## 2022-11-29 PROCEDURE — 3074F SYST BP LT 130 MM HG: CPT | Mod: CPTII,S$GLB,, | Performed by: INTERNAL MEDICINE

## 2022-11-29 PROCEDURE — 3074F PR MOST RECENT SYSTOLIC BLOOD PRESSURE < 130 MM HG: ICD-10-PCS | Mod: CPTII,S$GLB,, | Performed by: INTERNAL MEDICINE

## 2022-11-29 PROCEDURE — 3078F PR MOST RECENT DIASTOLIC BLOOD PRESSURE < 80 MM HG: ICD-10-PCS | Mod: CPTII,S$GLB,, | Performed by: INTERNAL MEDICINE

## 2022-11-29 PROCEDURE — 1159F PR MEDICATION LIST DOCUMENTED IN MEDICAL RECORD: ICD-10-PCS | Mod: CPTII,S$GLB,, | Performed by: INTERNAL MEDICINE

## 2022-11-29 PROCEDURE — 3008F PR BODY MASS INDEX (BMI) DOCUMENTED: ICD-10-PCS | Mod: CPTII,S$GLB,, | Performed by: INTERNAL MEDICINE

## 2022-11-29 PROCEDURE — 99214 PR OFFICE/OUTPT VISIT, EST, LEVL IV, 30-39 MIN: ICD-10-PCS | Mod: 25,S$GLB,, | Performed by: INTERNAL MEDICINE

## 2022-11-29 RX ORDER — FAMOTIDINE 20 MG/1
20 TABLET, FILM COATED ORAL 2 TIMES DAILY
Qty: 180 TABLET | Refills: 1 | Status: SHIPPED | OUTPATIENT
Start: 2022-11-29 | End: 2024-01-18

## 2022-11-29 RX ORDER — DUTASTERIDE 0.5 MG/1
0.5 CAPSULE, LIQUID FILLED ORAL DAILY
Qty: 90 CAPSULE | Refills: 1 | Status: SHIPPED | OUTPATIENT
Start: 2022-11-29 | End: 2023-05-29

## 2022-11-29 RX ORDER — TAMSULOSIN HYDROCHLORIDE 0.4 MG/1
0.4 CAPSULE ORAL DAILY
Qty: 90 CAPSULE | Refills: 1 | Status: SHIPPED | OUTPATIENT
Start: 2022-11-29 | End: 2023-06-08

## 2022-11-29 RX ORDER — SIMVASTATIN 40 MG/1
40 TABLET, FILM COATED ORAL NIGHTLY
Qty: 90 TABLET | Refills: 1 | Status: SHIPPED | OUTPATIENT
Start: 2022-11-29 | End: 2023-06-08

## 2022-11-29 RX ORDER — SILDENAFIL 50 MG/1
50 TABLET, FILM COATED ORAL DAILY
Qty: 90 TABLET | Refills: 1 | Status: SHIPPED | OUTPATIENT
Start: 2022-11-29 | End: 2024-01-18 | Stop reason: SDUPTHER

## 2022-11-29 RX ORDER — MONTELUKAST SODIUM 10 MG/1
10 TABLET ORAL NIGHTLY
Qty: 90 TABLET | Refills: 1 | Status: SHIPPED | OUTPATIENT
Start: 2022-11-29 | End: 2023-06-08

## 2022-11-29 RX ORDER — LISINOPRIL 20 MG/1
20 TABLET ORAL 2 TIMES DAILY
Qty: 180 TABLET | Refills: 1 | Status: SHIPPED | OUTPATIENT
Start: 2022-11-29 | End: 2023-06-08

## 2022-11-29 RX ORDER — MINERAL OIL
180 ENEMA (ML) RECTAL DAILY
Qty: 90 TABLET | Refills: 1 | Status: SHIPPED | OUTPATIENT
Start: 2022-11-29

## 2022-11-29 NOTE — PROGRESS NOTES
Subjective:       Patient ID: Neo Aguilar Jr. is a 57 y.o. male.    Chief Complaint: Hypertension (5 months follow up)    Here for routine follow up; last visit note, most recent available labs, and health maintenance topics reviewed.   Nocturia stable; getting up one time a night most nights but sometimes gets up 2-3 times.   He is tolerating the meds although still notes retrograde ejaculation.    Hypertension  This is a chronic problem. The problem is controlled. Pertinent negatives include no anxiety, chest pain, headaches, malaise/fatigue, neck pain, palpitations, peripheral edema or shortness of breath. Risk factors for coronary artery disease include male gender, obesity and dyslipidemia. Past treatments include ACE inhibitors. There are no compliance problems.    Hyperlipidemia  This is a chronic problem. The problem is controlled. Recent lipid tests were reviewed and are normal. Pertinent negatives include no chest pain, myalgias or shortness of breath. Current antihyperlipidemic treatment includes statins (fish oil). The current treatment provides significant improvement of lipids. There are no compliance problems.  Risk factors for coronary artery disease include hypertension, male sex and obesity.   Gastroesophageal Reflux  He reports no abdominal pain, no chest pain, no choking, no coughing, no nausea, no sore throat or no wheezing. The current episode started more than 1 year ago. The problem occurs occasionally. The problem has been gradually improving. The heartburn duration is several minutes. The heartburn does not wake him from sleep. The symptoms are aggravated by certain foods (sandhya red sauces and carbonated drinks). Pertinent negatives include no fatigue. He has tried a PPI, an antacid and a histamine-2 antagonist (occasionally has to take TUMS but overall controlled with PPI and H2 blocker) for the symptoms. The treatment provided significant relief.   Review of Systems    Constitutional:  Negative for activity change, appetite change, chills, diaphoresis, fatigue, fever, malaise/fatigue and unexpected weight change.   HENT:  Negative for congestion, ear discharge, ear pain, hearing loss, nosebleeds, postnasal drip, rhinorrhea, sinus pressure, sinus pain, sneezing, sore throat, tinnitus, trouble swallowing and voice change.    Eyes:  Negative for photophobia, pain, discharge, redness, itching and visual disturbance.   Respiratory:  Negative for apnea, cough, choking, chest tightness, shortness of breath and wheezing.    Cardiovascular:  Negative for chest pain, palpitations and leg swelling.   Gastrointestinal:  Negative for abdominal distention, abdominal pain, blood in stool, constipation, diarrhea, nausea and vomiting.   Endocrine: Negative for cold intolerance, heat intolerance, polydipsia and polyuria.   Genitourinary:  Negative for decreased urine volume, difficulty urinating, dysuria, enuresis, flank pain, frequency, genital sores, hematuria, penile discharge, penile pain, scrotal swelling, testicular pain and urgency.   Musculoskeletal:  Negative for arthralgias, back pain, gait problem, joint swelling, myalgias, neck pain and neck stiffness.   Skin:  Negative for rash and wound.   Allergic/Immunologic: Negative for environmental allergies, food allergies and immunocompromised state.   Neurological:  Negative for dizziness, tremors, seizures, syncope, facial asymmetry, speech difficulty, weakness, light-headedness, numbness and headaches.   Hematological:  Negative for adenopathy. Does not bruise/bleed easily.   Psychiatric/Behavioral:  Negative for confusion, decreased concentration, hallucinations, self-injury, sleep disturbance and suicidal ideas. The patient is not nervous/anxious.      Past Medical History:   Diagnosis Date    Abnormal liver enzymes     Allergic rhinitis     Hyperlipidemia     Hypertension     Prostatitis       Past Surgical History:   Procedure  Laterality Date    COLONOSCOPY  02/27/2018       Family History   Problem Relation Age of Onset    Coronary artery disease Father         >55    Cystic fibrosis Son        Social History     Socioeconomic History    Marital status:    Occupational History    Occupation: offshore    Tobacco Use    Smoking status: Former    Smokeless tobacco: Never   Substance and Sexual Activity    Alcohol use: Yes     Comment: occasional    Drug use: No    Sexual activity: Yes     Partners: Female     Comment:    Social History Narrative    Live with wife       Current Outpatient Medications   Medication Sig Dispense Refill    fluticasone propionate (FLONASE) 50 mcg/actuation nasal spray INHALE 1 SPRAY(S) IN BOTH NOSTRILS ONCE DAILY 48 mL 1    omega-3 acid ethyl esters (LOVAZA) 1 gram capsule TAKE 2 CAPSULES BY MOUTH 2 TIMES DAILY. 360 capsule 1    vit C,D-Gp-dcuej-lutein-zeaxan (PRESERVISION AREDS-2) 250-90-40-1 mg Cap Take 1 capsule by mouth 2 (two) times a day.       aspirin (ECOTRIN) 81 MG EC tablet Take 1 tablet (81 mg total) by mouth once daily.  0    dutasteride (AVODART) 0.5 mg capsule Take 1 capsule (0.5 mg total) by mouth once daily. 90 capsule 1    famotidine (PEPCID) 20 MG tablet Take 1 tablet (20 mg total) by mouth 2 (two) times daily. 180 tablet 1    fexofenadine (ALLEGRA) 180 MG tablet Take 1 tablet (180 mg total) by mouth once daily. 90 tablet 1    lisinopriL (PRINIVIL,ZESTRIL) 20 MG tablet Take 1 tablet (20 mg total) by mouth 2 (two) times daily. 180 tablet 1    montelukast (SINGULAIR) 10 mg tablet Take 1 tablet (10 mg total) by mouth every evening. 90 tablet 1    sildenafiL (VIAGRA) 50 MG tablet Take 1 tablet (50 mg total) by mouth once daily. 90 tablet 1    simvastatin (ZOCOR) 40 MG tablet Take 1 tablet (40 mg total) by mouth every evening. 90 tablet 1    tamsulosin (FLOMAX) 0.4 mg Cap Take 1 capsule (0.4 mg total) by mouth once daily. 90 capsule 1     No current facility-administered  "medications for this visit.       Review of patient's allergies indicates:  No Known Allergies  Objective:    HPI     Hypertension     Additional comments: 5 months follow up          Last edited by Kyle Burkett MA on 11/29/2022 11:01 AM.      Blood pressure 114/76, pulse 68, temperature 98.7 °F (37.1 °C), temperature source Temporal, height 5' 5" (1.651 m), weight 103 kg (227 lb), SpO2 96 %. Body mass index is 37.77 kg/m².   Physical Exam  Vitals and nursing note reviewed.   Constitutional:       General: He is not in acute distress.     Appearance: He is well-developed. He is obese. He is not ill-appearing, toxic-appearing or diaphoretic.   HENT:      Head: Normocephalic and atraumatic.   Eyes:      General: No scleral icterus.        Right eye: No discharge.         Left eye: No discharge.      Conjunctiva/sclera: Conjunctivae normal.   Neck:      Vascular: No carotid bruit.   Cardiovascular:      Rate and Rhythm: Normal rate and regular rhythm.      Heart sounds: Normal heart sounds. No murmur heard.  Pulmonary:      Effort: Pulmonary effort is normal. No respiratory distress.      Breath sounds: Normal breath sounds. No decreased breath sounds, wheezing, rhonchi or rales.   Abdominal:      General: There is no distension.      Palpations: Abdomen is soft.      Tenderness: There is no abdominal tenderness. There is no guarding or rebound.   Musculoskeletal:      Right lower leg: No edema.      Left lower leg: No edema.   Skin:     General: Skin is warm and dry.   Neurological:      Mental Status: He is alert.      Motor: No tremor.   Psychiatric:         Mood and Affect: Mood normal.         Speech: Speech normal.         Behavior: Behavior normal.           Assessment:       1. Essential hypertension    2. Benign prostatic hyperplasia with nocturia    3. Allergic rhinitis, unspecified seasonality, unspecified trigger    4. Gastroesophageal reflux disease, unspecified whether esophagitis present    5. Mixed " hyperlipidemia    6. Need for prophylactic vaccination and inoculation against influenza        Plan:       Neo was seen today for hypertension.    Diagnoses and all orders for this visit:    Essential hypertension  -     lisinopriL (PRINIVIL,ZESTRIL) 20 MG tablet; Take 1 tablet (20 mg total) by mouth 2 (two) times daily.    Benign prostatic hyperplasia with nocturia  Comments:  Will retry flomax (GI issues in past) and switch to viagra .  Discussed referral to discuss surgical options but wants to defer for now.   Orders:  -     tamsulosin (FLOMAX) 0.4 mg Cap; Take 1 capsule (0.4 mg total) by mouth once daily.  -     dutasteride (AVODART) 0.5 mg capsule; Take 1 capsule (0.5 mg total) by mouth once daily.  -     sildenafiL (VIAGRA) 50 MG tablet; Take 1 tablet (50 mg total) by mouth once daily.    Allergic rhinitis, unspecified seasonality, unspecified trigger  -     montelukast (SINGULAIR) 10 mg tablet; Take 1 tablet (10 mg total) by mouth every evening.  -     fexofenadine (ALLEGRA) 180 MG tablet; Take 1 tablet (180 mg total) by mouth once daily.    Gastroesophageal reflux disease, unspecified whether esophagitis present  Comments:  Try H2 blocker to see if effective.  If not, continue PPI  Orders:  -     famotidine (PEPCID) 20 MG tablet; Take 1 tablet (20 mg total) by mouth 2 (two) times daily.    Mixed hyperlipidemia  -     simvastatin (ZOCOR) 40 MG tablet; Take 1 tablet (40 mg total) by mouth every evening.    Need for prophylactic vaccination and inoculation against influenza  -     Influenza - Quadrivalent (PF)

## 2023-01-26 ENCOUNTER — OFFICE VISIT (OUTPATIENT)
Dept: FAMILY MEDICINE | Facility: CLINIC | Age: 58
End: 2023-01-26
Payer: COMMERCIAL

## 2023-01-26 ENCOUNTER — PATIENT MESSAGE (OUTPATIENT)
Dept: FAMILY MEDICINE | Facility: CLINIC | Age: 58
End: 2023-01-26

## 2023-01-26 VITALS
WEIGHT: 230 LBS | DIASTOLIC BLOOD PRESSURE: 76 MMHG | SYSTOLIC BLOOD PRESSURE: 134 MMHG | TEMPERATURE: 98 F | OXYGEN SATURATION: 96 % | HEIGHT: 65 IN | HEART RATE: 68 BPM | BODY MASS INDEX: 38.32 KG/M2

## 2023-01-26 DIAGNOSIS — I10 PRIMARY HYPERTENSION: ICD-10-CM

## 2023-01-26 DIAGNOSIS — Z00.00 ENCOUNTER FOR PREVENTATIVE ADULT HEALTH CARE EXAMINATION: Primary | ICD-10-CM

## 2023-01-26 DIAGNOSIS — E66.01 CLASS 2 SEVERE OBESITY WITH SERIOUS COMORBIDITY AND BODY MASS INDEX (BMI) OF 38.0 TO 38.9 IN ADULT, UNSPECIFIED OBESITY TYPE: ICD-10-CM

## 2023-01-26 DIAGNOSIS — E78.2 MIXED HYPERLIPIDEMIA: ICD-10-CM

## 2023-01-26 PROCEDURE — 3075F SYST BP GE 130 - 139MM HG: CPT | Mod: CPTII,S$GLB,, | Performed by: INTERNAL MEDICINE

## 2023-01-26 PROCEDURE — 1159F MED LIST DOCD IN RCRD: CPT | Mod: CPTII,S$GLB,, | Performed by: INTERNAL MEDICINE

## 2023-01-26 PROCEDURE — 3075F PR MOST RECENT SYSTOLIC BLOOD PRESS GE 130-139MM HG: ICD-10-PCS | Mod: CPTII,S$GLB,, | Performed by: INTERNAL MEDICINE

## 2023-01-26 PROCEDURE — 1160F RVW MEDS BY RX/DR IN RCRD: CPT | Mod: CPTII,S$GLB,, | Performed by: INTERNAL MEDICINE

## 2023-01-26 PROCEDURE — 3078F PR MOST RECENT DIASTOLIC BLOOD PRESSURE < 80 MM HG: ICD-10-PCS | Mod: CPTII,S$GLB,, | Performed by: INTERNAL MEDICINE

## 2023-01-26 PROCEDURE — 3078F DIAST BP <80 MM HG: CPT | Mod: CPTII,S$GLB,, | Performed by: INTERNAL MEDICINE

## 2023-01-26 PROCEDURE — 1160F PR REVIEW ALL MEDS BY PRESCRIBER/CLIN PHARMACIST DOCUMENTED: ICD-10-PCS | Mod: CPTII,S$GLB,, | Performed by: INTERNAL MEDICINE

## 2023-01-26 PROCEDURE — 3008F PR BODY MASS INDEX (BMI) DOCUMENTED: ICD-10-PCS | Mod: CPTII,S$GLB,, | Performed by: INTERNAL MEDICINE

## 2023-01-26 PROCEDURE — 1159F PR MEDICATION LIST DOCUMENTED IN MEDICAL RECORD: ICD-10-PCS | Mod: CPTII,S$GLB,, | Performed by: INTERNAL MEDICINE

## 2023-01-26 PROCEDURE — 99396 PREV VISIT EST AGE 40-64: CPT | Mod: S$GLB,,, | Performed by: INTERNAL MEDICINE

## 2023-01-26 PROCEDURE — 99396 PR PREVENTIVE VISIT,EST,40-64: ICD-10-PCS | Mod: S$GLB,,, | Performed by: INTERNAL MEDICINE

## 2023-01-26 PROCEDURE — 3008F BODY MASS INDEX DOCD: CPT | Mod: CPTII,S$GLB,, | Performed by: INTERNAL MEDICINE

## 2023-01-26 NOTE — PROGRESS NOTES
Subjective:       Patient ID: Neo Aguilar Jr. is a 57 y.o. male.    Chief Complaint: Annual Exam and Visual Field Change (CORRECTED VISION/BOTH EYES 20/20/R/L 20/20/UNCORRECTED /BOTH EYES 20/40/R/L 20/50)    Here for annual well visit; last visit note, most recent available labs, and health maintenance topics reviewed.   He needs forms done for [employer].  Still getting up one time a night most nights but sometimes gets up 2-3 times.   He is tolerating the meds although still notes retrograde ejaculation.  He doesn't feel viagra helps with nocturia so only taking as needed for ED    Hypertension  This is a chronic problem. The problem is controlled. Pertinent negatives include no anxiety, chest pain, headaches, malaise/fatigue, neck pain, palpitations, peripheral edema or shortness of breath. Risk factors for coronary artery disease include male gender, obesity and dyslipidemia. Past treatments include ACE inhibitors. There are no compliance problems.    Hyperlipidemia  This is a chronic problem. The problem is controlled. Recent lipid tests were reviewed and are normal. Pertinent negatives include no chest pain, myalgias or shortness of breath. Current antihyperlipidemic treatment includes statins (fish oil). The current treatment provides significant improvement of lipids. There are no compliance problems.  Risk factors for coronary artery disease include hypertension, male sex and obesity.   Gastroesophageal Reflux  He reports no abdominal pain, no chest pain, no choking, no coughing, no nausea, no sore throat or no wheezing. The current episode started more than 1 year ago. The problem occurs occasionally. The problem has been gradually improving. The heartburn duration is several minutes. The heartburn does not wake him from sleep. The symptoms are aggravated by certain foods (sandhya red sauces and carbonated drinks). Pertinent negatives include no fatigue. He has tried a PPI, an antacid and a  histamine-2 antagonist (occasionally has to take TUMS but overall controlled with PPI and H2 blocker) for the symptoms. The treatment provided significant relief.   Review of Systems   Constitutional:  Negative for activity change, appetite change, chills, diaphoresis, fatigue, fever, malaise/fatigue and unexpected weight change.   HENT:  Negative for congestion, ear discharge, ear pain, hearing loss, nosebleeds, postnasal drip, rhinorrhea, sinus pressure, sinus pain, sneezing, sore throat, tinnitus, trouble swallowing and voice change.    Eyes:  Negative for photophobia, pain, discharge, redness, itching and visual disturbance.   Respiratory:  Negative for apnea, cough, choking, chest tightness, shortness of breath and wheezing.    Cardiovascular:  Negative for chest pain, palpitations and leg swelling.   Gastrointestinal:  Negative for abdominal distention, abdominal pain, blood in stool, constipation, diarrhea, nausea and vomiting.   Endocrine: Negative for cold intolerance, heat intolerance, polydipsia and polyuria.   Genitourinary:  Negative for decreased urine volume, difficulty urinating, dysuria, enuresis, flank pain, frequency, genital sores, hematuria, penile discharge, penile pain, scrotal swelling, testicular pain and urgency.   Musculoskeletal:  Negative for arthralgias, back pain, gait problem, joint swelling, myalgias, neck pain and neck stiffness.   Skin:  Negative for rash and wound.   Allergic/Immunologic: Negative for environmental allergies, food allergies and immunocompromised state.   Neurological:  Negative for dizziness, tremors, seizures, syncope, facial asymmetry, speech difficulty, weakness, light-headedness, numbness and headaches.   Hematological:  Negative for adenopathy. Does not bruise/bleed easily.   Psychiatric/Behavioral:  Negative for confusion, decreased concentration, hallucinations, self-injury, sleep disturbance and suicidal ideas. The patient is not nervous/anxious.      Past  Medical History:   Diagnosis Date    Abnormal liver enzymes     Allergic rhinitis     Hyperlipidemia     Hypertension     Prostatitis       Past Surgical History:   Procedure Laterality Date    COLONOSCOPY  02/27/2018       Family History   Problem Relation Age of Onset    Coronary artery disease Father         >55    Parkinsonism Father     Cystic fibrosis Son        Social History     Socioeconomic History    Marital status:    Occupational History    Occupation: offshore    Tobacco Use    Smoking status: Former    Smokeless tobacco: Never   Substance and Sexual Activity    Alcohol use: Yes     Alcohol/week: 6.0 standard drinks     Types: 6 Standard drinks or equivalent per week     Comment: occasional    Drug use: No    Sexual activity: Yes     Partners: Female     Comment:    Social History Narrative    Live with wife       Current Outpatient Medications   Medication Sig Dispense Refill    dutasteride (AVODART) 0.5 mg capsule Take 1 capsule (0.5 mg total) by mouth once daily. 90 capsule 1    famotidine (PEPCID) 20 MG tablet Take 1 tablet (20 mg total) by mouth 2 (two) times daily. 180 tablet 1    fexofenadine (ALLEGRA) 180 MG tablet Take 1 tablet (180 mg total) by mouth once daily. 90 tablet 1    fluticasone propionate (FLONASE) 50 mcg/actuation nasal spray INHALE 1 SPRAY(S) IN BOTH NOSTRILS ONCE DAILY 48 mL 1    lisinopriL (PRINIVIL,ZESTRIL) 20 MG tablet Take 1 tablet (20 mg total) by mouth 2 (two) times daily. 180 tablet 1    montelukast (SINGULAIR) 10 mg tablet Take 1 tablet (10 mg total) by mouth every evening. 90 tablet 1    omega-3 acid ethyl esters (LOVAZA) 1 gram capsule TAKE 2 CAPSULES BY MOUTH 2 TIMES DAILY. 360 capsule 1    sildenafiL (VIAGRA) 50 MG tablet Take 1 tablet (50 mg total) by mouth once daily. 90 tablet 1    simvastatin (ZOCOR) 40 MG tablet Take 1 tablet (40 mg total) by mouth every evening. 90 tablet 1    tamsulosin (FLOMAX) 0.4 mg Cap Take 1 capsule (0.4 mg  "total) by mouth once daily. 90 capsule 1    vit C,A-Ku-lnzha-lutein-zeaxan (PRESERVISION AREDS-2) 250-90-40-1 mg Cap Take 1 capsule by mouth 2 (two) times a day.       aspirin (ECOTRIN) 81 MG EC tablet Take 1 tablet (81 mg total) by mouth once daily.  0     No current facility-administered medications for this visit.       Review of patient's allergies indicates:  No Known Allergies  Objective:      Blood pressure 134/76, pulse 68, temperature 98.1 °F (36.7 °C), temperature source Temporal, height 5' 5" (1.651 m), weight 104.3 kg (230 lb), SpO2 96 %. Body mass index is 38.27 kg/m².   Physical Exam  Vitals and nursing note reviewed.   Constitutional:       General: He is not in acute distress.     Appearance: Normal appearance. He is well-developed. He is obese. He is not ill-appearing, toxic-appearing or diaphoretic.   HENT:      Head: Normocephalic and atraumatic.      Right Ear: Hearing, tympanic membrane, ear canal and external ear normal. No decreased hearing noted.      Left Ear: Hearing, tympanic membrane, ear canal and external ear normal. No decreased hearing noted.      Ears:      Comments: Forced whisper at 6 feet     Nose: Nose normal.      Mouth/Throat:      Pharynx: Uvula midline.   Eyes:      General: Lids are normal. No visual field deficit or scleral icterus.        Right eye: No discharge.         Left eye: No discharge.      Conjunctiva/sclera: Conjunctivae normal.      Right eye: Right conjunctiva is not injected. No hemorrhage.     Left eye: Left conjunctiva is not injected. No hemorrhage.     Pupils: Pupils are equal, round, and reactive to light.      Comments: Normal color vision   Neck:      Thyroid: No thyromegaly.      Vascular: No carotid bruit.   Cardiovascular:      Rate and Rhythm: Normal rate and regular rhythm.      Pulses:           Dorsalis pedis pulses are 2+ on the right side and 2+ on the left side.      Heart sounds: Normal heart sounds. No murmur heard.    No friction rub. No " gallop.   Pulmonary:      Effort: Pulmonary effort is normal. No respiratory distress.      Breath sounds: Normal breath sounds. No wheezing, rhonchi or rales.   Abdominal:      General: Bowel sounds are normal. There is no distension.      Palpations: Abdomen is soft. There is no mass.      Tenderness: There is no abdominal tenderness. There is no guarding or rebound.      Hernia: No hernia is present.   Lymphadenopathy:      Cervical: No cervical adenopathy.   Skin:     General: Skin is warm and dry.   Neurological:      Mental Status: He is alert.      Cranial Nerves: No cranial nerve deficit.      Motor: No tremor.      Deep Tendon Reflexes: Reflexes are normal and symmetric.   Psychiatric:         Speech: Speech normal.         Behavior: Behavior normal.           Assessment:       1. Encounter for preventative adult health care examination    2. Primary hypertension    3. Mixed hyperlipidemia    4. Class 2 severe obesity with serious comorbidity and body mass index (BMI) of 38.0 to 38.9 in adult, unspecified obesity type        Plan:       Neo was seen today for annual exam and visual field change.    Diagnoses and all orders for this visit:    Encounter for preventative adult health care examination    Primary hypertension    Mixed hyperlipidemia    Class 2 severe obesity with serious comorbidity and body mass index (BMI) of 38.0 to 38.9 in adult, unspecified obesity type  Comments:  Discussed weight loss

## 2023-05-26 DIAGNOSIS — N40.1 BENIGN PROSTATIC HYPERPLASIA WITH NOCTURIA: ICD-10-CM

## 2023-05-26 DIAGNOSIS — R35.1 BENIGN PROSTATIC HYPERPLASIA WITH NOCTURIA: ICD-10-CM

## 2023-05-29 RX ORDER — DUTASTERIDE 0.5 MG/1
0.5 CAPSULE, LIQUID FILLED ORAL DAILY
Qty: 90 CAPSULE | Refills: 1 | Status: SHIPPED | OUTPATIENT
Start: 2023-05-29 | End: 2024-01-18 | Stop reason: SDUPTHER

## 2023-06-08 DIAGNOSIS — I10 ESSENTIAL HYPERTENSION: ICD-10-CM

## 2023-06-08 DIAGNOSIS — N40.1 BENIGN PROSTATIC HYPERPLASIA WITH NOCTURIA: ICD-10-CM

## 2023-06-08 DIAGNOSIS — E78.2 MIXED HYPERLIPIDEMIA: ICD-10-CM

## 2023-06-08 DIAGNOSIS — R35.1 BENIGN PROSTATIC HYPERPLASIA WITH NOCTURIA: ICD-10-CM

## 2023-06-08 DIAGNOSIS — J30.9 ALLERGIC RHINITIS, UNSPECIFIED SEASONALITY, UNSPECIFIED TRIGGER: ICD-10-CM

## 2023-06-08 RX ORDER — LISINOPRIL 20 MG/1
TABLET ORAL
Qty: 180 TABLET | Refills: 1 | Status: SHIPPED | OUTPATIENT
Start: 2023-06-08 | End: 2023-11-14

## 2023-06-08 RX ORDER — MONTELUKAST SODIUM 10 MG/1
TABLET ORAL
Qty: 90 TABLET | Refills: 1 | Status: SHIPPED | OUTPATIENT
Start: 2023-06-08 | End: 2024-01-18 | Stop reason: SDUPTHER

## 2023-06-08 RX ORDER — TAMSULOSIN HYDROCHLORIDE 0.4 MG/1
CAPSULE ORAL
Qty: 90 CAPSULE | Refills: 1 | Status: SHIPPED | OUTPATIENT
Start: 2023-06-08 | End: 2023-08-28 | Stop reason: SDUPTHER

## 2023-06-08 RX ORDER — SIMVASTATIN 40 MG/1
TABLET, FILM COATED ORAL
Qty: 90 TABLET | Refills: 1 | Status: SHIPPED | OUTPATIENT
Start: 2023-06-08 | End: 2023-11-14

## 2023-08-02 ENCOUNTER — OFFICE VISIT (OUTPATIENT)
Dept: FAMILY MEDICINE | Facility: CLINIC | Age: 58
End: 2023-08-02
Payer: COMMERCIAL

## 2023-08-02 VITALS
SYSTOLIC BLOOD PRESSURE: 120 MMHG | TEMPERATURE: 98 F | DIASTOLIC BLOOD PRESSURE: 78 MMHG | HEART RATE: 74 BPM | BODY MASS INDEX: 37.49 KG/M2 | WEIGHT: 225 LBS | OXYGEN SATURATION: 96 % | HEIGHT: 65 IN

## 2023-08-02 DIAGNOSIS — E78.2 MIXED HYPERLIPIDEMIA: ICD-10-CM

## 2023-08-02 DIAGNOSIS — K21.9 GASTROESOPHAGEAL REFLUX DISEASE, UNSPECIFIED WHETHER ESOPHAGITIS PRESENT: ICD-10-CM

## 2023-08-02 DIAGNOSIS — R35.1 BENIGN PROSTATIC HYPERPLASIA WITH NOCTURIA: ICD-10-CM

## 2023-08-02 DIAGNOSIS — N40.1 BENIGN PROSTATIC HYPERPLASIA WITH NOCTURIA: ICD-10-CM

## 2023-08-02 DIAGNOSIS — J30.9 ALLERGIC RHINITIS, UNSPECIFIED SEASONALITY, UNSPECIFIED TRIGGER: ICD-10-CM

## 2023-08-02 DIAGNOSIS — Z12.5 PROSTATE CANCER SCREENING: ICD-10-CM

## 2023-08-02 DIAGNOSIS — I10 ESSENTIAL HYPERTENSION: Primary | ICD-10-CM

## 2023-08-02 PROCEDURE — 3074F SYST BP LT 130 MM HG: CPT | Mod: CPTII,S$GLB,, | Performed by: INTERNAL MEDICINE

## 2023-08-02 PROCEDURE — 3078F DIAST BP <80 MM HG: CPT | Mod: CPTII,S$GLB,, | Performed by: INTERNAL MEDICINE

## 2023-08-02 PROCEDURE — 99214 OFFICE O/P EST MOD 30 MIN: CPT | Mod: S$GLB,,, | Performed by: INTERNAL MEDICINE

## 2023-08-02 PROCEDURE — 99214 PR OFFICE/OUTPT VISIT, EST, LEVL IV, 30-39 MIN: ICD-10-PCS | Mod: S$GLB,,, | Performed by: INTERNAL MEDICINE

## 2023-08-02 PROCEDURE — 3008F BODY MASS INDEX DOCD: CPT | Mod: CPTII,S$GLB,, | Performed by: INTERNAL MEDICINE

## 2023-08-02 PROCEDURE — 3008F PR BODY MASS INDEX (BMI) DOCUMENTED: ICD-10-PCS | Mod: CPTII,S$GLB,, | Performed by: INTERNAL MEDICINE

## 2023-08-02 PROCEDURE — 3078F PR MOST RECENT DIASTOLIC BLOOD PRESSURE < 80 MM HG: ICD-10-PCS | Mod: CPTII,S$GLB,, | Performed by: INTERNAL MEDICINE

## 2023-08-02 PROCEDURE — 4010F PR ACE/ARB THEARPY RXD/TAKEN: ICD-10-PCS | Mod: CPTII,S$GLB,, | Performed by: INTERNAL MEDICINE

## 2023-08-02 PROCEDURE — 1159F PR MEDICATION LIST DOCUMENTED IN MEDICAL RECORD: ICD-10-PCS | Mod: CPTII,S$GLB,, | Performed by: INTERNAL MEDICINE

## 2023-08-02 PROCEDURE — 1159F MED LIST DOCD IN RCRD: CPT | Mod: CPTII,S$GLB,, | Performed by: INTERNAL MEDICINE

## 2023-08-02 PROCEDURE — 4010F ACE/ARB THERAPY RXD/TAKEN: CPT | Mod: CPTII,S$GLB,, | Performed by: INTERNAL MEDICINE

## 2023-08-02 PROCEDURE — 3074F PR MOST RECENT SYSTOLIC BLOOD PRESSURE < 130 MM HG: ICD-10-PCS | Mod: CPTII,S$GLB,, | Performed by: INTERNAL MEDICINE

## 2023-08-02 RX ORDER — FLUTICASONE PROPIONATE 50 MCG
SPRAY, SUSPENSION (ML) NASAL
Qty: 48 ML | Refills: 1 | Status: SHIPPED | OUTPATIENT
Start: 2023-08-02

## 2023-08-02 RX ORDER — OMEGA-3-ACID ETHYL ESTERS 1 G/1
2 CAPSULE, LIQUID FILLED ORAL 2 TIMES DAILY
Qty: 360 CAPSULE | Refills: 1 | Status: SHIPPED | OUTPATIENT
Start: 2023-08-02 | End: 2024-01-18 | Stop reason: SDUPTHER

## 2023-08-02 NOTE — PROGRESS NOTES
Subjective:       Patient ID: Neo Aguilar Jr. is a 58 y.o. male.    Chief Complaint: Hypertension (5 months follow up)    Here for routine follow up; last visit note, most recent available labs, and health maintenance topics reviewed.   Having more issues with urinary frequency, urgency, nocturia (2-3 times per night)  Sense of incomplete emptying (has to go back 10-15 minutes later).   He is interested in surgical options.    Hypertension  This is a chronic problem. The problem is controlled. Pertinent negatives include no anxiety, chest pain, headaches, malaise/fatigue, neck pain, palpitations, peripheral edema or shortness of breath. Risk factors for coronary artery disease include male gender, obesity and dyslipidemia. Past treatments include ACE inhibitors. There are no compliance problems.    Hyperlipidemia  This is a chronic problem. The problem is controlled. Recent lipid tests were reviewed and are normal. Pertinent negatives include no chest pain, myalgias or shortness of breath. Current antihyperlipidemic treatment includes statins (fish oil). The current treatment provides significant improvement of lipids. There are no compliance problems.  Risk factors for coronary artery disease include hypertension, male sex and obesity.   Gastroesophageal Reflux  He reports no abdominal pain, no chest pain, no choking, no coughing, no nausea, no sore throat or no wheezing. The current episode started more than 1 year ago. The problem occurs occasionally. The problem has been gradually improving. The heartburn duration is several minutes. The heartburn does not wake him from sleep. The symptoms are aggravated by certain foods (sandhya red sauces and carbonated drinks). Pertinent negatives include no fatigue. He has tried a PPI, an antacid and a histamine-2 antagonist (occasionally has to take TUMS but overall controlled with PPI and H2 blocker) for the symptoms. The treatment provided significant relief.      Review of Systems   Constitutional:  Negative for activity change, appetite change, chills, diaphoresis, fatigue, fever, malaise/fatigue and unexpected weight change.   HENT:  Negative for congestion, ear discharge, ear pain, hearing loss, nosebleeds, postnasal drip, rhinorrhea, sinus pressure, sinus pain, sneezing, sore throat, tinnitus, trouble swallowing and voice change.    Eyes:  Negative for photophobia, pain, discharge, redness, itching and visual disturbance.   Respiratory:  Negative for apnea, cough, choking, chest tightness, shortness of breath and wheezing.    Cardiovascular:  Negative for chest pain, palpitations and leg swelling.   Gastrointestinal:  Negative for abdominal distention, abdominal pain, blood in stool, constipation, diarrhea, nausea and vomiting.   Endocrine: Negative for cold intolerance, heat intolerance, polydipsia and polyuria.   Genitourinary:  Positive for frequency. Negative for decreased urine volume, difficulty urinating, dysuria, enuresis, flank pain, genital sores, hematuria, penile discharge, penile pain, scrotal swelling, testicular pain and urgency.   Musculoskeletal:  Negative for arthralgias, back pain, gait problem, joint swelling, myalgias, neck pain and neck stiffness.   Skin:  Negative for rash and wound.   Allergic/Immunologic: Negative for environmental allergies, food allergies and immunocompromised state.   Neurological:  Negative for dizziness, tremors, seizures, syncope, facial asymmetry, speech difficulty, weakness, light-headedness, numbness and headaches.   Hematological:  Negative for adenopathy. Does not bruise/bleed easily.   Psychiatric/Behavioral:  Negative for confusion, decreased concentration, hallucinations, self-injury, sleep disturbance and suicidal ideas. The patient is not nervous/anxious.        Past Medical History:   Diagnosis Date    Abnormal liver enzymes     Allergic rhinitis     Hyperlipidemia     Hypertension     Prostatitis       Past  Surgical History:   Procedure Laterality Date    COLONOSCOPY  02/27/2018       Family History   Problem Relation Age of Onset    Coronary artery disease Father         >55    Parkinsonism Father     Cystic fibrosis Son        Social History     Socioeconomic History    Marital status:    Occupational History    Occupation: offshore    Tobacco Use    Smoking status: Former     Current packs/day: 0.00    Smokeless tobacco: Never   Substance and Sexual Activity    Alcohol use: Yes     Alcohol/week: 6.0 standard drinks of alcohol     Types: 6 Standard drinks or equivalent per week     Comment: occasional    Drug use: No    Sexual activity: Yes     Partners: Female     Comment:    Social History Narrative    Live with wife     Social Determinants of Health     Financial Resource Strain: Low Risk  (7/23/2020)    Overall Financial Resource Strain (CARDIA)     Difficulty of Paying Living Expenses: Not hard at all   Food Insecurity: No Food Insecurity (7/23/2020)    Hunger Vital Sign     Worried About Running Out of Food in the Last Year: Never true     Ran Out of Food in the Last Year: Never true   Transportation Needs: No Transportation Needs (7/23/2020)    PRAPARE - Transportation     Lack of Transportation (Medical): No     Lack of Transportation (Non-Medical): No   Physical Activity: Insufficiently Active (7/23/2020)    Exercise Vital Sign     Days of Exercise per Week: 3 days     Minutes of Exercise per Session: 30 min   Stress: No Stress Concern Present (5/28/2019)    [employer] - Occupational Stress Questionnaire     Feeling of Stress : Not at all   Social Connections: Unknown (7/23/2020)    Social Connection and Isolation Panel [NHANES]     Frequency of Communication with Friends and Family: More than three times a week     Frequency of Social Gatherings with Friends and Family: More than three times a week     Active Member of Clubs or Organizations: No      "Attends Club or Organization Meetings: Never     Marital Status:        Current Outpatient Medications   Medication Sig Dispense Refill    dutasteride (AVODART) 0.5 mg capsule TAKE 1 CAPSULE (0.5 MG TOTAL) BY MOUTH ONCE DAILY. 90 capsule 1    famotidine (PEPCID) 20 MG tablet Take 1 tablet (20 mg total) by mouth 2 (two) times daily. 180 tablet 1    fexofenadine (ALLEGRA) 180 MG tablet Take 1 tablet (180 mg total) by mouth once daily. 90 tablet 1    lisinopriL (PRINIVIL,ZESTRIL) 20 MG tablet TAKE 1 TABLET BY MOUTH TWICE A  tablet 1    montelukast (SINGULAIR) 10 mg tablet TAKE 1 TABLET BY MOUTH EVERY DAY IN THE EVENING 90 tablet 1    sildenafiL (VIAGRA) 50 MG tablet Take 1 tablet (50 mg total) by mouth once daily. 90 tablet 1    simvastatin (ZOCOR) 40 MG tablet TAKE 1 TABLET BY MOUTH EVERY DAY IN THE EVENING 90 tablet 1    tamsulosin (FLOMAX) 0.4 mg Cap TAKE 1 CAPSULE BY MOUTH EVERY DAY 90 capsule 1    vit C,W-Bb-dfrul-lutein-zeaxan (PRESERVISION AREDS-2) 250-90-40-1 mg Cap Take 1 capsule by mouth 2 (two) times a day.       aspirin (ECOTRIN) 81 MG EC tablet Take 1 tablet (81 mg total) by mouth once daily.  0    fluticasone propionate (FLONASE) 50 mcg/actuation nasal spray INHALE 1 SPRAY(S) IN BOTH NOSTRILS ONCE DAILY 48 mL 1    omega-3 acid ethyl esters (LOVAZA) 1 gram capsule Take 2 capsules (2 g total) by mouth 2 (two) times daily. 360 capsule 1     No current facility-administered medications for this visit.       Review of patient's allergies indicates:  No Known Allergies  Objective:    HPI     Hypertension     Additional comments: 5 months follow up          Last edited by Kyle Burkett MA on 8/2/2023 10:23 AM.      Blood pressure 120/78, pulse 74, temperature 98.2 °F (36.8 °C), temperature source Temporal, height 5' 5" (1.651 m), weight 102.1 kg (225 lb), SpO2 96 %. Body mass index is 37.44 kg/m².   Physical Exam  Vitals and nursing note reviewed.   Constitutional:       General: He is not in " acute distress.     Appearance: He is well-developed. He is obese. He is not ill-appearing, toxic-appearing or diaphoretic.   HENT:      Head: Normocephalic and atraumatic.   Eyes:      General: No scleral icterus.        Right eye: No discharge.         Left eye: No discharge.      Conjunctiva/sclera: Conjunctivae normal.   Neck:      Vascular: No carotid bruit.   Cardiovascular:      Rate and Rhythm: Normal rate and regular rhythm.      Heart sounds: Normal heart sounds. No murmur heard.  Pulmonary:      Effort: Pulmonary effort is normal. No respiratory distress.      Breath sounds: Normal breath sounds. No decreased breath sounds, wheezing, rhonchi or rales.   Abdominal:      General: There is no distension.      Palpations: Abdomen is soft.      Tenderness: There is no abdominal tenderness. There is no guarding or rebound.   Musculoskeletal:      Right lower leg: No edema.      Left lower leg: No edema.   Skin:     General: Skin is warm and dry.   Neurological:      Mental Status: He is alert.      Motor: No tremor.   Psychiatric:         Mood and Affect: Mood normal.         Speech: Speech normal.         Behavior: Behavior normal.             Assessment:       1. Essential hypertension    2. Mixed hyperlipidemia    3. Gastroesophageal reflux disease, unspecified whether esophagitis present    4. Allergic rhinitis, unspecified seasonality, unspecified trigger    5. Benign prostatic hyperplasia with nocturia    6. Prostate cancer screening        Plan:       Neo was seen today for hypertension.    Diagnoses and all orders for this visit:    Essential hypertension  -     Comprehensive Metabolic Panel; Future  -     Lipid Panel; Future  -     Urinalysis; Future    Mixed hyperlipidemia  -     omega-3 acid ethyl esters (LOVAZA) 1 gram capsule; Take 2 capsules (2 g total) by mouth 2 (two) times daily.    Gastroesophageal reflux disease, unspecified whether esophagitis present  Comments:  H2 blovkers have been  effective    Allergic rhinitis, unspecified seasonality, unspecified trigger  -     fluticasone propionate (FLONASE) 50 mcg/actuation nasal spray; INHALE 1 SPRAY(S) IN BOTH NOSTRILS ONCE DAILY    Benign prostatic hyperplasia with nocturia  -     Ambulatory referral/consult to Urology; Future  -     Urinalysis; Future  -     PSA, Screening; Future    Prostate cancer screening  -     PSA, Screening; Future

## 2023-08-03 ENCOUNTER — TELEPHONE (OUTPATIENT)
Dept: UROLOGY | Facility: CLINIC | Age: 58
End: 2023-08-03
Payer: COMMERCIAL

## 2023-08-03 NOTE — TELEPHONE ENCOUNTER
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

## 2023-08-04 ENCOUNTER — LAB VISIT (OUTPATIENT)
Dept: LAB | Facility: CLINIC | Age: 58
End: 2023-08-04
Payer: COMMERCIAL

## 2023-08-04 DIAGNOSIS — R35.1 BENIGN PROSTATIC HYPERPLASIA WITH NOCTURIA: ICD-10-CM

## 2023-08-04 DIAGNOSIS — Z12.5 PROSTATE CANCER SCREENING: ICD-10-CM

## 2023-08-04 DIAGNOSIS — N40.1 BENIGN PROSTATIC HYPERPLASIA WITH NOCTURIA: ICD-10-CM

## 2023-08-04 DIAGNOSIS — I10 ESSENTIAL HYPERTENSION: ICD-10-CM

## 2023-08-04 LAB
ALBUMIN SERPL BCP-MCNC: 4.3 G/DL (ref 3.5–5.2)
ALP SERPL-CCNC: 56 U/L (ref 55–135)
ALT SERPL W/O P-5'-P-CCNC: 52 U/L (ref 10–44)
ANION GAP SERPL CALC-SCNC: 9 MMOL/L (ref 8–16)
AST SERPL-CCNC: 29 U/L (ref 10–40)
BILIRUB SERPL-MCNC: 0.7 MG/DL (ref 0.1–1)
BILIRUB UR QL STRIP: NEGATIVE
BUN SERPL-MCNC: 21 MG/DL (ref 6–20)
CALCIUM SERPL-MCNC: 10.3 MG/DL (ref 8.7–10.5)
CHLORIDE SERPL-SCNC: 104 MMOL/L (ref 95–110)
CHOLEST SERPL-MCNC: 169 MG/DL (ref 120–199)
CHOLEST/HDLC SERPL: 4 {RATIO} (ref 2–5)
CLARITY UR: CLEAR
CO2 SERPL-SCNC: 27 MMOL/L (ref 23–29)
COLOR UR: YELLOW
COMPLEXED PSA SERPL-MCNC: 0.46 NG/ML (ref 0–4)
CREAT SERPL-MCNC: 1.3 MG/DL (ref 0.5–1.4)
EST. GFR  (NO RACE VARIABLE): >60 ML/MIN/1.73 M^2
GLUCOSE SERPL-MCNC: 100 MG/DL (ref 70–110)
GLUCOSE UR QL STRIP: NEGATIVE
HDLC SERPL-MCNC: 42 MG/DL (ref 40–75)
HDLC SERPL: 24.9 % (ref 20–50)
HGB UR QL STRIP: NEGATIVE
KETONES UR QL STRIP: NEGATIVE
LDLC SERPL CALC-MCNC: 91.4 MG/DL (ref 63–159)
LEUKOCYTE ESTERASE UR QL STRIP: NEGATIVE
NITRITE UR QL STRIP: NEGATIVE
NONHDLC SERPL-MCNC: 127 MG/DL
PH UR STRIP: 5 [PH] (ref 5–8)
POTASSIUM SERPL-SCNC: 5 MMOL/L (ref 3.5–5.1)
PROT SERPL-MCNC: 7.4 G/DL (ref 6–8.4)
PROT UR QL STRIP: NEGATIVE
SODIUM SERPL-SCNC: 140 MMOL/L (ref 136–145)
SP GR UR STRIP: 1.02 (ref 1–1.03)
TRIGL SERPL-MCNC: 178 MG/DL (ref 30–150)
URN SPEC COLLECT METH UR: NORMAL
UROBILINOGEN UR STRIP-ACNC: NEGATIVE EU/DL

## 2023-08-04 PROCEDURE — 84153 ASSAY OF PSA TOTAL: CPT | Performed by: INTERNAL MEDICINE

## 2023-08-04 PROCEDURE — 81003 URINALYSIS AUTO W/O SCOPE: CPT | Performed by: INTERNAL MEDICINE

## 2023-08-04 PROCEDURE — 80053 COMPREHEN METABOLIC PANEL: CPT | Performed by: INTERNAL MEDICINE

## 2023-08-04 PROCEDURE — 80061 LIPID PANEL: CPT | Performed by: INTERNAL MEDICINE

## 2023-08-28 ENCOUNTER — OFFICE VISIT (OUTPATIENT)
Dept: UROLOGY | Facility: CLINIC | Age: 58
End: 2023-08-28
Payer: COMMERCIAL

## 2023-08-28 VITALS
HEIGHT: 65 IN | BODY MASS INDEX: 35.82 KG/M2 | WEIGHT: 215 LBS | HEART RATE: 81 BPM | DIASTOLIC BLOOD PRESSURE: 85 MMHG | SYSTOLIC BLOOD PRESSURE: 134 MMHG

## 2023-08-28 DIAGNOSIS — R35.1 BENIGN PROSTATIC HYPERPLASIA WITH NOCTURIA: ICD-10-CM

## 2023-08-28 DIAGNOSIS — N13.8 BPH WITH OBSTRUCTION/LOWER URINARY TRACT SYMPTOMS: Primary | ICD-10-CM

## 2023-08-28 DIAGNOSIS — N40.1 BPH WITH OBSTRUCTION/LOWER URINARY TRACT SYMPTOMS: Primary | ICD-10-CM

## 2023-08-28 DIAGNOSIS — N40.1 BENIGN PROSTATIC HYPERPLASIA WITH NOCTURIA: ICD-10-CM

## 2023-08-28 LAB
BILIRUBIN, UA POC OHS: NEGATIVE
BLOOD, UA POC OHS: NEGATIVE
CLARITY, UA POC OHS: CLEAR
COLOR, UA POC OHS: YELLOW
GLUCOSE, UA POC OHS: NEGATIVE
KETONES, UA POC OHS: NEGATIVE
LEUKOCYTES, UA POC OHS: NEGATIVE
NITRITE, UA POC OHS: NEGATIVE
PH, UA POC OHS: 6.5
POC RESIDUAL URINE VOLUME: 87 ML (ref 0–100)
PROTEIN, UA POC OHS: NEGATIVE
SPECIFIC GRAVITY, UA POC OHS: 1.01
UROBILINOGEN, UA POC OHS: 0.2

## 2023-08-28 PROCEDURE — 1159F MED LIST DOCD IN RCRD: CPT | Mod: CPTII,S$GLB,, | Performed by: UROLOGY

## 2023-08-28 PROCEDURE — 1160F PR REVIEW ALL MEDS BY PRESCRIBER/CLIN PHARMACIST DOCUMENTED: ICD-10-PCS | Mod: CPTII,S$GLB,, | Performed by: UROLOGY

## 2023-08-28 PROCEDURE — 1159F PR MEDICATION LIST DOCUMENTED IN MEDICAL RECORD: ICD-10-PCS | Mod: CPTII,S$GLB,, | Performed by: UROLOGY

## 2023-08-28 PROCEDURE — 3079F PR MOST RECENT DIASTOLIC BLOOD PRESSURE 80-89 MM HG: ICD-10-PCS | Mod: CPTII,S$GLB,, | Performed by: UROLOGY

## 2023-08-28 PROCEDURE — 51798 POCT BLADDER SCAN: ICD-10-PCS | Mod: S$GLB,,, | Performed by: UROLOGY

## 2023-08-28 PROCEDURE — 4010F PR ACE/ARB THEARPY RXD/TAKEN: ICD-10-PCS | Mod: CPTII,S$GLB,, | Performed by: UROLOGY

## 2023-08-28 PROCEDURE — 3008F PR BODY MASS INDEX (BMI) DOCUMENTED: ICD-10-PCS | Mod: CPTII,S$GLB,, | Performed by: UROLOGY

## 2023-08-28 PROCEDURE — 1160F RVW MEDS BY RX/DR IN RCRD: CPT | Mod: CPTII,S$GLB,, | Performed by: UROLOGY

## 2023-08-28 PROCEDURE — 81003 URINALYSIS AUTO W/O SCOPE: CPT | Mod: QW,S$GLB,, | Performed by: UROLOGY

## 2023-08-28 PROCEDURE — 4010F ACE/ARB THERAPY RXD/TAKEN: CPT | Mod: CPTII,S$GLB,, | Performed by: UROLOGY

## 2023-08-28 PROCEDURE — 99205 OFFICE O/P NEW HI 60 MIN: CPT | Mod: S$GLB,,, | Performed by: UROLOGY

## 2023-08-28 PROCEDURE — 3075F SYST BP GE 130 - 139MM HG: CPT | Mod: CPTII,S$GLB,, | Performed by: UROLOGY

## 2023-08-28 PROCEDURE — 3008F BODY MASS INDEX DOCD: CPT | Mod: CPTII,S$GLB,, | Performed by: UROLOGY

## 2023-08-28 PROCEDURE — 99999 PR PBB SHADOW E&M-EST. PATIENT-LVL III: ICD-10-PCS | Mod: PBBFAC,,, | Performed by: UROLOGY

## 2023-08-28 PROCEDURE — 81003 POCT URINALYSIS(INSTRUMENT): ICD-10-PCS | Mod: QW,S$GLB,, | Performed by: UROLOGY

## 2023-08-28 PROCEDURE — 3079F DIAST BP 80-89 MM HG: CPT | Mod: CPTII,S$GLB,, | Performed by: UROLOGY

## 2023-08-28 PROCEDURE — 99205 PR OFFICE/OUTPT VISIT, NEW, LEVL V, 60-74 MIN: ICD-10-PCS | Mod: S$GLB,,, | Performed by: UROLOGY

## 2023-08-28 PROCEDURE — 99999 PR PBB SHADOW E&M-EST. PATIENT-LVL III: CPT | Mod: PBBFAC,,, | Performed by: UROLOGY

## 2023-08-28 PROCEDURE — 51798 US URINE CAPACITY MEASURE: CPT | Mod: S$GLB,,, | Performed by: UROLOGY

## 2023-08-28 PROCEDURE — 3075F PR MOST RECENT SYSTOLIC BLOOD PRESS GE 130-139MM HG: ICD-10-PCS | Mod: CPTII,S$GLB,, | Performed by: UROLOGY

## 2023-08-28 RX ORDER — TAMSULOSIN HYDROCHLORIDE 0.4 MG/1
0.8 CAPSULE ORAL NIGHTLY
Qty: 180 CAPSULE | Refills: 3 | Status: SHIPPED | OUTPATIENT
Start: 2023-08-28 | End: 2024-01-18

## 2023-08-28 NOTE — H&P (VIEW-ONLY)
[facility] Urology New Patient/H&P:     Neo Aguilar Jr. is a 58 y.o. male who presents for BPH evaluation at referral of Dr Arredondo    Last seen in routine pcp f/u 8/2/23 noting f/u of his HTN/HLD/GERD but also indicated:  Having more issues with urinary frequency, urgency, nocturia (2-3 times per night)  Sense of incomplete emptying (has to go back 10-15 minutes later).   He is interested in surgical options.     Ref Range  08/14/18 02/18/21 08/04/23    PSA, Screen 0.00 - 4.00   1.8 0.46   PSA - [facility] 0.0 - 4.0  1.0       8/4/23 labs also included a negative urinalysis, Cr 1.3, eGFR >60    AUA SS:  17/3 (4: Emptying, weak stream; 3: Frequency, intermittency, sleeping).  Medication helps 3/10  Patient reports he has been on 2 medications for his prostate for quite some time, and if he ever had benefit from them it was minimal, now symptoms progressing especially his urgency and frequency  Urgency has been progressive, with no concerns for urge incontinence, just a shorter interval from urge to void for time to make it to the bathroom.  Lately car trips have been more troubling, and also very recently notice standing and lying for rides at [employer] had to leave the line multiple times to use the restroom.  Urinalysis dipstick is negative.  Postvoid residual by bladder scan is 87 cc.  He notes that his sensation of incomplete emptying is largely due to the need to return back quickly to void again.  Some nights 1-2x, others 4-5x at worst depending on nightime fluid intake. If home and drinking beer is worse.   , 12 hr shifts. If lots of soda and tea on watch, once goes to bed will get.  Every morning needs double.  Does snore - has avoided VENESSA testing given job restrictions     On chart review he is taking Flomax and dutasteride.  In discussion of starting 1 and then it adding the other, he reports that initially he tried daily Cialis with no benefit, then change to Viagra for management of ED  component and started Flomax and then started dutasteride.  Chart review indicates Flomax was started in 2018 as below, and the dutasteride prescription I can see is part of the medical plan in February of 2021.    2018 pcp notes  started more than 1 year ago. The problem has been gradually worsening since onset. Irritative symptoms include nocturia (3-4 times per night) and urgency. Irritative symptoms do not include frequency. Obstructive symptoms include incomplete emptying, a slower stream and a weak stream. Obstructive symptoms do not include dribbling, an intermittent stream or straining. Pertinent negatives include no chills, dysuria, hematuria, hesitancy, nausea or vomiting. AUA score is 8-19. He is sexually active. The symptoms are aggravated by caffeine (tea). Treatments tried: saw palmetto    Father had prostate cancer. Not sure when diagnosed. Was treated and is 75 and noted to take meds for it and only came to light having recently been helping him w/ doctor visits    Past Medical History:   Diagnosis Date    Abnormal liver enzymes     Allergic rhinitis     Hyperlipidemia     Hypertension     Prostatitis        Past Surgical History:   Procedure Laterality Date    COLONOSCOPY  02/27/2018       Family History   Problem Relation Age of Onset    Coronary artery disease Father         >55    Parkinsonism Father     Cystic fibrosis Son        Social History     Socioeconomic History    Marital status:    Occupational History    Occupation: offshore    Tobacco Use    Smoking status: Former    Smokeless tobacco: Never   Substance and Sexual Activity    Alcohol use: Yes     Alcohol/week: 6.0 standard drinks of alcohol     Types: 6 Standard drinks or equivalent per week     Comment: occasional    Drug use: No    Sexual activity: Yes     Partners: Female     Comment:    Social History Narrative    Live with wife     Social Determinants of Health     Financial Resource Strain: Low Risk   "(7/23/2020)    Overall Financial Resource Strain (CARDIA)     Difficulty of Paying Living Expenses: Not hard at all   Food Insecurity: No Food Insecurity (7/23/2020)    Hunger Vital Sign     Worried About Running Out of Food in the Last Year: Never true     Ran Out of Food in the Last Year: Never true   Transportation Needs: No Transportation Needs (7/23/2020)    PRAPARE - Transportation     Lack of Transportation (Medical): No     Lack of Transportation (Non-Medical): No   Physical Activity: Insufficiently Active (7/23/2020)    Exercise Vital Sign     Days of Exercise per Week: 3 days     Minutes of Exercise per Session: 30 min   Stress: No Stress Concern Present (5/28/2019)    [employer] [place] [employer] - Occupational Stress Questionnaire     Feeling of Stress : Not at all   Social Connections: Unknown (7/23/2020)    Social Connection and Isolation Panel [NHANES]     Frequency of Communication with Friends and Family: More than three times a week     Frequency of Social Gatherings with Friends and Family: More than three times a week     Active Member of Clubs or Organizations: No     Attends Club or Organization Meetings: Never     Marital Status:        Review of patient's allergies indicates:  No Known Allergies    Medications Reviewed: see MAR    Focused Physical Exam    Vitals:    08/28/23 0852   BP: 134/85   Pulse: 81     Body mass index is 35.78 kg/m². Weight: 97.5 kg (215 lb) Height: 5' 5" (165.1 cm)       Abdomen: Soft, non-tender, nondistended, no CVA tenderness  :  deferred to time of cysto trus      LABS:    Recent Results (from the past 336 hour(s))   POCT Bladder Scan    Collection Time: 08/28/23  8:55 AM   Result Value Ref Range    POC Residual Urine Volume 87 0 - 100 mL   POCT Urinalysis(Instrument)    Collection Time: 08/28/23  9:00 AM   Result Value Ref Range    Color, POC UA Yellow Yellow, Straw, Colorless    Clarity, POC UA Clear Clear    Glucose, POC UA Negative Negative "    Bilirubin, POC UA Negative Negative    Ketones, POC UA Negative Negative    Spec Grav POC UA 1.015 1.005 - 1.030    Blood, POC UA Negative Negative    pH, POC UA 6.5 5.0 - 8.0    Protein, POC UA Negative Negative    Urobilinogen, POC UA 0.2 <=1.0    Nitrite, POC UA Negative Negative    WBC, POC UA Negative Negative         Assessment/Diagnosis:    1. BPH with obstruction/lower urinary tract symptoms  POCT Urinalysis(Instrument)    POCT Bladder Scan    Procedure Order to Urology      2. Benign prostatic hyperplasia with nocturia  Ambulatory referral/consult to Urology    tamsulosin (FLOMAX) 0.4 mg Cap      3. Benign prostatic hyperplasia with nocturia  Ambulatory referral/consult to Urology    tamsulosin (FLOMAX) 0.4 mg Cap    Will retry flomax (GI issues in past) and switch to viagra .  Discussed referral to discuss surgical options but wants to defer for now.           Plans:  Extensive review of medical record with primary care management of his BPH/LUTS over the years, having been on Flomax for at least 5 years, prior to which it is reported that he took over-the-counter supplementation, and in the last 2 years has added finasteride for dual medical therapy without much relief, and progression of irritative symptoms.  We did discuss the natural history of BPH with obstruction, as well as long-term obstruction yielding changes at the bladder level which increase urgency frequency which are mostly his bothersome symptoms at this time.  We discussed medical management and he is on dual medical therapy, however finasteride to shrink the prostate was started given his Flomax refractory symptoms in an unknown prostate size.  Has been on finasteride for at least 2 years, and this is reflected in his PSA, we noted the need to double for interpretation of which his adjusted PSA at this time of 0.92 is normal.    He is interested in further intervention for better relief of lower tract symptoms, we did discuss prior to  discussing true options for him, would be tailored to prostate size and anatomic considerations and does need lower tract workup to help guide further recommendations.  Procedures in detail diagnostic flexible cystourethroscopy including the use of local anesthesia with xylocaine jelly was described to the patient as well as concurrent transrectal ultrasound-guided volumetric measurement of the prostate to help guide further recommendations.  PATRICIA deferred to time of cysto/truss in the setting of normal PSA history.  As well after risk benefit discussion, will double the dose of his Flomax in the interim 4 weeks until further evaluation to see if this helps, but needs to do so in combination with conservative recommendations for urgency and frequency which we did review and were provided in writing, namely for him minimizing bladder irritants, especially in the afternoon and evening hours, and minimizing fluid intake 2 hours before bed.  We did discuss that some nocturia can be related to underlying undiagnosed sleep apnea, and certainly in the face of relief of prostate obstruction, if nocturia persisted, would recommend further evaluation time.    Cysto/Trus 9/26 asc    Total time spent in/on encounter today, including face to face time with patient, counseling, medical record review, interpretation of tests/results, , and treatment plan coordination: 60 minutes

## 2023-08-28 NOTE — PROGRESS NOTES
[facility] Urology New Patient/H&P:     Neo Aguilar Jr. is a 58 y.o. male who presents for BPH evaluation at referral of Dr Arredondo    Last seen in routine pcp f/u 8/2/23 noting f/u of his HTN/HLD/GERD but also indicated:  Having more issues with urinary frequency, urgency, nocturia (2-3 times per night)  Sense of incomplete emptying (has to go back 10-15 minutes later).   He is interested in surgical options.     Ref Range  08/14/18 02/18/21 08/04/23    PSA, Screen 0.00 - 4.00   1.8 0.46   PSA - [facility] 0.0 - 4.0  1.0       8/4/23 labs also included a negative urinalysis, Cr 1.3, eGFR >60    AUA SS:  17/3 (4: Emptying, weak stream; 3: Frequency, intermittency, sleeping).  Medication helps 3/10  Patient reports he has been on 2 medications for his prostate for quite some time, and if he ever had benefit from them it was minimal, now symptoms progressing especially his urgency and frequency  Urgency has been progressive, with no concerns for urge incontinence, just a shorter interval from urge to void for time to make it to the bathroom.  Lately car trips have been more troubling, and also very recently notice standing and lying for rides at "[employer] had to leave the line multiple times to use the restroom.  Urinalysis dipstick is negative.  Postvoid residual by bladder scan is 87 cc.  He notes that his sensation of incomplete emptying is largely due to the need to return back quickly to void again.  Some nights 1-2x, others 4-5x at worst depending on nightime fluid intake. If home and drinking beer is worse.   , 12 hr shifts. If lots of soda and tea on watch, once goes to bed will get.  Every morning needs double.  Does snore - has avoided VENESSA testing given job restrictions     On chart review he is taking Flomax and dutasteride.  In discussion of starting 1 and then it adding the other, he reports that initially he tried daily Cialis with no benefit, then change to Viagra for management of ED  component and started Flomax and then started dutasteride.  Chart review indicates Flomax was started in 2018 as below, and the dutasteride prescription I can see is part of the medical plan in February of 2021.    2018 pcp notes  started more than 1 year ago. The problem has been gradually worsening since onset. Irritative symptoms include nocturia (3-4 times per night) and urgency. Irritative symptoms do not include frequency. Obstructive symptoms include incomplete emptying, a slower stream and a weak stream. Obstructive symptoms do not include dribbling, an intermittent stream or straining. Pertinent negatives include no chills, dysuria, hematuria, hesitancy, nausea or vomiting. AUA score is 8-19. He is sexually active. The symptoms are aggravated by caffeine (tea). Treatments tried: saw palmetto    Father had prostate cancer. Not sure when diagnosed. Was treated and is 75 and noted to take meds for it and only came to light having recently been helping him w/ doctor visits    Past Medical History:   Diagnosis Date    Abnormal liver enzymes     Allergic rhinitis     Hyperlipidemia     Hypertension     Prostatitis        Past Surgical History:   Procedure Laterality Date    COLONOSCOPY  02/27/2018       Family History   Problem Relation Age of Onset    Coronary artery disease Father         >55    Parkinsonism Father     Cystic fibrosis Son        Social History     Socioeconomic History    Marital status:    Occupational History    Occupation: offshore    Tobacco Use    Smoking status: Former    Smokeless tobacco: Never   Substance and Sexual Activity    Alcohol use: Yes     Alcohol/week: 6.0 standard drinks of alcohol     Types: 6 Standard drinks or equivalent per week     Comment: occasional    Drug use: No    Sexual activity: Yes     Partners: Female     Comment:    Social History Narrative    Live with wife     Social Determinants of Health     Financial Resource Strain: Low Risk   "(7/23/2020)    Overall Financial Resource Strain (CARDIA)     Difficulty of Paying Living Expenses: Not hard at all   Food Insecurity: No Food Insecurity (7/23/2020)    Hunger Vital Sign     Worried About Running Out of Food in the Last Year: Never true     Ran Out of Food in the Last Year: Never true   Transportation Needs: No Transportation Needs (7/23/2020)    PRAPARE - Transportation     Lack of Transportation (Medical): No     Lack of Transportation (Non-Medical): No   Physical Activity: Insufficiently Active (7/23/2020)    Exercise Vital Sign     Days of Exercise per Week: 3 days     Minutes of Exercise per Session: 30 min   Stress: No Stress Concern Present (5/28/2019)    [employer] [place] [employer] - Occupational Stress Questionnaire     Feeling of Stress : Not at all   Social Connections: Unknown (7/23/2020)    Social Connection and Isolation Panel [NHANES]     Frequency of Communication with Friends and Family: More than three times a week     Frequency of Social Gatherings with Friends and Family: More than three times a week     Active Member of Clubs or Organizations: No     Attends Club or Organization Meetings: Never     Marital Status:        Review of patient's allergies indicates:  No Known Allergies    Medications Reviewed: see MAR    Focused Physical Exam    Vitals:    08/28/23 0852   BP: 134/85   Pulse: 81     Body mass index is 35.78 kg/m². Weight: 97.5 kg (215 lb) Height: 5' 5" (165.1 cm)       Abdomen: Soft, non-tender, nondistended, no CVA tenderness  :  deferred to time of cysto trus      LABS:    Recent Results (from the past 336 hour(s))   POCT Bladder Scan    Collection Time: 08/28/23  8:55 AM   Result Value Ref Range    POC Residual Urine Volume 87 0 - 100 mL   POCT Urinalysis(Instrument)    Collection Time: 08/28/23  9:00 AM   Result Value Ref Range    Color, POC UA Yellow Yellow, Straw, Colorless    Clarity, POC UA Clear Clear    Glucose, POC UA Negative Negative "    Bilirubin, POC UA Negative Negative    Ketones, POC UA Negative Negative    Spec Grav POC UA 1.015 1.005 - 1.030    Blood, POC UA Negative Negative    pH, POC UA 6.5 5.0 - 8.0    Protein, POC UA Negative Negative    Urobilinogen, POC UA 0.2 <=1.0    Nitrite, POC UA Negative Negative    WBC, POC UA Negative Negative         Assessment/Diagnosis:    1. BPH with obstruction/lower urinary tract symptoms  POCT Urinalysis(Instrument)    POCT Bladder Scan    Procedure Order to Urology      2. Benign prostatic hyperplasia with nocturia  Ambulatory referral/consult to Urology    tamsulosin (FLOMAX) 0.4 mg Cap      3. Benign prostatic hyperplasia with nocturia  Ambulatory referral/consult to Urology    tamsulosin (FLOMAX) 0.4 mg Cap    Will retry flomax (GI issues in past) and switch to viagra .  Discussed referral to discuss surgical options but wants to defer for now.           Plans:  Extensive review of medical record with primary care management of his BPH/LUTS over the years, having been on Flomax for at least 5 years, prior to which it is reported that he took over-the-counter supplementation, and in the last 2 years has added finasteride for dual medical therapy without much relief, and progression of irritative symptoms.  We did discuss the natural history of BPH with obstruction, as well as long-term obstruction yielding changes at the bladder level which increase urgency frequency which are mostly his bothersome symptoms at this time.  We discussed medical management and he is on dual medical therapy, however finasteride to shrink the prostate was started given his Flomax refractory symptoms in an unknown prostate size.  Has been on finasteride for at least 2 years, and this is reflected in his PSA, we noted the need to double for interpretation of which his adjusted PSA at this time of 0.92 is normal.    He is interested in further intervention for better relief of lower tract symptoms, we did discuss prior to  discussing true options for him, would be tailored to prostate size and anatomic considerations and does need lower tract workup to help guide further recommendations.  Procedures in detail diagnostic flexible cystourethroscopy including the use of local anesthesia with xylocaine jelly was described to the patient as well as concurrent transrectal ultrasound-guided volumetric measurement of the prostate to help guide further recommendations.  PATRICIA deferred to time of cysto/truss in the setting of normal PSA history.  As well after risk benefit discussion, will double the dose of his Flomax in the interim 4 weeks until further evaluation to see if this helps, but needs to do so in combination with conservative recommendations for urgency and frequency which we did review and were provided in writing, namely for him minimizing bladder irritants, especially in the afternoon and evening hours, and minimizing fluid intake 2 hours before bed.  We did discuss that some nocturia can be related to underlying undiagnosed sleep apnea, and certainly in the face of relief of prostate obstruction, if nocturia persisted, would recommend further evaluation time.    Cysto/Trus 9/26 asc    Total time spent in/on encounter today, including face to face time with patient, counseling, medical record review, interpretation of tests/results, , and treatment plan coordination: 60 minutes

## 2023-08-28 NOTE — PROGRESS NOTES
Procedure Order to Urology [[id number]]    Electronically signed by: Dariusz Dietz MD on 08/28/23 0940 Status: Active   Ordering user: Dariusz Dietz MD 08/28/23 0940 Authorized by: Dariusz Dietz MD   Ordering mode: Standard   Frequency:  08/28/23 -     Diagnoses   BPH with obstruction/lower urinary tract symptoms [N40.1, N13.8]   Questionnaire    Question Answer   Procedure Cystoscopy Comment - 9/26   TRUS/Trans Rectal Ultrasound   Facility Name: [facility]   [facility], Please order Urine POCT without micro . Local sedation (no urine Dr Jacques or Dr fletcher) /trus

## 2023-08-28 NOTE — PATIENT INSTRUCTIONS
Discussed conservative measures to control urgency and frequency including   1. Avoiding/minimizing bladder irritants (see below), especially in afternoon and evening hours    Discussed bladder irritants include coffe (even decaf), tea, alcohol, soda, spicy foods, acidic juices (orange, tomato), vinegar, and artificial sweeteners/sugary beverages.    2. timed voiding - empty on a schedule (approx ~2-3 hours) in spite of need to urinate, to get ahead of urge    3. dont postponing voiding - dont hold it on purpose     4. bowel regimen as distended bowel has extrinsic compressive effect on bladder.   - any or all of the following in any combination, titrate to soft daily bowel movement without pushing or straining  - colace/stool softener capsule - once to twice daily  - miralax - 1 capful daily to start, can increase to 2x daily (or decrease to 1/2 cap daily)  - increase dietary fibery  - fiber supplements, such as metamucil  - prunes, prune juice    5. INCREASE water intake    6. Stop fluids 2 hours before bed, and urinate just before bed    7. 2 flomax/tamsulosin nightly (aka before sleep) in interim until    8. Cysto/trus 9/26/23

## 2023-09-21 NOTE — DISCHARGE INSTRUCTIONS
Cystoscopy    Cystoscopy is a procedure that lets your doctor look directly inside your urethra and bladder. It can be used to:  Help diagnose a problem with your urethra, bladder, or kidneys.  Take a sample (biopsy) of bladder or urethral tissue.  Treat certain problems (such as removing kidney stones).  Place a stent to bypass an obstruction.  Take special X-rays of the kidneys.  Based on the findings, your doctor may recommend other tests or treatments.  What is a cystoscope?  A cystoscope is a telescope-like instrument that contains lenses and fiberoptics (small glass wires that make bright light). The cystoscope may be straight and rigid, or flexible to bend around curves in the urethra. The doctor may look directly into the cystoscope, or project the image onto a monitor.  Getting ready  Ask your doctor if you should stop taking any medicines before the procedure.  Ask whether you should avoid eating or drinking anything after midnight before the procedure.  Follow any other instructions your doctor gives you.  Tell your doctor before the exam if you:  Take any medicines, such as aspirin or blood thinners  Have allergies to any medicines  Are pregnant   The procedure  Cystoscopy is done in the doctors office, surgery center, or hospital. The doctor and a nurse are present during the procedure. It takes only a few minutes, longer if a biopsy, X-ray, or treatment needs to be done.  During the procedure:  You lie on an exam table on your back, knees bent and legs apart. You are covered with a drape.  Your urethra and the area around it are washed. Anesthetic jelly may be applied to numb the urethra. Other pain medicine is usually not needed. In some cases, you may be offered a mild sedative to help you relax. If a more extensive procedure is to be done, such as a biopsy or kidney stone removal, general anesthesia may be needed.  The cystoscope is inserted. A sterile fluid is put into the bladder to expand it.  You may feel pressure from this fluid.  When the procedure is done, the cystoscope is removed.  After the procedure  If you had a sedative, general anesthesia, or spinal anesthesia, you must have someone drive you home. Once youre home:  Drink plenty of fluids.  You may have burning or light bleeding when you urinate--this is normal.  Medicines may be prescribed to ease any discomfort or prevent infection. Take these as directed.  Call your doctor if you have heavy bleeding or blood clots, burning that lasts more than a day, a fever over 100°F  (38° C), or trouble urinating.    After Surgery:  Always be aware that any surgery can cause these symptoms:    Pain- Medication can be prescribed for pain to decrease your pain but may not completely take your pain away.  Over the Counter pain medicine my be enough and you can always use Ice and rest to help ease pain.    Bleeding- a little bleeding after a surgery is usually within normal.  If there is a lot of blood you need to notify your MD.  Emergency treatments of bleeding are cold application, elevation of the bleeding site and compression.    Infection- Infection after surgery is NOT a normal occurrence.  Signs of infection are fever, swelling, hot to touch the incision.  If this occurs notify your MD immediately.    Nausea- this can be common after a surgery especially if you have had anesthesia medicine or are taking pain medicine.  Staying on clear liquids, bland foods, gingerale, or over the counter anti nausea medicines can help.  If you vomit more than once, notify your MD.  Anti Nausea medicines can be prescribed.     Transrectal Ultrasound   A transrectal ultrasound is an imaging test. It uses sound waves to create pictures of a mans prostate gland to determine its size.Your prostate gland is in front of your rectum and if too large may become inflamed and impede the flow of urine. For this test, a special probe (transducer) is placed directly into your  rectum.     Before leaving, you may need to wait for a short time while the images are reviewed. In most cases, you can go back to your normal routine after the test.       © 5114-8832 [employer]. [address] [place] [address], [place] [address]. All rights reserved. This information is not intended as a substitute for professional medical care. Always follow your healthcare professional's instructions.

## 2023-09-26 ENCOUNTER — HOSPITAL ENCOUNTER (OUTPATIENT)
Facility: HOSPITAL | Age: 58
Discharge: HOME | End: 2023-09-26
Attending: UROLOGY | Admitting: UROLOGY
Payer: COMMERCIAL

## 2023-09-26 DIAGNOSIS — N40.1 BPH WITH OBSTRUCTION/LOWER URINARY TRACT SYMPTOMS: Primary | ICD-10-CM

## 2023-09-26 DIAGNOSIS — N13.8 BPH WITH OBSTRUCTION/LOWER URINARY TRACT SYMPTOMS: Primary | ICD-10-CM

## 2023-09-26 DIAGNOSIS — N13.8 BPH WITH OBSTRUCTION/LOWER URINARY TRACT SYMPTOMS: ICD-10-CM

## 2023-09-26 DIAGNOSIS — R58 BLEEDING: ICD-10-CM

## 2023-09-26 DIAGNOSIS — N40.1 BPH WITH OBSTRUCTION/LOWER URINARY TRACT SYMPTOMS: ICD-10-CM

## 2023-09-26 LAB
BILIRUBIN, UA POC OHS: NEGATIVE
BLOOD, UA POC OHS: NEGATIVE
CLARITY, UA POC OHS: CLEAR
COLOR, UA POC OHS: YELLOW
GLUCOSE, UA POC OHS: NEGATIVE
KETONES, UA POC OHS: NEGATIVE
LEUKOCYTES, UA POC OHS: NEGATIVE
NITRITE, UA POC OHS: NEGATIVE
PH, UA POC OHS: 5.5
PROTEIN, UA POC OHS: NEGATIVE
SPECIFIC GRAVITY, UA POC OHS: 1.02
UROBILINOGEN, UA POC OHS: 0.2

## 2023-09-26 PROCEDURE — 52000 CYSTOURETHROSCOPY: CPT | Mod: ,,, | Performed by: UROLOGY

## 2023-09-26 PROCEDURE — 76872 US TRANSRECTAL: CPT | Performed by: UROLOGY

## 2023-09-26 PROCEDURE — 76872 PR US TRANSRECTAL: ICD-10-PCS | Mod: 26,,, | Performed by: UROLOGY

## 2023-09-26 PROCEDURE — 25000003 PHARM REV CODE 250: Performed by: UROLOGY

## 2023-09-26 PROCEDURE — 76872 US TRANSRECTAL: CPT | Mod: 26,,, | Performed by: UROLOGY

## 2023-09-26 PROCEDURE — 52000 CYSTOURETHROSCOPY: CPT | Performed by: UROLOGY

## 2023-09-26 PROCEDURE — A4217 STERILE WATER/SALINE, 500 ML: HCPCS | Performed by: UROLOGY

## 2023-09-26 PROCEDURE — 52000 PR CYSTOURETHROSCOPY: ICD-10-PCS | Mod: ,,, | Performed by: UROLOGY

## 2023-09-26 RX ORDER — WATER 1 ML/ML
IRRIGANT IRRIGATION
Status: DISCONTINUED | OUTPATIENT
Start: 2023-09-26 | End: 2023-09-26 | Stop reason: HOSPADM

## 2023-09-26 RX ORDER — LIDOCAINE HYDROCHLORIDE 20 MG/ML
JELLY TOPICAL
Status: DISCONTINUED | OUTPATIENT
Start: 2023-09-26 | End: 2023-09-26 | Stop reason: HOSPADM

## 2023-09-26 RX ORDER — CIPROFLOXACIN 500 MG/1
500 TABLET ORAL 2 TIMES DAILY
Qty: 4 TABLET | Refills: 0 | Status: SHIPPED | OUTPATIENT
Start: 2023-09-26 | End: 2023-09-28

## 2023-09-26 NOTE — PROGRESS NOTES
Procedure Order to Urology [[id number]]    Electronically signed by: Dariusz Dietz MD on 09/26/23 1528 Status: Active   Ordering user: Dariusz Dietz MD 09/26/23 1528 Ordering provider: Dariusz Dietz MD   Authorized by: Dariusz Dietz MD Ordering mode: Standard   Frequency:  09/26/23 -     Diagnoses   BPH with obstruction/lower urinary tract symptoms [N40.1, N13.8]   Questionnaire    Question Answer   Procedure Urolift Comment - 12/21   Facility Name: [facility], please order, CBC, BMP, PT/ INR ,U/A and culture ,EKG if over 40  IV start, NPO, general anesthesia, Ancef 2 gram ( alternative for pcn allergy is Cipro 400mg IV ) [id number]

## 2023-09-26 NOTE — INTERVAL H&P NOTE
The patient has been examined and the H&P has been reviewed:    No change      There are no hospital problems to display for this patient.

## 2023-09-27 VITALS
SYSTOLIC BLOOD PRESSURE: 148 MMHG | OXYGEN SATURATION: 100 % | DIASTOLIC BLOOD PRESSURE: 93 MMHG | RESPIRATION RATE: 20 BRPM | TEMPERATURE: 99 F | HEART RATE: 72 BPM | BODY MASS INDEX: 35.82 KG/M2 | HEIGHT: 65 IN | WEIGHT: 215 LBS

## 2023-09-27 NOTE — OP NOTE
[facility] Urology Operative/Brief Discharge Note     Date: 9/26/23     Staff Surgeon: Dariusz Dietz MD     Pre-Op Diagnosis:   BPH with LUTS     Post-Op Diagnosis:   same     Procedure(s) Performed:   Cystoscopy, flexible  Transrectal ultrasound with volumetric measurement of prostate     Specimen(s): none     Anesthesia: local, 2% xylocaine jelly urojet     Findings:   TRUS: Volume 52.32 cm3 (W 52.57mm, H 33.47 mm, L 56.86mm), no med lobe, moderate PVR   Cysto:  trilobar obstruction without true median lobe or intravesical extension as elevation of bladder neck and floor of prostate/posterior bladder neck rolling in to outlet as posteriir/inferior partial obstruction with lateral lobe obstruction distal to it, more anteriolateral proximally and classic kissing lateral lobe distally, espeically with water off, and moderate diffuse trabeculations with intrevening early cellules      Estimated Blood Loss: none     Drains: none     Complications: none     Indications for procedure:  57yo M with history of progressive BPH/LUTS, on dual medical therapy with flomax and dutasteride recently with addition of another flomax without benefit. Presents today for repeat lower tract evaluation.     Procedure in detail:  After informed consent, the patient was placed in left lateral decubitus position. Transrectal ultrasound probe was passed into the rectum and the prostate was visualized on the screen.  Three-dimensional measurements taken as above with reported prostate volume as documented.  Pertinent ultrasound findings noted above, with absence of median lobe, and No ultrasonic abnormalities of prostate were visualized. Transverse and saggital image captured.  The ultrasound probe was removed     He was then placed in supine position and prepped and drapped in standard cystoscopic fashion and 2% xylocaine jelly was instilled into the urethra.  A flexible cystoscope was passed into the bladder via the urethra.      Anterior  urethra normal without narrowing. The prostatic urethra demonstrated significant obstruction as described above in findings, with lateral lobe obstruction present with the absence of median lobe, as confirmed on retroflexion      Bladder otherwise systematically inspected and no mucosal lesions or tumors seen.  Bladder was also assessed for signs of chronic obstruction such as trabeculations, cellules, diverticulum, of which pertinent findings are noted above with extensive signs of chronic obstruction.  Bilateral ureteral orifices seen in orthotopic position on trigone bilaterally with clear efflux.       Patient tolerated the procedure well. No complications     Disposition:  He does have moderate obstructive lower urinary tract symptoms despite dual medical therapy and refractory to increasing dose of alpha blockers, and symptoms persist and has significant obstruction and therefore discussed options for intervention for his medication refractory LUTS. Discussed options for intervention such as urolift, rezum, tuip, turp. He is interested in proceeding with Urolift after discussion of all risks, benefits, and alternatives. Especially given minimal risk profile and return to activity. No true median lobe or intravesical extension and therefore good candidate, especially after risk benefit discussion of urolift vs turp extensively     Procedure in detail discussed. Discussed risks including but not limited to hematuria, postop retention, pain, infection, injury to bladder or urethra, and worsening urgency, latent pelvic pain, no guarantee of symptomatic relief, prostate regrowth, need for future procedures. We did discuss the non-incidence of ejaculatory dysfunction, as well as the 1-2% retreatment rate in 5 year studies. Also discussed about 20% of people may need a catheter after (due to retention or hematuria) but not required if voiding postop, though tend to leave adams prophylactically overnight, which he is  agreeable to.     Discussed that symptomatic relief may take longer to be fully appreciated, in the setting of longer standing obstruction, and period of symptomatic adjustment postop. Also discussed transient increases in urgency frequency which may yield transient UUI in postop period.  Discussed risk of finasteride rebound growth when stopping it postop and will treat with vertical stacking     All questions patient had answered and appropriate informed consent obtained.  Urolift in OR 12/2123 per pt preference         Discharge home today status post uncomplicated procedure as above  Diet - resume home diet  Follow up: urolift 12/21/23  Instructions:  Drink plenty of water, may see blood in urine, complete prophylactic antibiotics.  Hold asa/fish oil/bloodthinners 1 week prior to urolift  Meds:     Medication List        START taking these medications      ciprofloxacin HCl 500 MG tablet  Commonly known as: CIPRO  Take 1 tablet (500 mg total) by mouth 2 (two) times a day. for 2 days            CONTINUE taking these medications      aspirin 81 MG EC tablet  Commonly known as: ECOTRIN  Take 1 tablet (81 mg total) by mouth once daily.     dutasteride 0.5 mg capsule  Commonly known as: AVODART  TAKE 1 CAPSULE (0.5 MG TOTAL) BY MOUTH ONCE DAILY.     famotidine 20 MG tablet  Commonly known as: PEPCID  Take 1 tablet (20 mg total) by mouth 2 (two) times daily.     fexofenadine 180 MG tablet  Commonly known as: ALLEGRA  Take 1 tablet (180 mg total) by mouth once daily.     fluticasone propionate 50 mcg/actuation nasal spray  Commonly known as: FLONASE  INHALE 1 SPRAY(S) IN BOTH NOSTRILS ONCE DAILY     lisinopriL 20 MG tablet  Commonly known as: PRINIVIL,ZESTRIL  TAKE 1 TABLET BY MOUTH TWICE A DAY     montelukast 10 mg tablet  Commonly known as: SINGULAIR  TAKE 1 TABLET BY MOUTH EVERY DAY IN THE EVENING     omega-3 acid ethyl esters 1 gram capsule  Commonly known as: LOVAZA  Take 2 capsules (2 g total) by mouth 2 (two)  times daily.     PRESERVISION AREDS-2 250-90-40-1 mg Cap  Generic drug: vit C,U-Me-galox-lutein-zeaxan     sildenafiL 50 MG tablet  Commonly known as: VIAGRA  Take 1 tablet (50 mg total) by mouth once daily.     simvastatin 40 MG tablet  Commonly known as: ZOCOR  TAKE 1 TABLET BY MOUTH EVERY DAY IN THE EVENING     tamsulosin 0.4 mg Cap  Commonly known as: FLOMAX  Take 2 capsules (0.8 mg total) by mouth every evening.               Where to Get Your Medications        These medications were sent to [facility]/pharmacy #[id number] - [place], [place] - [address] AT [facility]  [address], [place] [address]      Phone: [phone]   ciprofloxacin HCl 500 MG tablet

## 2023-11-14 DIAGNOSIS — E78.2 MIXED HYPERLIPIDEMIA: ICD-10-CM

## 2023-11-14 DIAGNOSIS — I10 ESSENTIAL HYPERTENSION: ICD-10-CM

## 2023-11-14 RX ORDER — LISINOPRIL 20 MG/1
TABLET ORAL
Qty: 180 TABLET | Refills: 1 | Status: SHIPPED | OUTPATIENT
Start: 2023-11-14 | End: 2024-02-15

## 2023-11-14 RX ORDER — SIMVASTATIN 40 MG/1
TABLET, FILM COATED ORAL
Qty: 90 TABLET | Refills: 1 | Status: SHIPPED | OUTPATIENT
Start: 2023-11-14

## 2023-12-15 ENCOUNTER — HOSPITAL ENCOUNTER (OUTPATIENT)
Dept: PREADMISSION TESTING | Facility: HOSPITAL | Age: 58
Discharge: HOME | End: 2023-12-15
Attending: UROLOGY
Payer: COMMERCIAL

## 2023-12-15 VITALS — HEIGHT: 65 IN | WEIGHT: 220 LBS | BODY MASS INDEX: 36.65 KG/M2

## 2023-12-15 DIAGNOSIS — Z01.810 PREOP CARDIOVASCULAR EXAM: ICD-10-CM

## 2023-12-15 DIAGNOSIS — N13.8 BPH WITH OBSTRUCTION/LOWER URINARY TRACT SYMPTOMS: ICD-10-CM

## 2023-12-15 DIAGNOSIS — N40.1 BPH WITH OBSTRUCTION/LOWER URINARY TRACT SYMPTOMS: ICD-10-CM

## 2023-12-15 PROCEDURE — 93010 ELECTROCARDIOGRAM REPORT: CPT | Mod: ,,, | Performed by: GENERAL PRACTICE

## 2023-12-15 PROCEDURE — 93010 EKG 12-LEAD: ICD-10-PCS | Mod: ,,, | Performed by: GENERAL PRACTICE

## 2023-12-15 PROCEDURE — 93005 ELECTROCARDIOGRAM TRACING: CPT

## 2023-12-15 RX ORDER — IBUPROFEN 200 MG
200 TABLET ORAL EVERY 6 HOURS PRN
COMMUNITY

## 2023-12-15 NOTE — DISCHARGE INSTRUCTIONS
To confirm, Your doctor has instructed you that surgery is scheduled for:     Please report to [facility], Registration the morning of surgery. You must check-in and receive a wristband before going to your procedure.  [address] [place] [address] [place] [id number]    Pre-Op will call the afternoon prior to surgery between 1:00 and 6:00 PM with the final arrival time.  Phone number: [phone]    PLEASE NOTE:  The surgery schedule has many variables which may affect the time of your surgery case.  Family members should be available if your surgery time changes.  Plan to be here the day of your procedure between 4-6 hours.    MEDICATIONS:   TAKE ONLY THESE MEDICATIONS WITH A SMALL SIP OF WATER THE MORNING OF YOUR PROCEDURE:       DO NOT TAKE THESE MEDICATIONS 5-7 DAYS PRIOR to your procedure or per your surgeon's request: Aspirin, Ibuprofen, Vitamins, Omega 3.  ASPIRIN, ALEVE, ADVIL, IBUPROFEN, FISH OIL VITAMIN E, HERBALS  (May take Tylenol)    ONLY if you are prescribed any types of blood thinners such as:  Aspirin, Coumadin, Plavix, Pradaxa, Xarelto, Aggrenox, Effient, Eliquis, Savasya, Brilinta, or any other, ask your surgeon whether you should stop taking them and how long before surgery you should stop.  You may also need to verify with the prescribing physician if it is ok to stop your medication.      INSTRUCTIONS IMPORTANT!!  Do not eat or drink anything between midnight and the time of your procedure- this includes gum, mints, and candy.  Do not smoke or drink alcoholic beverages 24 hours prior to your procedure.  Shower the night before AND the morning of your procedure with a Chlorhexidine wash such as Hibiclens or Dial antibacterial soap from the neck down.  Do not get it on your face or in your eyes.  You may use your own shampoo and face wash. This helps your skin to be as bacteria free as possible.    If you wear contact lenses, dentures, hearing aids or glasses, bring a container to  put them in during surgery and give to a family member for safe keeping.  Please leave all jewelry, piercing's and valuables at home. You must remove your false eyelashes prior to surgery.    DO NOT remove hair from the surgery site.  Do not shave the incision site unless you are given specific instructions to do so.    ONLY if you have been diagnosed with sleep apnea please bring your C-PAP machine.  ONLY if you wear home oxygen please bring your portable oxygen tank the day of your procedure.  ONLY if you have a history of OPEN HEART SURGERY you will need a clearance from your Cardiologist per Anesthesia.      ONLY for patients requiring bowel prep, written instructions will be given by your doctor's office.  ONLY if you have a neuro stimulator, please bring the controller with you the morning of surgery  ONLY if a type and screen test is needed before surgery, please return:  If your doctor has scheduled you for an overnight stay, bring a small overnight bag with any personal items you need.  Make arrangements in advance for transportation home by a responsible adult.  It is not safe to drive a vehicle during the 24 hours after anesthesia.          All  facilities and properties are tobacco free.  Smoking is NOT allowed.   If you have any questions about these instructions, call Pre-Op Admit  Nursing at [phone] or the Pre-Op Day Surgery Unit at [phone].

## 2023-12-20 ENCOUNTER — ANESTHESIA EVENT (OUTPATIENT)
Dept: SURGERY | Facility: HOSPITAL | Age: 58
End: 2023-12-20
Payer: COMMERCIAL

## 2023-12-21 ENCOUNTER — HOSPITAL ENCOUNTER (OUTPATIENT)
Facility: HOSPITAL | Age: 58
Discharge: HOME | End: 2023-12-21
Attending: UROLOGY | Admitting: UROLOGY
Payer: COMMERCIAL

## 2023-12-21 ENCOUNTER — ANESTHESIA (OUTPATIENT)
Dept: SURGERY | Facility: HOSPITAL | Age: 58
End: 2023-12-21
Payer: COMMERCIAL

## 2023-12-21 DIAGNOSIS — N13.8 BPH WITH OBSTRUCTION/LOWER URINARY TRACT SYMPTOMS: ICD-10-CM

## 2023-12-21 DIAGNOSIS — N40.1 BPH WITH OBSTRUCTION/LOWER URINARY TRACT SYMPTOMS: ICD-10-CM

## 2023-12-21 PROCEDURE — 63600175 PHARM REV CODE 636 W HCPCS: Performed by: UROLOGY

## 2023-12-21 PROCEDURE — 63600175 PHARM REV CODE 636 W HCPCS: Performed by: NURSE ANESTHETIST, CERTIFIED REGISTERED

## 2023-12-21 PROCEDURE — 71000033 HC RECOVERY, INTIAL HOUR: Performed by: UROLOGY

## 2023-12-21 PROCEDURE — D9220A PRA ANESTHESIA: ICD-10-PCS | Mod: ANES,,, | Performed by: ANESTHESIOLOGY

## 2023-12-21 PROCEDURE — 27200651 HC AIRWAY, LMA: Performed by: ANESTHESIOLOGY

## 2023-12-21 PROCEDURE — 25000003 PHARM REV CODE 250: Performed by: ANESTHESIOLOGY

## 2023-12-21 PROCEDURE — 37000009 HC ANESTHESIA EA ADD 15 MINS: Performed by: UROLOGY

## 2023-12-21 PROCEDURE — L8699 PROSTHETIC IMPLANT NOS: HCPCS | Performed by: UROLOGY

## 2023-12-21 PROCEDURE — C1758 CATHETER, URETERAL: HCPCS | Performed by: UROLOGY

## 2023-12-21 PROCEDURE — 71000016 HC POSTOP RECOV ADDL HR: Performed by: UROLOGY

## 2023-12-21 PROCEDURE — 94799 UNLISTED PULMONARY SVC/PX: CPT

## 2023-12-21 PROCEDURE — 52441 CYSTO INSJ TRNSPRSTC 1 IMPLT: CPT | Mod: 22,,, | Performed by: UROLOGY

## 2023-12-21 PROCEDURE — 25000003 PHARM REV CODE 250: Performed by: NURSE ANESTHETIST, CERTIFIED REGISTERED

## 2023-12-21 PROCEDURE — 63600175 PHARM REV CODE 636 W HCPCS: Performed by: ANESTHESIOLOGY

## 2023-12-21 PROCEDURE — 37000008 HC ANESTHESIA 1ST 15 MINUTES: Performed by: UROLOGY

## 2023-12-21 PROCEDURE — D9220A PRA ANESTHESIA: Mod: CRNA,,, | Performed by: NURSE ANESTHETIST, CERTIFIED REGISTERED

## 2023-12-21 PROCEDURE — 36000707: Performed by: UROLOGY

## 2023-12-21 PROCEDURE — 52442 CYSTO INS TRNSPRSTC IMPLT EA: CPT | Mod: ,,, | Performed by: UROLOGY

## 2023-12-21 PROCEDURE — 52441 PR CYSTO W/INSERT PERMANENT ADJ IMPLANT, SINGLE: ICD-10-PCS | Mod: 22,,, | Performed by: UROLOGY

## 2023-12-21 PROCEDURE — D9220A PRA ANESTHESIA: ICD-10-PCS | Mod: CRNA,,, | Performed by: NURSE ANESTHETIST, CERTIFIED REGISTERED

## 2023-12-21 PROCEDURE — 27201423 OPTIME MED/SURG SUP & DEVICES STERILE SUPPLY: Performed by: UROLOGY

## 2023-12-21 PROCEDURE — C1729 CATH, DRAINAGE: HCPCS | Performed by: UROLOGY

## 2023-12-21 PROCEDURE — D9220A PRA ANESTHESIA: Mod: ANES,,, | Performed by: ANESTHESIOLOGY

## 2023-12-21 PROCEDURE — 71000039 HC RECOVERY, EACH ADD'L HOUR: Performed by: UROLOGY

## 2023-12-21 PROCEDURE — 71000015 HC POSTOP RECOV 1ST HR: Performed by: UROLOGY

## 2023-12-21 PROCEDURE — 36000706: Performed by: UROLOGY

## 2023-12-21 PROCEDURE — 52442 PR CYSTO W/INSERT PERMANENT ADJ IMPLANT, EA ADDTL IMPLANT: ICD-10-PCS | Mod: ,,, | Performed by: UROLOGY

## 2023-12-21 DEVICE — KIT UROLIFT 2 CARTRIDGE HANDLE: Type: IMPLANTABLE DEVICE | Site: PROSTATE | Status: FUNCTIONAL

## 2023-12-21 DEVICE — CARTRIDGE UROLIFT 2 IMPLANT: Type: IMPLANTABLE DEVICE | Site: PROSTATE | Status: FUNCTIONAL

## 2023-12-21 RX ORDER — ONDANSETRON HYDROCHLORIDE 2 MG/ML
INJECTION, SOLUTION INTRAMUSCULAR; INTRAVENOUS
Status: DISCONTINUED | OUTPATIENT
Start: 2023-12-21 | End: 2023-12-21

## 2023-12-21 RX ORDER — SULFAMETHOXAZOLE AND TRIMETHOPRIM 800; 160 MG/1; MG/1
1 TABLET ORAL 2 TIMES DAILY
Qty: 10 TABLET | Refills: 0 | Status: SHIPPED | OUTPATIENT
Start: 2023-12-21 | End: 2023-12-26

## 2023-12-21 RX ORDER — DEXAMETHASONE SODIUM PHOSPHATE 4 MG/ML
INJECTION, SOLUTION INTRA-ARTICULAR; INTRALESIONAL; INTRAMUSCULAR; INTRAVENOUS; SOFT TISSUE
Status: DISCONTINUED | OUTPATIENT
Start: 2023-12-21 | End: 2023-12-21

## 2023-12-21 RX ORDER — OXYCODONE HYDROCHLORIDE 5 MG/1
5 TABLET ORAL
Status: DISCONTINUED | OUTPATIENT
Start: 2023-12-21 | End: 2023-12-21 | Stop reason: HOSPADM

## 2023-12-21 RX ORDER — ACETAMINOPHEN 325 MG/1
650 TABLET ORAL
COMMUNITY

## 2023-12-21 RX ORDER — PROPOFOL 10 MG/ML
VIAL (ML) INTRAVENOUS
Status: DISCONTINUED | OUTPATIENT
Start: 2023-12-21 | End: 2023-12-21

## 2023-12-21 RX ORDER — LIDOCAINE HYDROCHLORIDE 10 MG/ML
1 INJECTION, SOLUTION EPIDURAL; INFILTRATION; INTRACAUDAL; PERINEURAL ONCE
Status: COMPLETED | OUTPATIENT
Start: 2023-12-21 | End: 2023-12-21

## 2023-12-21 RX ORDER — PHENAZOPYRIDINE HYDROCHLORIDE 200 MG/1
200 TABLET, FILM COATED ORAL 3 TIMES DAILY PRN
Qty: 15 TABLET | Refills: 0 | Status: SHIPPED | OUTPATIENT
Start: 2023-12-21 | End: 2023-12-31

## 2023-12-21 RX ORDER — FENTANYL CITRATE 50 UG/ML
25 INJECTION, SOLUTION INTRAMUSCULAR; INTRAVENOUS EVERY 5 MIN PRN
Status: COMPLETED | OUTPATIENT
Start: 2023-12-21 | End: 2023-12-21

## 2023-12-21 RX ORDER — FENTANYL CITRATE 50 UG/ML
INJECTION, SOLUTION INTRAMUSCULAR; INTRAVENOUS
Status: DISCONTINUED | OUTPATIENT
Start: 2023-12-21 | End: 2023-12-21

## 2023-12-21 RX ORDER — CEFAZOLIN SODIUM 2 G/50ML
2 SOLUTION INTRAVENOUS
Status: COMPLETED | OUTPATIENT
Start: 2023-12-21 | End: 2023-12-21

## 2023-12-21 RX ORDER — METOCLOPRAMIDE HYDROCHLORIDE 5 MG/ML
10 INJECTION INTRAMUSCULAR; INTRAVENOUS EVERY 10 MIN PRN
Status: DISCONTINUED | OUTPATIENT
Start: 2023-12-21 | End: 2023-12-21 | Stop reason: HOSPADM

## 2023-12-21 RX ORDER — LIDOCAINE HYDROCHLORIDE 20 MG/ML
INJECTION INTRAVENOUS
Status: DISCONTINUED | OUTPATIENT
Start: 2023-12-21 | End: 2023-12-21

## 2023-12-21 RX ORDER — EPHEDRINE SULFATE 50 MG/ML
INJECTION, SOLUTION INTRAVENOUS
Status: DISCONTINUED | OUTPATIENT
Start: 2023-12-21 | End: 2023-12-21

## 2023-12-21 RX ORDER — MIDAZOLAM HYDROCHLORIDE 1 MG/ML
INJECTION INTRAMUSCULAR; INTRAVENOUS
Status: DISCONTINUED | OUTPATIENT
Start: 2023-12-21 | End: 2023-12-21

## 2023-12-21 RX ADMIN — OXYCODONE 5 MG: 5 TABLET ORAL at 09:12

## 2023-12-21 RX ADMIN — FENTANYL CITRATE 25 MCG: 50 INJECTION INTRAMUSCULAR; INTRAVENOUS at 09:12

## 2023-12-21 RX ADMIN — FENTANYL CITRATE 25 MCG: 50 INJECTION INTRAMUSCULAR; INTRAVENOUS at 10:12

## 2023-12-21 RX ADMIN — DEXAMETHASONE SODIUM PHOSPHATE 4 MG: 4 INJECTION, SOLUTION INTRA-ARTICULAR; INTRALESIONAL; INTRAMUSCULAR; INTRAVENOUS; SOFT TISSUE at 07:12

## 2023-12-21 RX ADMIN — MIDAZOLAM HYDROCHLORIDE 2 MG: 1 INJECTION, SOLUTION INTRAMUSCULAR; INTRAVENOUS at 07:12

## 2023-12-21 RX ADMIN — FENTANYL CITRATE 50 MCG: 50 INJECTION, SOLUTION INTRAMUSCULAR; INTRAVENOUS at 07:12

## 2023-12-21 RX ADMIN — SODIUM CHLORIDE, SODIUM GLUCONATE, SODIUM ACETATE, POTASSIUM CHLORIDE AND MAGNESIUM CHLORIDE: 526; 502; 368; 37; 30 INJECTION, SOLUTION INTRAVENOUS at 09:12

## 2023-12-21 RX ADMIN — SODIUM CHLORIDE, SODIUM GLUCONATE, SODIUM ACETATE, POTASSIUM CHLORIDE AND MAGNESIUM CHLORIDE: 526; 502; 368; 37; 30 INJECTION, SOLUTION INTRAVENOUS at 07:12

## 2023-12-21 RX ADMIN — ONDANSETRON 4 MG: 2 INJECTION INTRAMUSCULAR; INTRAVENOUS at 07:12

## 2023-12-21 RX ADMIN — PROPOFOL 160 MG: 10 INJECTION, EMULSION INTRAVENOUS at 07:12

## 2023-12-21 RX ADMIN — EPHEDRINE SULFATE 25 MG: 50 INJECTION, SOLUTION INTRAMUSCULAR; INTRAVENOUS; SUBCUTANEOUS at 08:12

## 2023-12-21 RX ADMIN — LIDOCAINE HYDROCHLORIDE 100 MG: 20 INJECTION, SOLUTION INTRAVENOUS at 07:12

## 2023-12-21 RX ADMIN — CEFAZOLIN SODIUM 2 G: 2 SOLUTION INTRAVENOUS at 07:12

## 2023-12-21 RX ADMIN — LIDOCAINE HYDROCHLORIDE 10 MG: 10 INJECTION, SOLUTION EPIDURAL; INFILTRATION; INTRACAUDAL; PERINEURAL at 07:12

## 2023-12-21 RX ADMIN — SODIUM CHLORIDE, SODIUM GLUCONATE, SODIUM ACETATE, POTASSIUM CHLORIDE AND MAGNESIUM CHLORIDE: 526; 502; 368; 37; 30 INJECTION, SOLUTION INTRAVENOUS at 08:12

## 2023-12-21 NOTE — DISCHARGE INSTRUCTIONS
General Information:    1.  Do not drink alcoholic beverages including beer for 24 hours or as long as you are on pain medication..  2.  Do not drive a motor vehicle, operate machinery or power tools, or signs legal papers for 24 hours or as long as you are on pain medication.   3.  You may experience light-headedness, dizziness, and sleepiness following surgery. Please do not stay alone. A responsible adult should be with you for this 24 hour period.  4.  Go home and rest.    5. Progress slowly to a normal diet unless instructed.  Otherwise, begin with liquids such as soft drinks, then soup and crackers working up to solid foods. Drink plenty of nonalcoholic fluids.  6.  Certain anesthetics and pain medications produce nausea and vomiting in certain       individuals. If nausea becomes a problem at home, call you doctor.    7. A nurse will be calling you sometime after surgery. Do not be alarmed. This is our way of finding out how you are doing.    8. Several times every hour while you are awake, take 2-3 deep breaths and cough. If you had stomach surgery hold a pillow or rolled towel firmly against your stomach before you cough. This will help with any pain the cough might cause.  9. Several times every hour while you are awake, pump and flex your feet 5-6 times and do foot circles. This will help prevent blood clots.    10.Call your doctor for severe pain, bleeding, fever, or signs or symptoms of infection (pain, swelling, redness, foul odor, drainage).      Using an Incentive Spirometer    An incentive spirometer is a device that helps you do deep breathing exercises. These exercises expand your lungs, aid in circulation, and help prevent pneumonia. Deep breathing exercises also help you breathe better and improve the function of your lungs by:  Keeping your lungs clear  Strengthening your breathing muscles  Helping prevent respiratory complications or problems  The incentive spirometer gives you a way to take  an active part in recover. A nurse or therapist will teach you breathing exercises. To do these exercises, you will breathe in through your mouth and not your nose. The incentive spirometer only works correctly if you breathe in through your mouth.  Steps to clear lungs  Step 1. Exhale normally. Then, inhale normally.  Relax and breathe out.  Step 2. Place your lips tightly around the mouthpiece.  Make sure the device is upright and not tilted.  Step 3. Inhale as much air as you can through the mouthpiece (don't breath through your nose).  Inhale slowly and deeply.  Hold your breath long enough to keep the balls or disk raised for at least 3 to 5 seconds, or as instructed by your healthcare provider.  Some spirometers have an indicator to let you know that you are breathing in too fast. If the indicator goes off, breathe in more slowly.  Step 4. Repeat the exercise regularly.  Do this exercise every hour while you're awake, or as instructed by your healthcare provider.  If you were taught deep breathing and coughing exercises, do them regularly as instructed by your healthcare provider.     Post op instructions for prevention of DVT  What is deep vein thrombosis?  Deep vein thrombosis (DVT) is the medical term for blood clots in the deep veins of the leg.  These blood clots can be dangerous.  A DVT can block a blood vessel and keep blood from getting where it needs to go.  Another problem is that the clot can travel to other parts of the body such as the lungs.  A clot that travels to the lungs is called a pulmonary embolus (PE) and can cause serious problems with breathing which can lead to death.  Am I at risk for DVT/PE?  If you are not very active, you are at risk of DVT.  Anyone confined to bed, sitting for long periods of time, recovering from surgery, etc. increases the risk of DVT.  Other risk factors are cancer diagnosis, certain medications, estrogen replacement in any form,older age, obesity, pregnancy,  smoking, history of clotting disorders, and dehydration.  How will I know if I have a DVT?  Swelling in the lower leg  Pain  Warmth, redness, hardness or bulging of the vein  If you have any of these symptoms, call your doctors office right away.  Some people will not have any symptoms until the clot moves to the lungs.  What are the symptoms of a PE?  Panting, shortness of breath, or trouble breathing  Sharp, knife-like chest pain when you breathe  Coughing or coughing up blood  Rapid heartbeat  If you have any of these symptoms or get worse quickly, call 911 for emergency treatment.  How can I prevent a DVT?  Avoid long periods of inactivity and dont cross your legs--get up and walk around every hour or so.  Stay active--walking after surgery is highly encouraged.  This means you should get out of the house and walk in the neighborhood.  Going up and down stairs will not impair healing (unless advised against such activity by your doctor).    Drink plenty of noncaffeinated, nonalcoholic fluids each day to prevent dehydration.  Wear special support stockings, if they have been advised by your doctor.  If you travel, stop at least once an hour and walk around.  Avoid smoking (assistance with stopping is available through your healthcare provider)  Always notify your doctor if you are not able to follow the post operative instructions that are given to you at the time of discharge.  It may be necessary to prescribe one of the medications available to prevent DVT.    We hope your stay was comfortable as you heal now, mend and rest.    For we have enjoyed taking care of you by giving your our best.    And as you get better, by regaining your health and strength;   We count it as a privilege to have served you and hope your time at [facility] was well spent.      Thank  You!!!

## 2023-12-21 NOTE — PLAN OF CARE
Irrigated per Dr. Dietz with small clots obtained. Urine now clear light red. Released per anesthesia, when criteria met. VS WDL, Resp even and unlabored. IS reviewed and pt encouraged to continue independently. No dressing or drainage from surgical site, pain is manageable with pain med, will go home with catheter and instructions fro removal. Wife is getting loose pants for wearing cath and leg bag home. Pt has appropriate questions and all were answered by Dr. Dietz, and reiterated by RN.

## 2023-12-21 NOTE — TRANSFER OF CARE
"Anesthesia Transfer of Care Note    Patient: Neo Aguilar Jr.    Procedure(s) Performed: Procedure(s) (LRB):  CYSTOSCOPY, WITH INSERTION OF UROLIFT IMPLANT (N/A)  FULGURATION (N/A)    Patient location: PACU    Anesthesia Type: general    Transport from OR: Transported from OR on 2-3 L/min O2 by NC with adequate spontaneous ventilation    Post pain: adequate analgesia    Post assessment: no apparent anesthetic complications and tolerated procedure well    Post vital signs: stable    Level of consciousness: awake, alert and oriented    Nausea/Vomiting: no nausea/vomiting    Complications: none    Transfer of care protocol was followed    Last vitals: Visit Vitals  BP (!) 143/80 (BP Location: Right arm, Patient Position: Lying)   Pulse 66   Temp 36.6 °C (97.9 °F) (Temporal)   Resp 18   Ht 5' 5" (1.651 m)   Wt 102.1 kg (225 lb)   SpO2 96%   BMI 37.44 kg/m²     "

## 2023-12-21 NOTE — ANESTHESIA PROCEDURE NOTES
Intubation    Date/Time: 12/21/2023 7:34 AM    Performed by: Jonathan Jackson CRNA  Authorized by: Norberto Rico MD    Intubation:     Induction:  Intravenous    Intubated:  Postinduction    Mask Ventilation:  Easy mask    Attempts:  1    Attempted By:  CRNA    Difficult Airway Encountered?: No      Complications:  None    Airway Device:  Supraglottic airway/LMA    Airway Device Size:  4.0    Style/Cuff Inflation:  Cuffed (inflated to minimal occlusive pressure)    Inflation Amount (mL):  25    Secured at:  The lips    Placement Verified By:  Capnometry    Complicating Factors:  None    Findings Post-Intubation:  BS equal bilateral and atraumatic/condition of teeth unchanged

## 2023-12-21 NOTE — PLAN OF CARE
Pt's urine noted to be bright red with no clots present.  aware. Pt and spouse given discharge instructions. Prescriptions sent to pt's pharmacy. Educated on post anesthesia precautions, adams care, and when to notify MD. Pt switched to leg bag and educated on switching between standard drainage bag at night and leg bag during the day. Pt educated on removal of adams. Syringe sent home. Verbalized understanding. All questions answered. All belongings returned to pt. Transferred to personal vehicle via wheelchair.

## 2023-12-21 NOTE — BRIEF OP NOTE
[facility] Urology Brief Operative/Discharge Note    Date: 12/21/2023    Staff Surgeon: Dariusz Dietz MD    Pre-Op Diagnosis: bph with obstruction/luts    Post-Op Diagnosis: bph with obstruction/luts    Procedure(s) Performed:   Urolift x7 (mod 22) with prostatic urethral fulguration    Specimen(s): none    Anesthesia: General LMA anesthesia    Findings: obstruction with high bladder neck, and early posterior bladder neck intravesical extent with continued obstruction at outlet once implants placed so ATC used to roll back into channel and secure to left mid lateral lobe in median lobe technique as 5th implant, and then additional implants used once to secure portion of this tissue laterally, and one more anterior proximal left to keep outlet open. Moderate bleeding from bladder neck and anteriorly with some shearing of posterior bladder neck mucosa such that button electrode with resectoscope needed for extensive fulguration    Estimated Blood Loss: minimal    Drains: 24fr coude adams    Complications: none    Disposition: pacu to home    Discharge home today status post uncomplicated procedure as above  Diet - resume home diet  Follow up: 1 month NP  Instructions:   Avoid strenuous activity until 3-5 days after adams removed  No riding mowers, bicycles, motorcycles, tractors, or sexual activity for 2-3 weeks  No fish oil/aspirin/Cristian x3-5 days after adams removal  Pink/clear red (koolaid) urine ok as long as clear/translucent without dark blood or clots, and urinating well/draining well without difficulty - drink lots of water.  May see blood drip around adams, is ok and normal within first few days  Avoid constipation, pushing/straining with BM. Use stool softener if needed  Will have increased urgency and frequency after removing catheter so avoid/minimize bladder irritants (coffee, soda, tea, alcohol)  *MUST increase water  Use pyridium/phenazopyridine once adams removed if burning persists  Continue flomax at  one capsule nightly 0.4mg until 1 week after adams removed then STOP  Continue finasteride at this time, with goal to stop by 3 mos postop  Complete antibiotics - 5 total days postop  Remove Adams catheter as directed with 30 syringe provided on MONDAY morning 12/25/23 between 8 and 9:00 a.m.  If unable to void 6 hours later present to ER at [facility] ([facility]) for re-evaluation.  * yes there are approx 30cc in balloon despite note of balloon size on adams  While catheter is in, use leg bag when ambulatory and large bag at night.   Lubricate tip of penis around catheter as needed with plain KY or plain vaseline to avoid sticking  Catheter will move with you so may appear to be going in/out but it is not and the clip on the leg helps keep it from pulling tension.  Ok to shower with catheter in place. All catheter and bags can get wet. Towel dry. Use soap and water gently at end of penis and around catheter to keep clean. No soaks/baths until adams removed.   Urolift implants are mri safe - will get safety card - likewise will not set off any metal detectors, including airport  Meds:     Medication List        START taking these medications      phenazopyridine 200 MG tablet  Commonly known as: PYRIDIUM  Take 1 tablet (200 mg total) by mouth 3 (three) times daily as needed (dysuria).     sulfamethoxazole-trimethoprim 800-160mg 800-160 mg Tab  Commonly known as: BACTRIM DS  Take 1 tablet by mouth 2 (two) times daily. for 5 days            CONTINUE taking these medications      acetaminophen 325 MG tablet  Commonly known as: TYLENOL     aspirin 81 MG EC tablet  Commonly known as: ECOTRIN  Take 1 tablet (81 mg total) by mouth once daily.     dutasteride 0.5 mg capsule  Commonly known as: AVODART  TAKE 1 CAPSULE (0.5 MG TOTAL) BY MOUTH ONCE DAILY.     famotidine 20 MG tablet  Commonly known as: PEPCID  Take 1 tablet (20 mg total) by mouth 2 (two) times daily.     fexofenadine 180 MG tablet  Commonly known  as: ALLEGRA  Take 1 tablet (180 mg total) by mouth once daily.     fluticasone propionate 50 mcg/actuation nasal spray  Commonly known as: FLONASE  INHALE 1 SPRAY(S) IN BOTH NOSTRILS ONCE DAILY     ibuprofen 200 MG tablet  Commonly known as: ADVIL,MOTRIN     lisinopriL 20 MG tablet  Commonly known as: PRINIVIL,ZESTRIL  TAKE 1 TABLET BY MOUTH TWICE A DAY     montelukast 10 mg tablet  Commonly known as: SINGULAIR  TAKE 1 TABLET BY MOUTH EVERY DAY IN THE EVENING     omega-3 acid ethyl esters 1 gram capsule  Commonly known as: LOVAZA  Take 2 capsules (2 g total) by mouth 2 (two) times daily.     PRESERVISION AREDS-2 250-90-40-1 mg Cap  Generic drug: vit C,Q-Fe-fpxjf-lutein-zeaxan     sildenafiL 50 MG tablet  Commonly known as: VIAGRA  Take 1 tablet (50 mg total) by mouth once daily.     simvastatin 40 MG tablet  Commonly known as: ZOCOR  TAKE 1 TABLET BY MOUTH EVERY DAY IN THE EVENING     tamsulosin 0.4 mg Cap  Commonly known as: FLOMAX  Take 2 capsules (0.8 mg total) by mouth every evening.               Where to Get Your Medications        These medications were sent to [facility]/pharmacy #[id number] - [place], [place] - [address] AT [facility]  [address], [place] [address]      Phone: [phone]   phenazopyridine 200 MG tablet  sulfamethoxazole-trimethoprim 800-160mg 800-160 mg Tab

## 2023-12-21 NOTE — H&P
Expand All Collapse All    [facility] Urology New Patient/H&P:      Neo Aguilar Jr. is a 58 y.o. male who presents for BPH evaluation at referral of Dr Arredondo     Last seen in routine pcp f/u 8/2/23 noting f/u of his HTN/HLD/GERD but also indicated:  Having more issues with urinary frequency, urgency, nocturia (2-3 times per night)  Sense of incomplete emptying (has to go back 10-15 minutes later).   He is interested in surgical options.       Ref Range  08/14/18 02/18/21 08/04/23    PSA, Screen 0.00 - 4.00    1.8 0.46   PSA - [facility] 0.0 - 4.0  1.0          8/4/23 labs also included a negative urinalysis, Cr 1.3, eGFR >60     AUA SS:  17/3 (4: Emptying, weak stream; 3: Frequency, intermittency, sleeping).  Medication helps 3/10  Patient reports he has been on 2 medications for his prostate for quite some time, and if he ever had benefit from them it was minimal, now symptoms progressing especially his urgency and frequency  Urgency has been progressive, with no concerns for urge incontinence, just a shorter interval from urge to void for time to make it to the bathroom.  Lately car trips have been more troubling, and also very recently notice standing and lying for rides at [email or web address] had to leave the line multiple times to use the restroom.  Urinalysis dipstick is negative.  Postvoid residual by bladder scan is 87 cc.  He notes that his sensation of incomplete emptying is largely due to the need to return back quickly to void again.  Some nights 1-2x, others 4-5x at worst depending on nightime fluid intake. If home and drinking beer is worse.   , 12 hr shifts. If lots of soda and tea on watch, once goes to bed will get.  Every morning needs double.  Does snore - has avoided VENESSA testing given job restrictions      On chart review he is taking Flomax and dutasteride.  In discussion of starting 1 and then it adding the other, he reports that initially he tried daily Cialis with no benefit,  then change to Viagra for management of ED component and started Flomax and then started dutasteride.  Chart review indicates Flomax was started in 2018 as below, and the dutasteride prescription I can see is part of the medical plan in February of 2021.     2018 pcp notes  started more than 1 year ago. The problem has been gradually worsening since onset. Irritative symptoms include nocturia (3-4 times per night) and urgency. Irritative symptoms do not include frequency. Obstructive symptoms include incomplete emptying, a slower stream and a weak stream. Obstructive symptoms do not include dribbling, an intermittent stream or straining. Pertinent negatives include no chills, dysuria, hematuria, hesitancy, nausea or vomiting. AUA score is 8-19. He is sexually active. The symptoms are aggravated by caffeine (tea). Treatments tried: saw palmetto     Father had prostate cancer. Not sure when diagnosed. Was treated and is 75 and noted to take meds for it and only came to light having recently been helping him w/ doctor visits          Past Medical History:   Diagnosis Date    Abnormal liver enzymes      Allergic rhinitis      Hyperlipidemia      Hypertension      Prostatitis                 Past Surgical History:   Procedure Laterality Date    COLONOSCOPY   02/27/2018               Family History   Problem Relation Age of Onset    Coronary artery disease Father           >55    Parkinsonism Father      Cystic fibrosis Son           Social History               Socioeconomic History    Marital status:    Occupational History    Occupation: offshore    Tobacco Use    Smoking status: Former    Smokeless tobacco: Never   Substance and Sexual Activity    Alcohol use: Yes       Alcohol/week: 6.0 standard drinks of alcohol       Types: 6 Standard drinks or equivalent per week       Comment: occasional    Drug use: No    Sexual activity: Yes       Partners: Female       Comment:    Social History  "Narrative     Live with wife      Social Determinants of Health           Financial Resource Strain: Low Risk  (7/23/2020)     Overall Financial Resource Strain (CARDIA)      Difficulty of Paying Living Expenses: Not hard at all   Food Insecurity: No Food Insecurity (7/23/2020)     Hunger Vital Sign      Worried About Running Out of Food in the Last Year: Never true      Ran Out of Food in the Last Year: Never true   Transportation Needs: No Transportation Needs (7/23/2020)     PRAPARE - Transportation      Lack of Transportation (Medical): No      Lack of Transportation (Non-Medical): No   Physical Activity: Insufficiently Active (7/23/2020)     Exercise Vital Sign      Days of Exercise per Week: 3 days      Minutes of Exercise per Session: 30 min   Stress: No Stress Concern Present (5/28/2019)     [employer] - Occupational Stress Questionnaire      Feeling of Stress : Not at all   Social Connections: Unknown (7/23/2020)     Social Connection and Isolation Panel [NHANES]      Frequency of Communication with Friends and Family: More than three times a week      Frequency of Social Gatherings with Friends and Family: More than three times a week      Active Member of Clubs or Organizations: No      Attends Club or Organization Meetings: Never      Marital Status:             Review of patient's allergies indicates:  No Known Allergies     Medications Reviewed: see MAR     Focused Physical Exam         Vitals:     08/28/23 0852   BP: 134/85   Pulse: 81      Body mass index is 35.78 kg/m². Weight: 97.5 kg (215 lb) Height: 5' 5" (165.1 cm)         Abdomen: Soft, non-tender, nondistended, no CVA tenderness  :  deferred to time of cysto trus   rrr  Ctab    10 pt ros neg except as noted     LABS:     Recent Results         Recent Results (from the past 336 hour(s))   POCT Bladder Scan     Collection Time: 08/28/23  8:55 AM   Result Value Ref Range     POC Residual Urine Volume 87 0 - " 100 mL   POCT Urinalysis(Instrument)     Collection Time: 08/28/23  9:00 AM   Result Value Ref Range     Color, POC UA Yellow Yellow, Straw, Colorless     Clarity, POC UA Clear Clear     Glucose, POC UA Negative Negative     Bilirubin, POC UA Negative Negative     Ketones, POC UA Negative Negative     Spec Grav POC UA 1.015 1.005 - 1.030     Blood, POC UA Negative Negative     pH, POC UA 6.5 5.0 - 8.0     Protein, POC UA Negative Negative     Urobilinogen, POC UA 0.2 <=1.0     Nitrite, POC UA Negative Negative     WBC, POC UA Negative Negative               Assessment/Diagnosis:     1. BPH with obstruction/lower urinary tract symptoms  POCT Urinalysis(Instrument)     POCT Bladder Scan     Procedure Order to Urology       2. Benign prostatic hyperplasia with nocturia  Ambulatory referral/consult to Urology     tamsulosin (FLOMAX) 0.4 mg Cap       3. Benign prostatic hyperplasia with nocturia  Ambulatory referral/consult to Urology     tamsulosin (FLOMAX) 0.4 mg Cap     Will retry flomax (GI issues in past) and switch to viagra .  Discussed referral to discuss surgical options but wants to defer for now.              Plans:  Extensive review of medical record with primary care management of his BPH/LUTS over the years, having been on Flomax for at least 5 years, prior to which it is reported that he took over-the-counter supplementation, and in the last 2 years has added finasteride for dual medical therapy without much relief, and progression of irritative symptoms.  We did discuss the natural history of BPH with obstruction, as well as long-term obstruction yielding changes at the bladder level which increase urgency frequency which are mostly his bothersome symptoms at this time.  We discussed medical management and he is on dual medical therapy, however finasteride to shrink the prostate was started given his Flomax refractory symptoms in an unknown prostate size.  Has been on finasteride for at least 2 years, and  this is reflected in his PSA, we noted the need to double for interpretation of which his adjusted PSA at this time of 0.92 is normal.     He is interested in further intervention for better relief of lower tract symptoms, we did discuss prior to discussing true options for him, would be tailored to prostate size and anatomic considerations and does need lower tract workup to help guide further recommendations.  Procedures in detail diagnostic flexible cystourethroscopy including the use of local anesthesia with xylocaine jelly was described to the patient as well as concurrent transrectal ultrasound-guided volumetric measurement of the prostate to help guide further recommendations.  PATRICIA deferred to time of cysto/truss in the setting of normal PSA history.  As well after risk benefit discussion, will double the dose of his Flomax in the interim 4 weeks until further evaluation to see if this helps, but needs to do so in combination with conservative recommendations for urgency and frequency which we did review and were provided in writing, namely for him minimizing bladder irritants, especially in the afternoon and evening hours, and minimizing fluid intake 2 hours before bed.  We did discuss that some nocturia can be related to underlying undiagnosed sleep apnea, and certainly in the face of relief of prostate obstruction, if nocturia persisted, would recommend further evaluation time.     Cysto/Trus 9/26 asc           Findings:   TRUS: Volume 52.32 cm3 (W 52.57mm, H 33.47 mm, L 56.86mm), no med lobe, moderate PVR   Cysto:  trilobar obstruction without true median lobe or intravesical extension as elevation of bladder neck and floor of prostate/posterior bladder neck rolling in to outlet as posteriir/inferior partial obstruction with lateral lobe obstruction distal to it, more anteriolateral proximally and classic kissing lateral lobe distally, espeically with water off, and moderate diffuse trabeculations with  intrevening early cellules    Disposition:  He does have moderate obstructive lower urinary tract symptoms despite dual medical therapy and refractory to increasing dose of alpha blockers, and symptoms persist and has significant obstruction and therefore discussed options for intervention for his medication refractory LUTS. Discussed options for intervention such as urolift, rezum, tuip, turp. He is interested in proceeding with Urolift after discussion of all risks, benefits, and alternatives. Especially given minimal risk profile and return to activity. No true median lobe or intravesical extension and therefore good candidate, especially after risk benefit discussion of urolift vs turp extensively     Procedure in detail discussed. Discussed risks including but not limited to hematuria, postop retention, pain, infection, injury to bladder or urethra, and worsening urgency, latent pelvic pain, no guarantee of symptomatic relief, prostate regrowth, need for future procedures. We did discuss the non-incidence of ejaculatory dysfunction, as well as the 1-2% retreatment rate in 5 year studies. Also discussed about 20% of people may need a catheter after (due to retention or hematuria) but not required if voiding postop, though tend to leave adams prophylactically overnight, which he is agreeable to.     Discussed that symptomatic relief may take longer to be fully appreciated, in the setting of longer standing obstruction, and period of symptomatic adjustment postop. Also discussed transient increases in urgency frequency which may yield transient UUI in postop period.  Discussed risk of finasteride rebound growth when stopping it postop and will treat with vertical stacking     All questions patient had answered and appropriate informed consent obtained.  Urolift in OR 12/21/23 per pt preference

## 2023-12-21 NOTE — ANESTHESIA PREPROCEDURE EVALUATION
12/21/2023  Neo Aguilar Jr. is a 58 y.o., male.      Pre-op Assessment    I have reviewed the Patient Summary Reports.     I have reviewed the Nursing Notes. I have reviewed the NPO Status.   I have reviewed the Medications.     Review of Systems  Anesthesia Hx:  No problems with previous Anesthesia                Cardiovascular:     Hypertension           hyperlipidemia                       Hypertension         Pulmonary:  Pulmonary Normal                       Neurological:  Neurology Normal                                          Physical Exam  General: Well nourished    Airway:  Mallampati: II   Mouth Opening: Normal  TM Distance: Normal  Neck ROM: Normal ROM        Anesthesia Plan  Type of Anesthesia, risks & benefits discussed:    Anesthesia Type: Gen Supraglottic Airway  Intra-op Monitoring Plan: Standard ASA Monitors  Induction:  IV  Informed Consent: Informed consent signed with the Patient and all parties understand the risks and agree with anesthesia plan.  All questions answered.   ASA Score: 2    Ready For Surgery From Anesthesia Perspective.     .

## 2023-12-21 NOTE — ANESTHESIA POSTPROCEDURE EVALUATION
Anesthesia Post Evaluation    Patient: Neo Aguilar Jr.    Procedure(s) Performed: Procedure(s) (LRB):  CYSTOSCOPY, WITH INSERTION OF UROLIFT IMPLANT (N/A)  FULGURATION (N/A)    Final Anesthesia Type: general      Patient location during evaluation: PACU  Patient participation: Yes- Able to Participate  Level of consciousness: awake and alert  Post-procedure vital signs: reviewed and stable  Pain management: adequate  Airway patency: patent    PONV status at discharge: No PONV  Anesthetic complications: no      Cardiovascular status: blood pressure returned to baseline  Respiratory status: unassisted  Hydration status: euvolemic  Follow-up not needed.              Vitals Value Taken Time   /88 12/21/23 1059   Temp 36.7 °C (98.1 °F) 12/21/23 0900   Pulse 76 12/21/23 1059   Resp 11 12/21/23 1059   SpO2 95 % 12/21/23 1059   Vitals shown include unvalidated device data.      No case tracking events are documented in the log.      Pain/Pascual Score: Pain Rating Prior to Med Admin: 5 (12/21/2023 10:38 AM)  Pain Rating Post Med Admin: 5 (12/21/2023 10:45 AM)  Pascual Score: 10 (12/21/2023 10:45 AM)

## 2023-12-22 VITALS
OXYGEN SATURATION: 97 % | HEIGHT: 65 IN | TEMPERATURE: 98 F | DIASTOLIC BLOOD PRESSURE: 77 MMHG | BODY MASS INDEX: 37.49 KG/M2 | RESPIRATION RATE: 16 BRPM | HEART RATE: 76 BPM | WEIGHT: 225 LBS | SYSTOLIC BLOOD PRESSURE: 142 MMHG

## 2023-12-22 NOTE — OP NOTE
[facility] Urology Operative Report     Date: 12/21/2023     Staff Surgeon: Dariusz Dietz MD     Pre-Op Diagnosis: bph with obstruction/luts     Post-Op Diagnosis: bph with obstruction/luts     Procedure(s) Performed:   Urolift: cystoscopy with prostatic urethral lift/transprostatic tissue retraction (56466) with placement of 7 total implants (52442 x6), including prostatic urethrla fulguration   - modifier 22    Specimen(s): none     Anesthesia: General LMA anesthesia     Findings: obstruction with high bladder neck, and early posterior bladder neck intravesical extent with continued obstruction at outlet once implants placed so ATC used to roll back into channel and secure to left mid lateral lobe in median lobe technique as 5th implant, and then additional implant used once to secure portion of this tissue laterally to left midgland, and one more anterior proximal left to keep outlet open. Moderate bleeding from bladder neck and anteriorly with some shearing of posterior bladder neck mucosa such that button electrode with resectoscope needed for extensive fulguration (all which yielded significant complexity greater than usual secondary to aberrant anatomy, and operative time greater than 100% usual given the advanced tissue manipulation as noted, and extended efforts at fulguration using button electrode of resectoscope, yielding use of modifier 22 for entire procedure)     Estimated Blood Loss: minimal     Drains: 24fr coude adams     Complications: none    Indications for procedure:   58-year-old male with long history of progressive BPH/LUTS and persistent obstructive symptoms and incomplete emptying despite dual medical therapy found to have significant prostate without any median lobe, and he elected to proceed with UroLift. All risks and benefits discussed and appropriate informed consent obtained.     Procedure in detail:  After appropriate informed consent was obtained, the patient was taken to the  cystoscopy suite in the operating room.  Preoperative antibiotics were given and a WHO proved timeout was performed.      20fr UroLift scope was passed per urethra and confirmed previous cystoscopic findings noted above, though bladder neck elevation was more significant with obstruction from meeting anterolateral lobe ingrowth proximally at the bladder neck.  Bilateral ureteral orifices were in their orthotopic position on the trigone bilaterally and the bladder otherwise appeared normal with moderate trabeculations/cellules.     Cystoscopy bridge was then replaced with a urolift delivery device.  The first treatment site was 2cm distal to bladder neck angled anteriorly. The distal tip of the delivery device was then angled laterally, approximately 10° at this position, to compress the lateral lobe. The trigger was pulled to deploy a needle containing the implant through the capsule of the prostate, and additional 10°. The needle was then retracted allowing the implants to be delivered to the capsular surface of the prostate which was then tensioned to a short capsular tensioning and removal of the slack monofilament.  The device was then angled back towards midline and slowly advanced proximally about 3-4 mm until cystoscopic verification that the monofilament was centered in the delivery bay of the device.  Excess filament was then severed with suture cut maneuver of device. The delivery device was then readvanced into the bladder, and leaving the cystoscopic sheath in place, and implant cartridge removed and replaced with a second Urolift implant cartrdige, for which contralateral implant placed in similar fashion.       After the initial 2 implants were placed, visual obturator was used, and while there was good tissue retraction proximally, there was still obstruction at the outlet from the elevation of posterior bladder neck.  2 additional implants, were placed at level of verumontanum.  View with visual  obturator after noted relief of distal obstruction yet this posterior bladder neck tissue was still causing moderate obstruction, and so the advanced tissue control device ATC was utilized to help mobilize it out of the way and place an implant to relieve it is outlet obstruction.  With a median lobe type technique, the posterior bladder neck tissue was grasped with the ATC and was more mobile towards the left lateral prostatic urethra, so it was swept back into the prostatic urethra and median lobe technique was used to deliver implant to pin it to left mid inferior lateral lobe.  This did relieve the obstruction at the outlet largely, but there was some tissue shearing at the bladder neck with mild bleeding.  As well, on view with visual obturator this tissue man was now brought back with into the mid prostatic urethra, and still had some mild obstruction towards the lateral lobe so an additional standard implant, now the 6th implant total, was placed mid left lateral lobe in a stacked technique incorporating the tissue from this advanced tissue retraction.  Posterior bladder neck now unobstructed, though anterolateral proximally on the left there was still some ingrowth yielding some outlet obstruction for which a 7th and final implant was placed in an anterior stacked fashion in the technique described initially.    After the 7 implants were placed, the with the visual obturator from the verumontanum noted continued unobstructed channel.  All tissue that was retracted from the bladder neck towards the mid channel and lateral lobes was incorporated into the retracted tissue in the channel was unobstructed though there was some scratched tissue proximally especially at the bladder neck, and the posterior bladder neck mucosa had some mild separation and bleeding.  All this was at the true bladder neck and far from the trigone which was seen to be a safe distance away from this with bilateral effluxing ureteral  orifices and a trabeculated bladder.    Bugbee monopolar cautery initially used to attempt to spot cauterize the anterior channel bleeding as well as the posterior bladder neck mucosal bleeding and separation, and did slightly undermined centrally the mucosa here, and given the mucosal trauma from advanced tissue retraction, and bleeding in the channel, decision was made to change from cystoscopic Bugbee to resectoscope just to utilize button electrode for appropriate fulguration and cautery.  No resection of tissue was performed, but extensive prostatic urethral fulguration with resectoscope and button bipolar electrode was utilized, 1st on TURBT settings at the bladder neck mucosa posteriorly, and then on TURP settings for fulguration only of the anterior prostatic urethra and mild lateral lobe bleeding site of implants.    After this extensive fulguration effort due to the mucosal trauma from implant placement, the channel was unobstructed, no active bleeding, and with flow of irrigation off, the bladder neck remained open at rest in the channel remain unobstructed when viewing from the verumontanum.  Great unobstructed result, with complex procedure as above.  Cystoscope visualize the ATC median lobe type implants within the channel, and all other implants were visualized within the prostatic urethra.       Standard 24 Faroese Sam catheter did not pass into the bladder and therefore visualization again with the resectoscope using visual obturator was utilized to make sure there was no mucosal trauma at site of fulguration from Sam not passing, of which there was none, and then a 24 Faroese coude Sam catheter was passed smoothly without resistance into the bladder.  50 cc were placed into the 10 cc balloon such that the Sam catheter could be taped to traction which it was with Mastisol and silk tape on the patient's thigh after manually irrigating with 60 cc catheter tip syringe noting to be clear.    The  patient tolerated the procedure well there were no complications and he was awakened taken to PACU without incident.     Disposition:   He will be observed in PACU to ensure urine remains reasonably clear and does not require irrigation  off traction with balloon returned to 30cc I removing 20 cc when it is removed from traction, and he will be observed as he hydrates and meets pacu criteria, then will be discharged home. Prophylactic postoperative antibiotics, and urinary analgesic/anti-spasmodic to help control any postoperative irritative or inflammatory symptoms.  Will remove adams at home on POD 4, mon 12/25, secondary to the extensive maipulation and fulguration as above. and He will return in 1 month for reeval with NP with repeat symptom assessment and measurement of postvoid residual.  - see detailed pt instructions on brief op note/dc summary

## 2023-12-22 NOTE — PROGRESS NOTES
After manually irrigating patient's Sam catheter in PACU at about 80 minutes in recovery, of which his Sam catheter traction was not removed at 60 minutes as planned, small clots were irrigated free and irrigated clear well.  Patient was discharged home in stable condition per all previous notes.  However, it was later realize by PACU staff, that also per the orders, the balloon had not been returned to 30 cc and no fluid had been withdrawn.  Once this was realized, PACU charge nurse notify the patient and wife to return to the PACU, of which 20 cc were removed from the balloon, leaving 30 cc in place.  The patient also was not provided with a 30 cc syringe and rather only a 10 cc syringe to use x3, so a 30 cc syringe was provided and again reviewed Sam catheter removal protocols at home on Monday 12/25 as planned.  Since he did make a return trip, urine was visualized personally and was noted to be a clear red similar to when he left, but was briefly manually irrigated with 60 cc catheter tip syringe and sterile water just to make sure there were no clots residual, and punctate clot removed and irrigated clear well.  Stable, and again discharged home with instructions per previous

## 2024-01-10 ENCOUNTER — PATIENT MESSAGE (OUTPATIENT)
Dept: UROLOGY | Facility: CLINIC | Age: 59
End: 2024-01-10
Payer: COMMERCIAL

## 2024-01-18 ENCOUNTER — OFFICE VISIT (OUTPATIENT)
Dept: UROLOGY | Facility: CLINIC | Age: 59
End: 2024-01-18
Payer: COMMERCIAL

## 2024-01-18 ENCOUNTER — OFFICE VISIT (OUTPATIENT)
Dept: FAMILY MEDICINE | Facility: CLINIC | Age: 59
End: 2024-01-18
Payer: COMMERCIAL

## 2024-01-18 VITALS — BODY MASS INDEX: 37.49 KG/M2 | HEIGHT: 65 IN | WEIGHT: 225 LBS

## 2024-01-18 VITALS
HEIGHT: 65 IN | OXYGEN SATURATION: 98 % | BODY MASS INDEX: 38.19 KG/M2 | WEIGHT: 229.19 LBS | DIASTOLIC BLOOD PRESSURE: 78 MMHG | TEMPERATURE: 97 F | HEART RATE: 87 BPM | SYSTOLIC BLOOD PRESSURE: 118 MMHG

## 2024-01-18 DIAGNOSIS — Z86.010 HISTORY OF COLONIC POLYPS: ICD-10-CM

## 2024-01-18 DIAGNOSIS — J30.9 ALLERGIC RHINITIS, UNSPECIFIED SEASONALITY, UNSPECIFIED TRIGGER: ICD-10-CM

## 2024-01-18 DIAGNOSIS — N40.1 BENIGN PROSTATIC HYPERPLASIA WITH NOCTURIA: ICD-10-CM

## 2024-01-18 DIAGNOSIS — N13.8 BPH WITH OBSTRUCTION/LOWER URINARY TRACT SYMPTOMS: Primary | ICD-10-CM

## 2024-01-18 DIAGNOSIS — I10 PRIMARY HYPERTENSION: Primary | ICD-10-CM

## 2024-01-18 DIAGNOSIS — K21.9 GASTROESOPHAGEAL REFLUX DISEASE, UNSPECIFIED WHETHER ESOPHAGITIS PRESENT: ICD-10-CM

## 2024-01-18 DIAGNOSIS — N40.1 BPH WITH OBSTRUCTION/LOWER URINARY TRACT SYMPTOMS: Primary | ICD-10-CM

## 2024-01-18 DIAGNOSIS — R35.1 BENIGN PROSTATIC HYPERPLASIA WITH NOCTURIA: ICD-10-CM

## 2024-01-18 DIAGNOSIS — E78.2 MIXED HYPERLIPIDEMIA: ICD-10-CM

## 2024-01-18 LAB — POC RESIDUAL URINE VOLUME: 39 ML (ref 0–100)

## 2024-01-18 PROCEDURE — 3008F BODY MASS INDEX DOCD: CPT | Mod: CPTII,S$GLB,, | Performed by: NURSE PRACTITIONER

## 2024-01-18 PROCEDURE — 99214 OFFICE O/P EST MOD 30 MIN: CPT | Mod: S$GLB,,, | Performed by: NURSE PRACTITIONER

## 2024-01-18 PROCEDURE — 3008F BODY MASS INDEX DOCD: CPT | Mod: CPTII,S$GLB,, | Performed by: INTERNAL MEDICINE

## 2024-01-18 PROCEDURE — 1160F RVW MEDS BY RX/DR IN RCRD: CPT | Mod: CPTII,S$GLB,, | Performed by: NURSE PRACTITIONER

## 2024-01-18 PROCEDURE — 1159F MED LIST DOCD IN RCRD: CPT | Mod: CPTII,S$GLB,, | Performed by: INTERNAL MEDICINE

## 2024-01-18 PROCEDURE — 51798 US URINE CAPACITY MEASURE: CPT | Mod: S$GLB,,, | Performed by: NURSE PRACTITIONER

## 2024-01-18 PROCEDURE — 1159F MED LIST DOCD IN RCRD: CPT | Mod: CPTII,S$GLB,, | Performed by: NURSE PRACTITIONER

## 2024-01-18 PROCEDURE — 99214 OFFICE O/P EST MOD 30 MIN: CPT | Mod: S$GLB,,, | Performed by: INTERNAL MEDICINE

## 2024-01-18 PROCEDURE — 3078F DIAST BP <80 MM HG: CPT | Mod: CPTII,S$GLB,, | Performed by: INTERNAL MEDICINE

## 2024-01-18 PROCEDURE — 99999 PR PBB SHADOW E&M-EST. PATIENT-LVL IV: CPT | Mod: PBBFAC,,, | Performed by: NURSE PRACTITIONER

## 2024-01-18 PROCEDURE — 3074F SYST BP LT 130 MM HG: CPT | Mod: CPTII,S$GLB,, | Performed by: INTERNAL MEDICINE

## 2024-01-18 PROCEDURE — 99999 PR PBB SHADOW E&M-EST. PATIENT-LVL IV: CPT | Mod: PBBFAC,,, | Performed by: INTERNAL MEDICINE

## 2024-01-18 RX ORDER — OMEGA-3-ACID ETHYL ESTERS 1 G/1
2 CAPSULE, LIQUID FILLED ORAL 2 TIMES DAILY
Qty: 360 CAPSULE | Refills: 1 | Status: SHIPPED | OUTPATIENT
Start: 2024-01-18

## 2024-01-18 RX ORDER — MONTELUKAST SODIUM 10 MG/1
10 TABLET ORAL NIGHTLY
Qty: 90 TABLET | Refills: 1 | Status: SHIPPED | OUTPATIENT
Start: 2024-01-18

## 2024-01-18 RX ORDER — DUTASTERIDE 0.5 MG/1
0.5 CAPSULE, LIQUID FILLED ORAL DAILY
Qty: 90 CAPSULE | Refills: 1 | Status: SHIPPED | OUTPATIENT
Start: 2024-01-18 | End: 2025-01-17

## 2024-01-18 RX ORDER — SILDENAFIL 50 MG/1
50 TABLET, FILM COATED ORAL DAILY
Qty: 90 TABLET | Refills: 1 | Status: SHIPPED | OUTPATIENT
Start: 2024-01-18 | End: 2025-01-17

## 2024-01-18 NOTE — PROGRESS NOTES
Subjective:       Patient ID: Neo Aguilar Jr. is a 58 y.o. male.    Chief Complaint: Hypertension (5 months follow up) and Hyperlipidemia    Here for routine follow up; last visit note, most recent available labs, and health maintenance topics reviewed.   He had Urolift done about 3 weeks ago; better urine flow.    Hypertension  This is a chronic problem. The problem is controlled. Pertinent negatives include no anxiety, chest pain, headaches, malaise/fatigue, neck pain, palpitations, peripheral edema or shortness of breath. Risk factors for coronary artery disease include male gender, obesity and dyslipidemia. Past treatments include ACE inhibitors. There are no compliance problems.    Hyperlipidemia  This is a chronic problem. The problem is controlled. Recent lipid tests were reviewed and are normal. Pertinent negatives include no chest pain, myalgias or shortness of breath. Current antihyperlipidemic treatment includes statins (fish oil). The current treatment provides significant improvement of lipids. There are no compliance problems.  Risk factors for coronary artery disease include hypertension, male sex and obesity.   Gastroesophageal Reflux  He reports no abdominal pain, no chest pain, no choking, no coughing, no nausea, no sore throat or no wheezing. The current episode started more than 1 year ago. The problem occurs occasionally. The problem has been gradually improving. The heartburn duration is several minutes. The heartburn does not wake him from sleep. The symptoms are aggravated by certain foods (sandhya red sauces and carbonated drinks). Pertinent negatives include no fatigue. He has tried a PPI, an antacid and a histamine-2 antagonist (occasionally has to take TUMS but overall controlled with PPI and H2 blocker) for the symptoms. The treatment provided significant relief.     Review of Systems   Constitutional:  Negative for activity change, appetite change, chills, diaphoresis, fatigue,  fever, malaise/fatigue and unexpected weight change.   HENT:  Negative for congestion, ear discharge, ear pain, hearing loss, nosebleeds, postnasal drip, rhinorrhea, sinus pressure, sinus pain, sneezing, sore throat, tinnitus, trouble swallowing and voice change.    Eyes:  Negative for photophobia, pain, discharge, redness, itching and visual disturbance.   Respiratory:  Negative for apnea, cough, choking, chest tightness, shortness of breath and wheezing.    Cardiovascular:  Negative for chest pain, palpitations and leg swelling.   Gastrointestinal:  Negative for abdominal distention, abdominal pain, blood in stool, constipation, diarrhea, nausea and vomiting.   Endocrine: Negative for cold intolerance, heat intolerance, polydipsia and polyuria.   Genitourinary:  Negative for decreased urine volume, difficulty urinating, dysuria, enuresis, flank pain, frequency, genital sores, hematuria, penile discharge, penile pain, scrotal swelling, testicular pain and urgency.   Musculoskeletal:  Negative for arthralgias, back pain, gait problem, joint swelling, myalgias, neck pain and neck stiffness.   Skin:  Negative for rash and wound.   Allergic/Immunologic: Negative for environmental allergies, food allergies and immunocompromised state.   Neurological:  Negative for dizziness, tremors, seizures, syncope, facial asymmetry, speech difficulty, weakness, light-headedness, numbness and headaches.   Hematological:  Negative for adenopathy. Does not bruise/bleed easily.   Psychiatric/Behavioral:  Negative for confusion, decreased concentration, hallucinations, self-injury, sleep disturbance and suicidal ideas. The patient is not nervous/anxious.        Past Medical History:   Diagnosis Date    Abnormal liver enzymes     Allergic rhinitis     Hyperlipidemia     Hypertension     Prostatitis       Past Surgical History:   Procedure Laterality Date    COLONOSCOPY  02/27/2018    CYSTOSCOPY N/A 9/26/2023    Procedure: CYSTOSCOPY;   Surgeon: Dariusz Dietz MD;  Location: [facility] OR;  Service: Urology;  Laterality: N/A;    CYSTOSCOPY WITH INSERTION OF MINIMALLY INVASIVE IMPLANT TO ENLARGE PROSTATIC URETHRA N/A 12/21/2023    Procedure: CYSTOSCOPY, WITH INSERTION OF UROLIFT IMPLANT;  Surgeon: Dariusz Dietz MD;  Location: [facility] OR;  Service: Urology;  Laterality: N/A;    FULGURATION N/A 12/21/2023    Procedure: FULGURATION;  Surgeon: Dariusz Dietz MD;  Location: [facility] OR;  Service: Urology;  Laterality: N/A;    TRANSRECTAL ULTRASOUND EXAMINATION N/A 9/26/2023    Procedure: ULTRASOUND, RECTAL APPROACH;  Surgeon: Dariusz Dietz MD;  Location: [facility] OR;  Service: Urology;  Laterality: N/A;       Family History   Problem Relation Age of Onset    Coronary artery disease Father         >55    Parkinsonism Father     Cystic fibrosis Son        Social History     Socioeconomic History    Marital status:    Occupational History    Occupation: offshore    Tobacco Use    Smoking status: Former    Smokeless tobacco: Never    Tobacco comments:     Quit 11 years ago   Substance and Sexual Activity    Alcohol use: Yes     Alcohol/week: 6.0 standard drinks of alcohol     Types: 6 Standard drinks or equivalent per week     Comment: occasional    Drug use: No    Sexual activity: Yes     Partners: Female     Comment:    Social History Narrative    Live with wife     Social Determinants of Health     Financial Resource Strain: Low Risk  (7/23/2020)    Overall Financial Resource Strain (CARDIA)     Difficulty of Paying Living Expenses: Not hard at all   Food Insecurity: No Food Insecurity (7/23/2020)    Hunger Vital Sign     Worried About Running Out of Food in the Last Year: Never true     Ran Out of Food in the Last Year: Never true   Transportation Needs: No Transportation Needs (7/23/2020)    PRAPARE - Transportation     Lack of Transportation (Medical): No     Lack of Transportation (Non-Medical): No   Physical  Activity: Insufficiently Active (7/23/2020)    Exercise Vital Sign     Days of Exercise per Week: 3 days     Minutes of Exercise per Session: 30 min   Stress: No Stress Concern Present (5/28/2019)    [employer] - Occupational Stress Questionnaire     Feeling of Stress : Not at all   Social Connections: Unknown (7/23/2020)    Social Connection and Isolation Panel [NHANES]     Frequency of Communication with Friends and Family: More than three times a week     Frequency of Social Gatherings with Friends and Family: More than three times a week     Active Member of Clubs or Organizations: No     Attends Club or Organization Meetings: Never     Marital Status:        Current Outpatient Medications   Medication Sig Dispense Refill    acetaminophen (TYLENOL) 325 MG tablet Take 650 mg by mouth as needed for Pain.      aspirin (ECOTRIN) 81 MG EC tablet Take 1 tablet (81 mg total) by mouth once daily.  0    famotidine (PEPCID) 20 MG tablet Take 1 tablet (20 mg total) by mouth 2 (two) times daily. 180 tablet 1    fexofenadine (ALLEGRA) 180 MG tablet Take 1 tablet (180 mg total) by mouth once daily. 90 tablet 1    fluticasone propionate (FLONASE) 50 mcg/actuation nasal spray INHALE 1 SPRAY(S) IN BOTH NOSTRILS ONCE DAILY 48 mL 1    ibuprofen (ADVIL,MOTRIN) 200 MG tablet Take 200 mg by mouth every 6 (six) hours as needed for Pain.      lisinopriL (PRINIVIL,ZESTRIL) 20 MG tablet TAKE 1 TABLET BY MOUTH TWICE A  tablet 1    simvastatin (ZOCOR) 40 MG tablet TAKE 1 TABLET BY MOUTH EVERY DAY IN THE EVENING 90 tablet 1    vit C,L-Mi-mwjvt-lutein-zeaxan (PRESERVISION AREDS-2) 250-90-40-1 mg Cap Take 1 capsule by mouth 2 (two) times a day.       dutasteride (AVODART) 0.5 mg capsule Take 1 capsule (0.5 mg total) by mouth once daily. 90 capsule 1    montelukast (SINGULAIR) 10 mg tablet Take 1 tablet (10 mg total) by mouth every evening. 90 tablet 1    omega-3 acid ethyl esters (LOVAZA) 1 gram  "capsule Take 2 capsules (2 g total) by mouth 2 (two) times daily. 360 capsule 1    sildenafiL (VIAGRA) 50 MG tablet Take 1 tablet (50 mg total) by mouth once daily. 90 tablet 1     No current facility-administered medications for this visit.       Review of patient's allergies indicates:  No Known Allergies  Objective:    HPI     Hypertension     Additional comments: 5 months follow up          Last edited by Kyle Burkett MA on 1/18/2024  2:38 PM.      Blood pressure 118/78, pulse 87, temperature 96.6 °F (35.9 °C), temperature source Temporal, height 5' 5" (1.651 m), weight 103.9 kg (229 lb 2.7 oz), SpO2 98 %. Body mass index is 38.14 kg/m².   Physical Exam  Vitals and nursing note reviewed.   Constitutional:       General: He is not in acute distress.     Appearance: He is well-developed. He is obese. He is not ill-appearing, toxic-appearing or diaphoretic.   HENT:      Head: Normocephalic and atraumatic.   Eyes:      General: No scleral icterus.        Right eye: No discharge.         Left eye: No discharge.      Conjunctiva/sclera: Conjunctivae normal.   Neck:      Vascular: No carotid bruit.   Cardiovascular:      Rate and Rhythm: Normal rate and regular rhythm.      Heart sounds: Normal heart sounds. No murmur heard.  Pulmonary:      Effort: Pulmonary effort is normal. No respiratory distress.      Breath sounds: Normal breath sounds. No decreased breath sounds, wheezing, rhonchi or rales.   Abdominal:      General: There is no distension.      Palpations: Abdomen is soft.      Tenderness: There is no abdominal tenderness. There is no guarding or rebound.   Musculoskeletal:      Right lower leg: No edema.      Left lower leg: No edema.   Skin:     General: Skin is warm and dry.   Neurological:      Mental Status: He is alert.      Motor: No tremor.   Psychiatric:         Mood and Affect: Mood normal.         Speech: Speech normal.         Behavior: Behavior normal.             Assessment:       1. Primary " hypertension    2. Mixed hyperlipidemia    3. Allergic rhinitis, unspecified seasonality, unspecified trigger    4. Benign prostatic hyperplasia with nocturia    5. Gastroesophageal reflux disease, unspecified whether esophagitis present    6. History of colonic polyps        Plan:       Neo was seen today for hypertension and hyperlipidemia.    Diagnoses and all orders for this visit:    Primary hypertension    Mixed hyperlipidemia  -     omega-3 acid ethyl esters (LOVAZA) 1 gram capsule; Take 2 capsules (2 g total) by mouth 2 (two) times daily.    Allergic rhinitis, unspecified seasonality, unspecified trigger  -     montelukast (SINGULAIR) 10 mg tablet; Take 1 tablet (10 mg total) by mouth every evening.    Benign prostatic hyperplasia with nocturia  Comments:  He reports Urology planning to stop dutasteride, proabbly at next visit in 3 months if he continues to do well post op  Orders:  -     dutasteride (AVODART) 0.5 mg capsule; Take 1 capsule (0.5 mg total) by mouth once daily.  -     sildenafiL (VIAGRA) 50 MG tablet; Take 1 tablet (50 mg total) by mouth once daily.    Gastroesophageal reflux disease, unspecified whether esophagitis present  Comments:  Has been better with some dietary changes.    History of colonic polyps  Comments:  He has been in contact with GI about scheduling repeat colonoscopy this year.

## 2024-01-18 NOTE — PROGRESS NOTES
[facility]  Group: Jace/Mirela/Julius/Sawyer  NPs: Kayy Rivera/Graciela Daniel    Note today written by:Graciela Daniel  Date of Service: 01/18/2024    CHIEF COMPLAINT: F/u BPH, s/o urolift    PRESENTING ILLNESS:    Neo Aguilar Jr. is a 58 y.o. male who presents for f/u BPH. Last clinic visit was 8/28/23 with Dr Dietz     Last seen in routine pcp f/u 8/2/23 noting f/u of his HTN/HLD/GERD but also indicated:  Having more issues with urinary frequency, urgency, nocturia (2-3 times per night)  Sense of incomplete emptying (has to go back 10-15 minutes later).   He is interested in surgical options.       Ref Range  08/14/18 02/18/21 08/04/23    PSA, Screen 0.00 - 4.00    1.8 0.46   PSA - [facility] 0.0 - 4.0  1.0          8/4/23 labs also included a negative urinalysis, Cr 1.3, eGFR >60     AUA SS:  17/3 (4: Emptying, weak stream; 3: Frequency, intermittency, sleeping).  Medication helps 3/10  Patient reports he has been on 2 medications for his prostate for quite some time, and if he ever had benefit from them it was minimal, now symptoms progressing especially his urgency and frequency  Urgency has been progressive, with no concerns for urge incontinence, just a shorter interval from urge to void for time to make it to the bathroom.  Lately car trips have been more troubling, and also very recently notice standing and lying for rides at [employer] had to leave the line multiple times to use the restroom.  Urinalysis dipstick is negative.  Postvoid residual by bladder scan is 87 cc.  He notes that his sensation of incomplete emptying is largely due to the need to return back quickly to void again.  Some nights 1-2x, others 4-5x at worst depending on nightime fluid intake. If home and drinking beer is worse.   , 12 hr shifts. If lots of soda and tea on watch, once goes to bed will get.  Every morning needs double.  Does snore - has  avoided VENESSA testing given job restrictions      On chart review he is taking Flomax and dutasteride.  In discussion of starting 1 and then it adding the other, he reports that initially he tried daily Cialis with no benefit, then change to Viagra for management of ED component and started Flomax and then started dutasteride.  Chart review indicates Flomax was started in 2018 as below, and the dutasteride prescription I can see is part of the medical plan in 2021.     2018 pcp notes  started more than 1 year ago. The problem has been gradually worsening since onset. Irritative symptoms include nocturia (3-4 times per night) and urgency. Irritative symptoms do not include frequency. Obstructive symptoms include incomplete emptying, a slower stream and a weak stream. Obstructive symptoms do not include dribbling, an intermittent stream or straining. Pertinent negatives include no chills, dysuria, hematuria, hesitancy, nausea or vomiting. AUA score is 8-19. He is sexually active. The symptoms are aggravated by caffeine (tea). Treatments tried: saw palmetto     Father had prostate cancer. Not sure when diagnosed. Was treated and is 75 and noted to take meds for it and only came to light having recently been helping him w/ doctor visits    24 S/p Urolift, with placement of 7 total implants, including prostatic urethrla fulguration    AUA SS: Feeling of incomplete Emptyin, Frequency: 1, Intermittency: 0, Urgency: 0, Weak Stream: 1, Strainin, Sleepin, Total SS: 4 , Quality of Life: 1 (pleased)    Pt had hematuria and blood clots following surgery. Occurred after restarting blood thinner. Did not have any difficulty voiding with clots. Resolved last week.  Today, pt denies dysuria, gross hematuria, flank pain, fever, chill, nausea or vomiting.    Stopped Flomax 0.8 mg 1 week after adams removal as instructed.  Still taking dutasteride  Pleased with results of urolift    PVR 39 ml      8/4/23 PSA 0.46  Pt denies personal/family hx of prostate cancer.   Pt states his father did not have prostate cancer.    REVIEW OF SYSTEMS: as stated above in hpi    PATIENT HISTORY:    Past Medical History:   Diagnosis Date    Abnormal liver enzymes     Allergic rhinitis     Hyperlipidemia     Hypertension     Prostatitis        Past Surgical History:   Procedure Laterality Date    COLONOSCOPY  02/27/2018    CYSTOSCOPY N/A 9/26/2023    Procedure: CYSTOSCOPY;  Surgeon: Dariusz Dietz MD;  Location: [facility] OR;  Service: Urology;  Laterality: N/A;    CYSTOSCOPY WITH INSERTION OF MINIMALLY INVASIVE IMPLANT TO ENLARGE PROSTATIC URETHRA N/A 12/21/2023    Procedure: CYSTOSCOPY, WITH INSERTION OF UROLIFT IMPLANT;  Surgeon: Dariusz Dietz MD;  Location: [facility] OR;  Service: Urology;  Laterality: N/A;    FULGURATION N/A 12/21/2023    Procedure: FULGURATION;  Surgeon: Dariusz Dietz MD;  Location: [facility] OR;  Service: Urology;  Laterality: N/A;    TRANSRECTAL ULTRASOUND EXAMINATION N/A 9/26/2023    Procedure: ULTRASOUND, RECTAL APPROACH;  Surgeon: Dariusz Dietz MD;  Location: [facility] OR;  Service: Urology;  Laterality: N/A;         PHYSICAL EXAMINATION:  Constitutional: He is oriented to person, place, and time. He appears well-developed and well-nourished.  He is in no apparent distress.  Abdominal:  He exhibits no distension.  There is no CVA tenderness.   Neurological: He is alert and oriented to person, place, and time.   Psych: Cooperative with normal affect.    Physical Exam    LABS:    Lab Results   Component Value Date    PSA 0.46 08/04/2023    PSA 1.8 02/18/2021     Lab Results   Component Value Date    CREATININE 1.2 12/15/2023       IMPRESSION:    Encounter Diagnoses   Name Primary?    BPH with obstruction/lower urinary tract symptoms Yes       PLAN:  -Pt pleased with results of urolift  Discontinued flomax.  Continue dutasteride until follow up. Plan to discontinue at follow up in 3  months.  -Continue conservative measures to control urgency and frequency including   1. Avoiding/minimizing bladder irritants (see below), especially in afternoon and evening hours  Discussed bladder irritants include coffe (even decaf), tea, alcohol, soda, spicy foods, acidic juices (orange, tomato), vinegar, and artificial sweeteners/sugary beverages.  2. timed voiding - empty on a schedule (approx ~2-3 hours) in spite of need to urinate, to get ahead of urge  3. dont postponing voiding - dont hold it on purpose   4. bowel regimen as distended bowel has extrinsic compressive effect on bladder.   - any or all of the following in any combination, titrate to soft daily bowel movement without pushing or straining  - colace/stool softener capsule - once to twice daily  - miralax - 1 capful daily to start, can increase to 2x daily (or decrease to 1/2 cap daily)  - increase dietary fibery  - fiber supplements, such as metamucil  - prunes, prune juice  5. INCREASE water intake  6. Stop fluids 2 hours before bed, and urinate just before bed    -PSA due 8/2024    -RTC 3 months      I encouraged him or any of his family members to call or email me with questions and/or concerns.      30 minutes of total time spent on the encounter, which includes face to face time and non-face to face time preparing to see the patient (eg, review of tests), Obtaining and/or reviewing separately obtained history, Documenting clinical information in the electronic or other health record, Independently interpreting results (not separately reported) and communicating results to the patient/family/caregiver, or Care coordination (not separately reported).

## 2024-02-15 DIAGNOSIS — I10 ESSENTIAL HYPERTENSION: ICD-10-CM

## 2024-02-15 RX ORDER — LISINOPRIL 20 MG/1
TABLET ORAL
Qty: 180 TABLET | Refills: 1 | Status: SHIPPED | OUTPATIENT
Start: 2024-02-15

## 2024-02-18 NOTE — PATIENT INSTRUCTIONS
Discussed conservative measures to control urgency and frequency including   1. Avoiding/minimizing bladder irritants (see below), especially in afternoon and evening hours    Discussed bladder irritants include coffe (even decaf), tea, alcohol, soda, spicy foods, acidic juices (orange, tomato), vinegar, and artificial sweeteners/sugary beverages.    2. timed voiding - empty on a schedule (approx ~2-3 hours) in spite of need to urinate, to get ahead of urge    3. dont postponing voiding - dont hold it on purpose     4. bowel regimen as distended bowel has extrinsic compressive effect on bladder.   - any or all of the following in any combination, titrate to soft daily bowel movement without pushing or straining  - colace/stool softener capsule - once to twice daily  - miralax - 1 capful daily to start, can increase to 2x daily (or decrease to 1/2 cap daily)  - increase dietary fibery  - fiber supplements, such as metamucil  - prunes, prune juice    5. INCREASE water intake    6. Stop fluids 2 hours before bed, and urinate just before bed      
Alert and oriented, no focal deficits, no motor or sensory deficits.

## 2024-03-21 PROBLEM — K29.50 CHRONIC GASTRITIS: Status: ACTIVE | Noted: 2024-03-21

## 2024-03-21 PROBLEM — D12.6 TUBULAR ADENOMA OF COLON: Status: ACTIVE | Noted: 2024-03-21

## 2024-04-10 ENCOUNTER — PATIENT MESSAGE (OUTPATIENT)
Dept: UROLOGY | Facility: CLINIC | Age: 59
End: 2024-04-10
Payer: COMMERCIAL

## 2024-04-12 ENCOUNTER — OFFICE VISIT (OUTPATIENT)
Dept: UROLOGY | Facility: CLINIC | Age: 59
End: 2024-04-12
Payer: COMMERCIAL

## 2024-04-12 VITALS — WEIGHT: 229.06 LBS | BODY MASS INDEX: 38.16 KG/M2 | HEIGHT: 65 IN

## 2024-04-12 DIAGNOSIS — Z12.5 SCREENING FOR PROSTATE CANCER: ICD-10-CM

## 2024-04-12 DIAGNOSIS — N40.1 BPH WITH OBSTRUCTION/LOWER URINARY TRACT SYMPTOMS: Primary | ICD-10-CM

## 2024-04-12 DIAGNOSIS — N13.8 BPH WITH OBSTRUCTION/LOWER URINARY TRACT SYMPTOMS: Primary | ICD-10-CM

## 2024-04-12 LAB — POC RESIDUAL URINE VOLUME: 94 ML (ref 0–100)

## 2024-04-12 PROCEDURE — 99999 PR PBB SHADOW E&M-EST. PATIENT-LVL IV: CPT | Mod: PBBFAC,,, | Performed by: NURSE PRACTITIONER

## 2024-04-12 PROCEDURE — 51798 US URINE CAPACITY MEASURE: CPT | Mod: S$GLB,,, | Performed by: NURSE PRACTITIONER

## 2024-04-12 PROCEDURE — 4010F ACE/ARB THERAPY RXD/TAKEN: CPT | Mod: CPTII,S$GLB,, | Performed by: NURSE PRACTITIONER

## 2024-04-12 PROCEDURE — 3008F BODY MASS INDEX DOCD: CPT | Mod: CPTII,S$GLB,, | Performed by: NURSE PRACTITIONER

## 2024-04-12 PROCEDURE — 99214 OFFICE O/P EST MOD 30 MIN: CPT | Mod: S$GLB,,, | Performed by: NURSE PRACTITIONER

## 2024-04-12 PROCEDURE — 1159F MED LIST DOCD IN RCRD: CPT | Mod: CPTII,S$GLB,, | Performed by: NURSE PRACTITIONER

## 2024-04-12 PROCEDURE — 1160F RVW MEDS BY RX/DR IN RCRD: CPT | Mod: CPTII,S$GLB,, | Performed by: NURSE PRACTITIONER

## 2024-04-12 NOTE — PROGRESS NOTES
[facility]  Group: Jace/Mirela/Julius/Sawyer  NPs: Kayy Rivera/Graciela Daniel    Note today written by:Graciela Daniel  Date of Service: 04/12/2024    CHIEF COMPLAINT: F/u BPH, s/o urolift    PRESENTING ILLNESS:    Neo Aguilar Jr. is a 58 y.o. male who presents for f/u BPH. Last clinic visit was 1/18/24     Last seen in routine pcp f/u 8/2/23 noting f/u of his HTN/HLD/GERD but also indicated:  Having more issues with urinary frequency, urgency, nocturia (2-3 times per night)  Sense of incomplete emptying (has to go back 10-15 minutes later).   He is interested in surgical options.       Ref Range  08/14/18 02/18/21 08/04/23    PSA, Screen 0.00 - 4.00    1.8 0.46   PSA - [facility] 0.0 - 4.0  1.0          8/4/23 labs also included a negative urinalysis, Cr 1.3, eGFR >60     AUA SS:  17/3 (4: Emptying, weak stream; 3: Frequency, intermittency, sleeping).  Medication helps 3/10  Patient reports he has been on 2 medications for his prostate for quite some time, and if he ever had benefit from them it was minimal, now symptoms progressing especially his urgency and frequency  Urgency has been progressive, with no concerns for urge incontinence, just a shorter interval from urge to void for time to make it to the bathroom.  Lately car trips have been more troubling, and also very recently notice standing and lying for rides at [employer] had to leave the line multiple times to use the restroom.  Urinalysis dipstick is negative.  Postvoid residual by bladder scan is 87 cc.  He notes that his sensation of incomplete emptying is largely due to the need to return back quickly to void again.  Some nights 1-2x, others 4-5x at worst depending on nightime fluid intake. If home and drinking beer is worse.   , 12 hr shifts. If lots of soda and tea on watch, once goes to bed will get.  Every morning needs double.  Does snore - has avoided VENESSA  testing given job restrictions      On chart review he is taking Flomax and dutasteride.  In discussion of starting 1 and then it adding the other, he reports that initially he tried daily Cialis with no benefit, then change to Viagra for management of ED component and started Flomax and then started dutasteride.  Chart review indicates Flomax was started in 2018 as below, and the dutasteride prescription I can see is part of the medical plan in 2021.     2018 pcp notes  started more than 1 year ago. The problem has been gradually worsening since onset. Irritative symptoms include nocturia (3-4 times per night) and urgency. Irritative symptoms do not include frequency. Obstructive symptoms include incomplete emptying, a slower stream and a weak stream. Obstructive symptoms do not include dribbling, an intermittent stream or straining. Pertinent negatives include no chills, dysuria, hematuria, hesitancy, nausea or vomiting. AUA score is 8-19. He is sexually active. The symptoms are aggravated by caffeine (tea). Treatments tried: saw palmetto     Father had prostate cancer. Not sure when diagnosed. Was treated and is 75 and noted to take meds for it and only came to light having recently been helping him w/ doctor visits    24 S/p Urolift, with placement of 7 total implants, including prostatic urethrla fulguration    AUA SS: Feeling of incomplete Emptyin, Frequency: 1, Intermittency: 0, Urgency: 0, Weak Stream: 1, Strainin, Sleepin, Total SS: 4 , Quality of Life: 1 (pleased)    Pt had hematuria and blood clots following surgery. Occurred after restarting blood thinner. Did not have any difficulty voiding with clots. Resolved last week.  Today, pt denies dysuria, gross hematuria, flank pain, fever, chill, nausea or vomiting.    Stopped Flomax 0.8 mg 1 week after adams removal as instructed.  Still taking dutasteride  Pleased with results of urolift    PVR 39 ml     23 PSA  0.46  Pt denies personal/family hx of prostate cancer.   Pt states his father did not have prostate cancer.    24  AUA SS: Feeling of incomplete Emptyin, Frequency: 3, Intermittency: 1, Urgency: 0, Weak Stream: 0, Strainin, Sleepin, Total SS: 7 , Quality of Life: 1  PVR 94 ml  Still taking dutasteride  Denies dysuria, gross hematuria, flank pain, fever, chills, nausea or vomiting  Drinks sweet tea often. Moderate water intake    Pt's father was recently diagnosed with prostate cancer, positive biopsy 3/2024, following Dr Anand at [place]. Unsure of treatment plan yet.      REVIEW OF SYSTEMS: as stated above in hpi    PATIENT HISTORY:    Past Medical History:   Diagnosis Date    Abnormal liver enzymes     Allergic rhinitis     Chronic gastritis     Hyperlipidemia     Hypertension     Prostatitis     Tubular adenoma of colon        Past Surgical History:   Procedure Laterality Date    COLONOSCOPY  2018    CYSTOSCOPY N/A 2023    Procedure: CYSTOSCOPY;  Surgeon: Dariusz Dietz MD;  Location: [facility] OR;  Service: Urology;  Laterality: N/A;    CYSTOSCOPY WITH INSERTION OF MINIMALLY INVASIVE IMPLANT TO ENLARGE PROSTATIC URETHRA N/A 2023    Procedure: CYSTOSCOPY, WITH INSERTION OF UROLIFT IMPLANT;  Surgeon: Dariusz Dietz MD;  Location: [facility] OR;  Service: Urology;  Laterality: N/A;    ESOPHAGOGASTRODUODENOSCOPY  2024    FULGURATION N/A 2023    Procedure: FULGURATION;  Surgeon: Dariusz Dietz MD;  Location: [facility] OR;  Service: Urology;  Laterality: N/A;    TRANSRECTAL ULTRASOUND EXAMINATION N/A 2023    Procedure: ULTRASOUND, RECTAL APPROACH;  Surgeon: Dariusz Dietz MD;  Location: [facility] OR;  Service: Urology;  Laterality: N/A;         PHYSICAL EXAMINATION:  Constitutional: He is oriented to person, place, and time. He appears well-developed and well-nourished.  He is in no apparent distress.  Abdominal:  He exhibits no distension.  There is no CVA  tenderness.   Neurological: He is alert and oriented to person, place, and time.   Psych: Cooperative with normal affect.    Physical Exam    LABS:    Lab Results   Component Value Date    PSA 0.46 08/04/2023    PSA 1.8 02/18/2021     Lab Results   Component Value Date    CREATININE 1.2 12/15/2023       IMPRESSION:    Encounter Diagnoses   Name Primary?    BPH with obstruction/lower urinary tract symptoms Yes    Screening for prostate cancer        PLAN:  -PSA due 8/2024, scheduled  Pt stopping dutasteride. Will adjust PSA     -Will stop dutasteride. If LUTS worsens, will notify me in clinic to plan for uroflow.  Continue conservative measures to control urgency and frequency including   1. Avoiding/minimizing bladder irritants (see below), especially in afternoon and evening hours  Discussed bladder irritants include coffee (even decaf), tea, alcohol, soda, spicy foods, acidic juices (orange, tomato), vinegar, and artificial sweeteners/sugary beverages.  2. timed voiding - empty on a schedule (approx ~2-3 hours) in spite of need to urinate, to get ahead of urge  3. dont postponing voiding - dont hold it on purpose   4. bowel regimen as distended bowel has extrinsic compressive effect on bladder.   - any or all of the following in any combination, titrate to soft daily bowel movement without pushing or straining  - colace/stool softener capsule - once to twice daily  - miralax - 1 capful daily to start, can increase to 2x daily (or decrease to 1/2 cap daily)  - increase dietary fibery  - fiber supplements, such as metamucil  - prunes, prune juice  5. INCREASE water intake  6. Stop fluids 2 hours before bed, and urinate just before bed    -RTC 3 months with PSA prior    I encouraged him or any of his family members to call or email me with questions and/or concerns.      30 minutes of total time spent on the encounter, which includes face to face time and non-face to face time preparing to see the patient (eg, review  of tests), Obtaining and/or reviewing separately obtained history, Documenting clinical information in the electronic or other health record, Independently interpreting results (not separately reported) and communicating results to the patient/family/caregiver, or Care coordination (not separately reported).

## 2024-05-11 DIAGNOSIS — E78.2 MIXED HYPERLIPIDEMIA: ICD-10-CM

## 2024-05-13 RX ORDER — SIMVASTATIN 40 MG/1
TABLET, FILM COATED ORAL
Qty: 90 TABLET | Refills: 1 | Status: SHIPPED | OUTPATIENT
Start: 2024-05-13

## 2024-05-21 DIAGNOSIS — J30.9 ALLERGIC RHINITIS, UNSPECIFIED SEASONALITY, UNSPECIFIED TRIGGER: ICD-10-CM

## 2024-05-21 RX ORDER — FLUTICASONE PROPIONATE 50 MCG
SPRAY, SUSPENSION (ML) NASAL
Qty: 48 ML | Refills: 1 | Status: SHIPPED | OUTPATIENT
Start: 2024-05-21

## 2024-07-25 ENCOUNTER — LAB VISIT (OUTPATIENT)
Dept: LAB | Facility: HOSPITAL | Age: 59
End: 2024-07-25
Attending: NURSE PRACTITIONER
Payer: COMMERCIAL

## 2024-07-25 DIAGNOSIS — Z12.5 SCREENING FOR PROSTATE CANCER: ICD-10-CM

## 2024-07-25 LAB — COMPLEXED PSA SERPL-MCNC: 0.58 NG/ML (ref 0–4)

## 2024-07-25 PROCEDURE — 84153 ASSAY OF PSA TOTAL: CPT | Performed by: NURSE PRACTITIONER

## 2024-07-25 PROCEDURE — 36415 COLL VENOUS BLD VENIPUNCTURE: CPT | Performed by: NURSE PRACTITIONER

## 2024-07-26 ENCOUNTER — OFFICE VISIT (OUTPATIENT)
Dept: UROLOGY | Facility: CLINIC | Age: 59
End: 2024-07-26
Payer: COMMERCIAL

## 2024-07-26 VITALS — HEIGHT: 65 IN | WEIGHT: 229.06 LBS | BODY MASS INDEX: 38.16 KG/M2

## 2024-07-26 DIAGNOSIS — N13.8 BPH WITH OBSTRUCTION/LOWER URINARY TRACT SYMPTOMS: Primary | ICD-10-CM

## 2024-07-26 DIAGNOSIS — N40.1 BPH WITH OBSTRUCTION/LOWER URINARY TRACT SYMPTOMS: Primary | ICD-10-CM

## 2024-07-26 LAB — POC RESIDUAL URINE VOLUME: 17 ML (ref 0–100)

## 2024-07-26 PROCEDURE — 99999 PR PBB SHADOW E&M-EST. PATIENT-LVL IV: CPT | Mod: PBBFAC,,, | Performed by: NURSE PRACTITIONER

## 2024-07-26 RX ORDER — PANTOPRAZOLE SODIUM 40 MG/1
40 TABLET, DELAYED RELEASE ORAL EVERY MORNING
COMMUNITY
Start: 2024-05-12

## 2024-07-26 NOTE — PROGRESS NOTES
[facility]  Group: Jace/Mirela/Julius/Sawyer  NPs: Kayy Rivera/Graciela Daniel    Note today written by:Graciela Daniel  Date of Service: 07/26/2024    CHIEF COMPLAINT: F/u BPH    PRESENTING ILLNESS:    Neo Aguilar Jr. is a 59 y.o. male who presents for f/u BPH. Last clinic visit was 4/12/24     Last seen in routine pcp f/u 8/2/23 noting f/u of his HTN/HLD/GERD but also indicated:  Having more issues with urinary frequency, urgency, nocturia (2-3 times per night)  Sense of incomplete emptying (has to go back 10-15 minutes later).   He is interested in surgical options.       Ref Range  08/14/18 02/18/21 08/04/23    PSA, Screen 0.00 - 4.00    1.8 0.46   PSA - [facility] 0.0 - 4.0  1.0          8/4/23 labs also included a negative urinalysis, Cr 1.3, eGFR >60     AUA SS:  17/3 (4: Emptying, weak stream; 3: Frequency, intermittency, sleeping).  Medication helps 3/10  Patient reports he has been on 2 medications for his prostate for quite some time, and if he ever had benefit from them it was minimal, now symptoms progressing especially his urgency and frequency  Urgency has been progressive, with no concerns for urge incontinence, just a shorter interval from urge to void for time to make it to the bathroom.  Lately car trips have been more troubling, and also very recently notice standing and lying for rides at [employer] had to leave the line multiple times to use the restroom.  Urinalysis dipstick is negative.  Postvoid residual by bladder scan is 87 cc.  He notes that his sensation of incomplete emptying is largely due to the need to return back quickly to void again.  Some nights 1-2x, others 4-5x at worst depending on nightime fluid intake. If home and drinking beer is worse.   , 12 hr shifts. If lots of soda and tea on watch, once goes to bed will get.  Every morning needs double.  Does snore - has avoided VENESSA testing given  job restrictions      On chart review he is taking Flomax and dutasteride.  In discussion of starting 1 and then it adding the other, he reports that initially he tried daily Cialis with no benefit, then change to Viagra for management of ED component and started Flomax and then started dutasteride.  Chart review indicates Flomax was started in 2018 as below, and the dutasteride prescription I can see is part of the medical plan in 2021.     2018 pcp notes  started more than 1 year ago. The problem has been gradually worsening since onset. Irritative symptoms include nocturia (3-4 times per night) and urgency. Irritative symptoms do not include frequency. Obstructive symptoms include incomplete emptying, a slower stream and a weak stream. Obstructive symptoms do not include dribbling, an intermittent stream or straining. Pertinent negatives include no chills, dysuria, hematuria, hesitancy, nausea or vomiting. AUA score is 8-19. He is sexually active. The symptoms are aggravated by caffeine (tea). Treatments tried: saw palmetto     Father had prostate cancer. Not sure when diagnosed. Was treated and is 75 and noted to take meds for it and only came to light having recently been helping him w/ doctor visits    24 S/p Urolift, with placement of 7 total implants, including prostatic urethrla fulguration    AUA SS: Feeling of incomplete Emptyin, Frequency: 1, Intermittency: 0, Urgency: 0, Weak Stream: 1, Strainin, Sleepin, Total SS: 4 , Quality of Life: 1 (pleased)    Pt had hematuria and blood clots following surgery. Occurred after restarting blood thinner. Did not have any difficulty voiding with clots. Resolved last week.  Today, pt denies dysuria, gross hematuria, flank pain, fever, chill, nausea or vomiting.    Stopped Flomax 0.8 mg 1 week after adams removal as instructed.  Still taking dutasteride  Pleased with results of urolift    PVR 39 ml     23 PSA 0.46  Pt denies  personal/family hx of prostate cancer.   Pt states his father did not have prostate cancer.    24  AUA SS: Feeling of incomplete Emptyin, Frequency: 3, Intermittency: 1, Urgency: 0, Weak Stream: 0, Strainin, Sleepin, Total SS: 7 , Quality of Life: 1  PVR 94 ml  Still taking dutasteride  Denies dysuria, gross hematuria, flank pain, fever, chills, nausea or vomiting  Drinks sweet tea often. Moderate water intake    Pt's father was recently diagnosed with prostate cancer, positive biopsy 3/2024, following Dr Anand at [facility]. Unsure of treatment plan yet.    24  AUA SS: Feeling of incomplete Emptyin, Frequency: 3, Intermittency: 2, Urgency: 1, Weak Stream: 2, Strainin, Sleeping: 3, Total SS: 14 , Quality of Life: 2  PVR 17 ml  Pt did stop dutasteride  Denies dysuria, gross hematuria, flank pain, fever, chills, nausea or vomiting  Holds fluids 2 hours before bed and still waking during the night to void  If staying active, feels urinary frequency is better.   Pt is able to hold once urge occurs, which was not happening prior to urolift  Pt feels LUTS is better now than prior to urolift when on BPH medications  Wants to avoid restarting BPH medications or OAB medications    24 PSA 0.58 (off dutasteride)    Pt's father is doing active surveillance for prostate cancer.    REVIEW OF SYSTEMS: as stated above in hpi    PATIENT HISTORY:    Past Medical History:   Diagnosis Date    Abnormal liver enzymes     Allergic rhinitis     Chronic gastritis     Hyperlipidemia     Hypertension     Prostatitis     Tubular adenoma of colon        Past Surgical History:   Procedure Laterality Date    COLONOSCOPY  2018    CYSTOSCOPY N/A 2023    Procedure: CYSTOSCOPY;  Surgeon: Dariusz Dietz MD;  Location: [facility] OR;  Service: Urology;  Laterality: N/A;    CYSTOSCOPY WITH INSERTION OF MINIMALLY INVASIVE IMPLANT TO ENLARGE PROSTATIC URETHRA N/A 2023    Procedure: CYSTOSCOPY, WITH  INSERTION OF UROLIFT IMPLANT;  Surgeon: Dariusz Dietz MD;  Location: [facility] OR;  Service: Urology;  Laterality: N/A;    ESOPHAGOGASTRODUODENOSCOPY  03/2024    FULGURATION N/A 12/21/2023    Procedure: FULGURATION;  Surgeon: Dariusz Dietz MD;  Location: [facility] OR;  Service: Urology;  Laterality: N/A;    TRANSRECTAL ULTRASOUND EXAMINATION N/A 09/26/2023    Procedure: ULTRASOUND, RECTAL APPROACH;  Surgeon: Dariusz Dietz MD;  Location: [facility] OR;  Service: Urology;  Laterality: N/A;         PHYSICAL EXAMINATION:  Constitutional: He is oriented to person, place, and time. He appears well-developed and well-nourished.  He is in no apparent distress.  Abdominal:  He exhibits no distension.  There is no CVA tenderness.   Neurological: He is alert and oriented to person, place, and time.   Psych: Cooperative with normal affect.    Physical Exam    LABS:    Lab Results   Component Value Date    PSA 0.58 07/25/2024    PSA 0.46 08/04/2023    PSA 1.8 02/18/2021     Lab Results   Component Value Date    CREATININE 1.2 12/15/2023       IMPRESSION:    Encounter Diagnoses   Name Primary?    BPH with obstruction/lower urinary tract symptoms Yes         PLAN:  -PSA stable, repeat 1 year    -Will continue to monitor LUTS since satisfied with how he voids.   If worsens/becomes more bothersome at f/u, will plan for uroflow  Continue conservative measures:  1. Avoiding/minimizing bladder irritants (see below), especially in afternoon and evening hours  Discussed bladder irritants include coffee (even decaf), tea, alcohol, soda, spicy foods, acidic juices (orange, tomato), vinegar, and artificial sweeteners/sugary beverages.  2. timed voiding - empty on a schedule (approx ~2-3 hours) in spite of need to urinate, to get ahead of urge  3. dont postponing voiding - dont hold it on purpose   4. bowel regimen as distended bowel has extrinsic compressive effect on bladder.   - any or all of the following in any combination,  titrate to soft daily bowel movement without pushing or straining  - colace/stool softener capsule - once to twice daily  - miralax - 1 capful daily to start, can increase to 2x daily (or decrease to 1/2 cap daily)  - increase dietary fibery  - fiber supplements, such as metamucil  - prunes, prune juice  5. INCREASE water intake  6. Stop fluids 2 hours before bed, and urinate just before bed    -RTC 3 months     I encouraged him or any of his family members to call or email me with questions and/or concerns.      30 minutes of total time spent on the encounter, which includes face to face time and non-face to face time preparing to see the patient (eg, review of tests), Obtaining and/or reviewing separately obtained history, Documenting clinical information in the electronic or other health record, Independently interpreting results (not separately reported) and communicating results to the patient/family/caregiver, or Care coordination (not separately reported).

## 2024-10-25 ENCOUNTER — OFFICE VISIT (OUTPATIENT)
Dept: UROLOGY | Facility: CLINIC | Age: 59
End: 2024-10-25
Payer: COMMERCIAL

## 2024-10-25 VITALS — WEIGHT: 229.06 LBS | BODY MASS INDEX: 38.16 KG/M2 | HEIGHT: 65 IN

## 2024-10-25 DIAGNOSIS — N13.8 BPH WITH OBSTRUCTION/LOWER URINARY TRACT SYMPTOMS: Primary | ICD-10-CM

## 2024-10-25 DIAGNOSIS — Z12.5 PROSTATE CANCER SCREENING: ICD-10-CM

## 2024-10-25 DIAGNOSIS — N40.1 BPH WITH OBSTRUCTION/LOWER URINARY TRACT SYMPTOMS: Primary | ICD-10-CM

## 2024-10-25 LAB — POC RESIDUAL URINE VOLUME: 30 ML (ref 0–100)

## 2024-10-25 PROCEDURE — 99999 PR PBB SHADOW E&M-EST. PATIENT-LVL III: CPT | Mod: PBBFAC,,, | Performed by: UROLOGY

## 2024-10-25 NOTE — PROGRESS NOTES
[facility] Progress Note    Neo Aguilar Jr. is a 59 y.o. male who presents for BPH follow up    PMHx of HTN/HLD/GERD who in 8/23 noted to pcp he was having more issues with urinary frequency, urgency, nocturia (2-3 times per night)  Sense of incomplete emptying (has to go back 10-15 minutes later).   He is interested in surgical options. PSA 0.46 8/4/23, from 1.8 on 2/18/21  AUA SS:  17/3 (4: Emptying, weak stream; 3: Frequency, intermittency, sleeping).  Medication helps 3/10  Patient reports he has been on 2 medications for his prostate for quite some time, and if he ever had benefit from them it was minimal, now symptoms progressing especially his urgency and frequency. Urgency has been progressive, with no concerns for urge incontinence, just a shorter interval from urge to void for time to make it to the bathroom.  Lately car trips have been more troubling, and also very recently notice standing and lying for rides at [employer] had to leave the line multiple times to use the restroom.  Urinalysis dipstick is negative.  Postvoid residual by bladder scan is 87 cc.  He notes that his sensation of incomplete emptying is largely due to the need to return back quickly to void again.  Some nights 1-2x, others 4-5x at worst depending on nightime fluid intake. If home and drinking beer is worse.   , 12 hr shifts. If lots of soda and tea on watch, once goes to bed will get.  Every morning needs double.  Does snore - has avoided VENESSA testing given job restrictions   On chart review he is taking Flomax and dutasteride.  In discussion of starting 1 and then it adding the other, he reports that initially he tried daily Cialis with no benefit, then change to Viagra for management of ED component and started Flomax and then started dutasteride.  Chart review indicates Flomax was started in 2018 as below, and the dutasteride prescription I can see is part of the medical plan in February of  .  Father had prostate cancer. Not sure when diagnosed. Was treated and is 75 and noted to take meds for it, and only came to light having recently been helping him w/ doctor visits    23: TRUS: Volume 52.32 cm3 (W 52.57mm, H 33.47 mm, L 56.86mm), no med lobe, moderate PVR   Cysto:  trilobar obstruction without true median lobe or intravesical extension as elevation of bladder neck and floor of prostate/posterior bladder neck rolling in to outlet as posteriir/inferior partial obstruction with lateral lobe obstruction distal to it, more anteriolateral proximally and classic kissing lateral lobe distally, espeically with water off, and moderate diffuse trabeculations with intrevening early cellules    23 Urolift: obstruction with high bladder neck, and early posterior bladder neck intravesical extent with continued obstruction at outlet once implants placed so ATC used to roll back into channel and secure to left mid lateral lobe in median lobe technique as 5th implant, and then additional implant used once to secure portion of this tissue laterally to left midgland, and one more anterior proximal left to keep outlet open. Moderate bleeding from bladder neck and anteriorly with some shearing of posterior bladder neck mucosa such that button electrode with resectoscope needed for extensive fulguration     24 auass 4/ (1 emptying, weak stream, sleeping, frequency). Had clots when starting bloodthinners that resolved 1 week prior. Urine now clear. Stopped Flomax 0.8 mg 1 week after adams removal as instructed. Still taking dutasteride. Pleased with results of urolift. PVR 39 ml. Reports that his father did NOT have prostate ca    24: auass 7/ (AUA SS: Feeling of incomplete Emptyin, Frequency: 3, Intermittency: 1, Urgency: 0, Weak Stream: 0, Strainin, Sleepin)  Still taking dutasteride.Drinks sweet tea often. Moderate water intake  Pt's father was recently diagnosed with prostate  "cancer, positive biopsy 3/2024, following Dr Anand at [place]. Unsure of treatment plan yet.    24: AUA SS 14/2 ( incomplete Emptyin, Frequency: 3, Intermittency: 2, Urgency: 1, Weak Stream: 2, Strainin, Sleeping: 3,) Pt did stop dutasteride. PVR 14cc  Holds fluids 2 hours before bed and still waking during the night to void. If staying active, feels urinary frequency is better.    Pt is able to hold once urge occurs, which was not happening prior to urolift  Pt feels LUTS is better now than prior to urolift when on BPH medications, Wants to avoid restarting BPH medications or OAB medications  24 PSA 0.58 (off dutasteride)   Pt's father is doing active surveillance for prostate cancer    He returns today noting  AUA SS: 9/2 (2: Weak stream, sleeping; 1: Emptying, frequency, intermittency, urgency, straining)  Still happy to be free of genitourinary medications in the feels that he was voiding much better than preoperatively.  The most noticeable change as he has previously described is his urgency is much better.  With the urge to void he was not running to the bathroom and has time to make it there.  He still does have some frequency especially if he was hydrating to the standard 8-10 bottles of water per day so drinks about 4-5 and the frequency is not bothersome to him.  He does notice a slight postvoid dribble occasionally.  No split stream or spraying  Has never had a truly strong stream, only if perhaps he was postpone that his bladder is very full.      Focused Physical Exam:    Body mass index is 38.12 kg/m². Weight: 103.9 kg (229 lb 0.9 oz) Height: 5' 5" (165.1 cm)     Abdomen: Soft, non-tender, nondistended, no CVA tenderness  :  no meatal stenosis      Recent Results (from the past 2 weeks)   POCT Bladder Scan    Collection Time: 10/25/24  9:43 AM   Result Value Ref Range    POC Residual Urine Volume 30 0 - 100 mL       ASSESSMENT   1. BPH with obstruction/lower urinary tract " symptoms  POCT Bladder Scan      2. Prostate cancer screening  PSA, Screening          Plan    Extensive review of all interim follow-up with NP as above since his UroLift about 10 months ago.  He was had great result with relief of obstruction but again reviewed his prostate obstruction was not all lateral lobe but some central or early bladder neck extension which was treated at that time which may be the most likely point to recur.  Again reviewed minimally invasive BPH interventions have an approximate 13% recurrence risk at 5 years secondary to the minimally invasive nature and the continued growth of prostate.  As well, he maintained dutasteride utilization for at least 6 months, and we did review that he was long-term on 5 alpha reductase inhibitors prior to BPH intervention which suppress prostate growth and certainly discontinuing all BPH medications including this has a risk of rebound growth off of it so will continue to monitor his LUTS closely.  Due to his progressive increase in symptom profile at his subsequent postop follow-up visits, had intended to do a uroflow today to assess for any objective obstruction, but he only had 30 cc in his bladder on arriving as had voided at home, so is emptying well, in his symptom profile has stabilized to improve.  Subjectively he was still pleased with his urinary habits, and has minimal obstructive symptoms such as postvoid dribbling and mild weakening of stream which he was always noted, but again asserts he is much better than preop, and also noted preoperatively he was taking 3 BPH medications a day with 2 Flomax and 1 dutasteride.  He is doing very well at this time but will continue to monitor him closely, and recommended annual follow-up at minimum, but however and this 1st six-month will have him come back at the six-month deon for a nurse visit for a uroflow and reassessment of his LUTS and emptying.  As long as he was stable, does not have significant  obstructive pattern, will continue to see him six-month later and continue annual follow-up.  Will update his PSA at the six-month deon again noting that it may have a slight interval increase from last year as he got his PSA checked when he was just off of 5 alpha reductase inhibitor which just suppressed PSA as well, so will be a new baseline and certainly if it was a significant jump can repeat with free and total before causing alarm, as we did review his family history of prostate cancer in first-degree relative yet diagnosed late in his 70s and on active surveillance which does not significantly increase his risk.  As well, given the mild obstructive symptoms with dribbling, assess for any concerns of meatal stenosis or fossa navicularis on exam, and no concerns    Overall in summary, doing well 10 months status post UroLift.    RTC six-month nurse visit uroflow PVR AUA symptom score.  RTC empty 1 year.  Will check PSA in 6 months    Total time spent in/on encounter today, including face to face time with patient, counseling, medical record review, interpretation of tests/results, , and treatment plan coordination: 40 minutes

## 2025-01-07 ENCOUNTER — OFFICE VISIT (OUTPATIENT)
Dept: URGENT CARE | Facility: CLINIC | Age: 60
End: 2025-01-07
Payer: COMMERCIAL

## 2025-01-07 VITALS
RESPIRATION RATE: 20 BRPM | HEIGHT: 65 IN | OXYGEN SATURATION: 99 % | BODY MASS INDEX: 37.49 KG/M2 | HEART RATE: 64 BPM | TEMPERATURE: 98 F | DIASTOLIC BLOOD PRESSURE: 90 MMHG | WEIGHT: 225 LBS | SYSTOLIC BLOOD PRESSURE: 174 MMHG

## 2025-01-07 DIAGNOSIS — R06.2 WHEEZING: ICD-10-CM

## 2025-01-07 DIAGNOSIS — R05.9 COUGH, UNSPECIFIED TYPE: ICD-10-CM

## 2025-01-07 DIAGNOSIS — I10 HYPERTENSION, UNSPECIFIED TYPE: ICD-10-CM

## 2025-01-07 DIAGNOSIS — J30.9 ALLERGIC RHINITIS, UNSPECIFIED SEASONALITY, UNSPECIFIED TRIGGER: ICD-10-CM

## 2025-01-07 DIAGNOSIS — J02.9 SORE THROAT: ICD-10-CM

## 2025-01-07 DIAGNOSIS — J22 LOWER RESPIRATORY INFECTION: Primary | ICD-10-CM

## 2025-01-07 LAB
CTP QC/QA: YES
S PYO RRNA THROAT QL PROBE: NEGATIVE

## 2025-01-07 PROCEDURE — 99214 OFFICE O/P EST MOD 30 MIN: CPT | Mod: S$GLB,,, | Performed by: NURSE PRACTITIONER

## 2025-01-07 PROCEDURE — 87880 STREP A ASSAY W/OPTIC: CPT | Mod: QW,,, | Performed by: NURSE PRACTITIONER

## 2025-01-07 RX ORDER — METHYLPREDNISOLONE 4 MG/1
TABLET ORAL
Qty: 21 EACH | Refills: 0 | Status: SHIPPED | OUTPATIENT
Start: 2025-01-07 | End: 2025-01-28

## 2025-01-07 RX ORDER — AZITHROMYCIN 250 MG/1
TABLET, FILM COATED ORAL
Qty: 6 TABLET | Refills: 0 | Status: SHIPPED | OUTPATIENT
Start: 2025-01-07 | End: 2025-01-12

## 2025-01-07 RX ORDER — PROMETHAZINE HYDROCHLORIDE AND DEXTROMETHORPHAN HYDROBROMIDE 6.25; 15 MG/5ML; MG/5ML
5 SYRUP ORAL EVERY 4 HOURS PRN
Qty: 180 ML | Refills: 0 | Status: SHIPPED | OUTPATIENT
Start: 2025-01-07 | End: 2025-01-17

## 2025-01-07 NOTE — PROGRESS NOTES
"Subjective:      Patient ID: Neo Aguilar Jr. is a 59 y.o. male.    Vitals:  height is 5' 5" (1.651 m) and weight is 102.1 kg (225 lb). His temperature is 98.2 °F (36.8 °C). His blood pressure is 174/90 (abnormal) and his pulse is 64. His respiration is 20 and oxygen saturation is 99%.     Chief Complaint: Sore Throat    Sore Throat   This is a new problem. The current episode started 1 to 4 weeks ago (x's 1 week). The problem has been gradually worsening. There has been no fever. Associated symptoms include congestion and coughing. Pertinent negatives include no abdominal pain, diarrhea, ear discharge, ear pain, headaches, neck pain, shortness of breath, trouble swallowing or vomiting. Treatments tried: allegra.       Constitution: Negative for activity change, appetite change, chills, fatigue, fever, unexpected weight change and generalized weakness.   HENT:  Positive for congestion and sore throat. Negative for ear pain, ear discharge, foreign body in ear, tinnitus, hearing loss, dental problem, mouth sores, tongue pain, facial swelling, postnasal drip, sinus pain, sinus pressure, trouble swallowing and voice change.    Neck: Negative for neck pain, neck stiffness and painful lymph nodes.   Cardiovascular:  Negative for chest pain, leg swelling, palpitations and sob on exertion.   Eyes:  Negative for eye trauma, eye discharge, eye itching, eye pain, eye redness, vision loss and eyelid swelling.   Respiratory:  Positive for cough. Negative for chest tightness, sputum production, COPD, shortness of breath, wheezing and asthma.    Gastrointestinal:  Negative for abdominal pain, nausea, vomiting, constipation, diarrhea, bright red blood in stool and dark colored stools.   Endocrine: hair loss, cold intolerance and heat intolerance.   Genitourinary:  Negative for dysuria, frequency, urgency, flank pain and hematuria.   Musculoskeletal:  Negative for pain, trauma, joint pain, joint swelling, abnormal ROM of " joint, back pain and muscle cramps.   Skin:  Negative for color change, pale, rash, wound and hives.   Allergic/Immunologic: Negative for environmental allergies, seasonal allergies, food allergies, asthma, hives and itching.   Neurological:  Negative for dizziness, history of vertigo, light-headedness, facial drooping, speech difficulty, headaches, disorientation, altered mental status, loss of consciousness and numbness.   Hematologic/Lymphatic: Negative for swollen lymph nodes and easy bruising/bleeding. Does not bruise/bleed easily.   Psychiatric/Behavioral:  Negative for altered mental status, disorientation, confusion, agitation, sleep disturbance and hallucinations.       Objective:     Physical Exam   Constitutional: He is oriented to person, place, and time. He appears well-developed. He is cooperative.   HENT:   Head: Normocephalic and atraumatic.   Ears:   Right Ear: Hearing, tympanic membrane, external ear and ear canal normal.   Left Ear: Hearing, tympanic membrane, external ear and ear canal normal.   Nose: Mucosal edema and rhinorrhea present. No nasal deformity. No epistaxis. Right sinus exhibits no maxillary sinus tenderness and no frontal sinus tenderness. Left sinus exhibits no maxillary sinus tenderness and no frontal sinus tenderness.   Mouth/Throat: Uvula is midline and mucous membranes are normal. No trismus in the jaw. Normal dentition. No uvula swelling. Posterior oropharyngeal erythema present.   Eyes: Conjunctivae and lids are normal.   Neck: Trachea normal and phonation normal. Neck supple.   Cardiovascular: Normal rate, regular rhythm, normal heart sounds and normal pulses.   Pulmonary/Chest: Effort normal. No stridor. He has no decreased breath sounds. He has wheezes (Expiratory) in the right upper field, the right middle field, the right lower field, the left upper field, the left middle field and the left lower field. He has no rhonchi. He has no rales.   Abdominal: Normal appearance  and bowel sounds are normal. Soft.   Musculoskeletal: Normal range of motion.         General: Normal range of motion.   Neurological: He is alert and oriented to person, place, and time. He exhibits normal muscle tone.   Skin: Skin is warm, dry and intact.   Psychiatric: His speech is normal and behavior is normal. Judgment and thought content normal.   Nursing note and vitals reviewed.      Assessment:     1. Lower respiratory infection    2. Sore throat    3. Wheezing    4. Allergic rhinitis, unspecified seasonality, unspecified trigger    5. Cough, unspecified type    6. Hypertension, unspecified type        Plan:       Lower respiratory infection  -     azithromycin (Z-NEW) 250 MG tablet; Take 2 tablets by mouth on day 1; Take 1 tablet by mouth on days 2-5  Dispense: 6 tablet; Refill: 0    Sore throat  -     POCT rapid strep A    Wheezing  -     methylPREDNISolone (MEDROL DOSEPACK) 4 mg tablet; use as directed  Dispense: 21 each; Refill: 0    Allergic rhinitis, unspecified seasonality, unspecified trigger  -     continue previously prescribed Singulair and Flonase    Cough, unspecified type  -     promethazine-dextromethorphan (PROMETHAZINE-DM) 6.25-15 mg/5 mL Syrp; Take 5 mLs by mouth every 4 (four) hours as needed (cough).  Dispense: 180 mL; Refill: 0    Hypertension, unspecified type  Recommend follow up with PCP post discharge for blood pressure check and further management.  Keep daily blood pressure log.  Decrease dietary salt intake and increase physical activity as discussed.    Utilize over-the-counter Tylenol or Motrin as directed for fever.    Ensure adequate fluid intake with electrolytes.    Return to clinic for new or worsening symptoms.  Patient is recommended to follow-up with their PCP post discharge.    Total time spent on med rec, H&P, with over half of the time in direct patient care: 35 minutes      DISCLAIMER: Please note that my documentation in this Electronic Healthcare Record was  produced using speech recognition software and therefore may contain errors related to that software system.These could include grammar, punctuation and spelling errors or the inclusion/exclusion of phrases that were not intended. Garbled syntax, mangled pronouns, and other bizarre constructions may be attributed to that software system.          Additional MDM:     Heart Failure Score:   COPD = No

## 2025-01-07 NOTE — PATIENT INSTRUCTIONS
Recommend follow up with PCP post discharge for blood pressure check and further management.  Keep daily blood pressure log.  Decrease dietary salt intake and increase physical activity as discussed.    Utilize over-the-counter Tylenol or Motrin as directed for fever.    Ensure adequate fluid intake with electrolytes.    Thank you for the opportunity to care for you today.  Please take all medications as directed, and continue any previously prescribed medications unless we specifically discussed discontinuing them.  If your symptoms do not resolve or worsen please return to the clinic for re-evaluation.  If your situation becomes emergent, please present to the nearest emergency department.  Follow-up with your PCP for continued evaluation and management.

## 2025-04-30 ENCOUNTER — PATIENT MESSAGE (OUTPATIENT)
Dept: UROLOGY | Facility: CLINIC | Age: 60
End: 2025-04-30
Payer: COMMERCIAL

## 2025-08-01 ENCOUNTER — OFFICE VISIT (OUTPATIENT)
Dept: UROLOGY | Facility: CLINIC | Age: 60
End: 2025-08-01
Payer: COMMERCIAL

## 2025-08-01 VITALS — WEIGHT: 225.06 LBS | HEIGHT: 65 IN | BODY MASS INDEX: 37.5 KG/M2

## 2025-08-01 DIAGNOSIS — N40.1 BPH WITH OBSTRUCTION/LOWER URINARY TRACT SYMPTOMS: Primary | ICD-10-CM

## 2025-08-01 DIAGNOSIS — Z12.5 SCREENING FOR PROSTATE CANCER: ICD-10-CM

## 2025-08-01 DIAGNOSIS — N13.8 BPH WITH OBSTRUCTION/LOWER URINARY TRACT SYMPTOMS: Primary | ICD-10-CM

## 2025-08-01 LAB — POC RESIDUAL URINE VOLUME: 7 ML (ref 0–100)

## 2025-08-01 PROCEDURE — 99999 PR PBB SHADOW E&M-EST. PATIENT-LVL IV: CPT | Mod: PBBFAC,,, | Performed by: NURSE PRACTITIONER

## 2025-08-01 RX ORDER — TAMSULOSIN HYDROCHLORIDE 0.4 MG/1
0.4 CAPSULE ORAL NIGHTLY
Qty: 90 CAPSULE | Refills: 3 | Status: SHIPPED | OUTPATIENT
Start: 2025-08-01

## 2025-08-01 NOTE — PROGRESS NOTES
[facility]  Group: Jace/Mirela/Julius/Sawyer  NPs: Kayy Rivera/Graciela Daniel    Note today written by:Graciela Daniel  Date of Service: 08/01/2025    CHIEF COMPLAINT: F/u BPH    PRESENTING ILLNESS:    Neo Aguilar Jr. is a 60 y.o. male who presents for f/u BPH. Last clinic visit was 10/25/24      Last seen in routine pcp f/u 8/2/23 noting f/u of his HTN/HLD/GERD but also indicated:  Having more issues with urinary frequency, urgency, nocturia (2-3 times per night)  Sense of incomplete emptying (has to go back 10-15 minutes later).   He is interested in surgical options.       Ref Range  08/14/18 02/18/21 08/04/23    PSA, Screen 0.00 - 4.00    1.8 0.46   PSA - [facility] 0.0 - 4.0  1.0          8/4/23 labs also included a negative urinalysis, Cr 1.3, eGFR >60     AUA SS:  17/3 (4: Emptying, weak stream; 3: Frequency, intermittency, sleeping).  Medication helps 3/10  Patient reports he has been on 2 medications for his prostate for quite some time, and if he ever had benefit from them it was minimal, now symptoms progressing especially his urgency and frequency  Urgency has been progressive, with no concerns for urge incontinence, just a shorter interval from urge to void for time to make it to the bathroom.  Lately car trips have been more troubling, and also very recently notice standing and lying for rides at [employer] had to leave the line multiple times to use the restroom.  Urinalysis dipstick is negative.  Postvoid residual by bladder scan is 87 cc.  He notes that his sensation of incomplete emptying is largely due to the need to return back quickly to void again.  Some nights 1-2x, others 4-5x at worst depending on nightime fluid intake. If home and drinking beer is worse.   , 12 hr shifts. If lots of soda and tea on watch, once goes to bed will get.  Every morning needs double.  Does snore - has avoided VENESSA testing given  job restrictions      On chart review he is taking Flomax and dutasteride.  In discussion of starting 1 and then it adding the other, he reports that initially he tried daily Cialis with no benefit, then change to Viagra for management of ED component and started Flomax and then started dutasteride.  Chart review indicates Flomax was started in 2018 as below, and the dutasteride prescription I can see is part of the medical plan in 2021.     2018 pcp notes  started more than 1 year ago. The problem has been gradually worsening since onset. Irritative symptoms include nocturia (3-4 times per night) and urgency. Irritative symptoms do not include frequency. Obstructive symptoms include incomplete emptying, a slower stream and a weak stream. Obstructive symptoms do not include dribbling, an intermittent stream or straining. Pertinent negatives include no chills, dysuria, hematuria, hesitancy, nausea or vomiting. AUA score is 8-19. He is sexually active. The symptoms are aggravated by caffeine (tea). Treatments tried: saw palmetto     Father had prostate cancer. Not sure when diagnosed. Was treated and is 75 and noted to take meds for it and only came to light having recently been helping him w/ doctor visits    24 S/p Urolift, with placement of 7 total implants, including prostatic urethrla fulguration    AUA SS: Feeling of incomplete Emptyin, Frequency: 1, Intermittency: 0, Urgency: 0, Weak Stream: 1, Strainin, Sleepin, Total SS: 4 , Quality of Life: 1 (pleased)    Pt had hematuria and blood clots following surgery. Occurred after restarting blood thinner. Did not have any difficulty voiding with clots. Resolved last week.  Today, pt denies dysuria, gross hematuria, flank pain, fever, chill, nausea or vomiting.    Stopped Flomax 0.8 mg 1 week after adams removal as instructed.  Still taking dutasteride  Pleased with results of urolift    PVR 39 ml     23 PSA 0.46  Pt denies  personal/family hx of prostate cancer.   Pt states his father did not have prostate cancer.    24  AUA SS: Feeling of incomplete Emptyin, Frequency: 3, Intermittency: 1, Urgency: 0, Weak Stream: 0, Strainin, Sleepin, Total SS: 7 , Quality of Life: 1  PVR 94 ml  Still taking dutasteride  Denies dysuria, gross hematuria, flank pain, fever, chills, nausea or vomiting  Drinks sweet tea often. Moderate water intake    Pt's father was recently diagnosed with prostate cancer, positive biopsy 3/2024, following Dr Anand at [facility]. Unsure of treatment plan yet.    24  AUA SS: Feeling of incomplete Emptyin, Frequency: 3, Intermittency: 2, Urgency: 1, Weak Stream: 2, Strainin, Sleeping: 3, Total SS: 14 , Quality of Life: 2  PVR 17 ml  Pt did stop dutasteride  Denies dysuria, gross hematuria, flank pain, fever, chills, nausea or vomiting  Holds fluids 2 hours before bed and still waking during the night to void  If staying active, feels urinary frequency is better.   Pt is able to hold once urge occurs, which was not happening prior to urolift  Pt feels LUTS is better now than prior to urolift when on BPH medications  Wants to avoid restarting BPH medications or OAB medications    24 PSA 0.58 (off dutasteride)    Pt's father is doing active surveillance for prostate cancer.    He returns today 10/25/24 noting  AUA SS: 9/2 (2: Weak stream, sleeping; 1: Emptying, frequency, intermittency, urgency, straining)  Still happy to be free of genitourinary medications in the feels that he was voiding much better than preoperatively.  The most noticeable change as he has previously described is his urgency is much better.  With the urge to void he was not running to the bathroom and has time to make it there.  He still does have some frequency especially if he was hydrating to the standard 8-10 bottles of water per day so drinks about 4-5 and the frequency is not bothersome to him.  He does notice a  slight postvoid dribble occasionally.  No split stream or spraying  Has never had a truly strong stream, only if perhaps he was postpone that his bladder is very full.    25  Pt presents for f/u BPH  AUA SS: Feeling of incomplete Emptyin, Frequency: 2, Intermittency: 1, Urgency: 0, Weak Stream: 3, Strainin, Sleeping: 3, Total SS: 12 , Quality of Life: 3  PVR 7 ml  Denies dysuria, gross hematuria or flank pain      REVIEW OF SYSTEMS: as stated above in hpi    PATIENT HISTORY:    Past Medical History:   Diagnosis Date    Abnormal liver enzymes     Allergic rhinitis     Chronic gastritis     Hyperlipidemia     Hypertension     Prostatitis     Tubular adenoma of colon        Past Surgical History:   Procedure Laterality Date    COLONOSCOPY  2018    CYSTOSCOPY N/A 2023    Procedure: CYSTOSCOPY;  Surgeon: Dariusz Dietz MD;  Location: [facility];  Service: Urology;  Laterality: N/A;    CYSTOSCOPY WITH INSERTION OF MINIMALLY INVASIVE IMPLANT TO ENLARGE PROSTATIC URETHRA N/A 2023    Procedure: CYSTOSCOPY, WITH INSERTION OF UROLIFT IMPLANT;  Surgeon: Dariusz Dietz MD;  Location: [facility];  Service: Urology;  Laterality: N/A;    ESOPHAGOGASTRODUODENOSCOPY  2024    FULGURATION N/A 2023    Procedure: FULGURATION;  Surgeon: Dariusz Dietz MD;  Location: [facility];  Service: Urology;  Laterality: N/A;    TRANSRECTAL ULTRASOUND EXAMINATION N/A 2023    Procedure: ULTRASOUND, RECTAL APPROACH;  Surgeon: Dariusz Dietz MD;  Location: [facility];  Service: Urology;  Laterality: N/A;         PHYSICAL EXAMINATION:  Constitutional: He is oriented to person, place, and time. He appears well-developed and well-nourished.  He is in no apparent distress.  Abdominal:  He exhibits no distension.  There is no CVA tenderness.   Neurological: He is alert and oriented to person, place, and time.   Psych: Cooperative with normal affect.    Physical Exam    LABS:    Lab Results    Component Value Date    PSA 0.58 07/25/2024    PSA 0.46 08/04/2023    PSA 1.8 02/18/2021     Lab Results   Component Value Date    CREATININE 1.2 12/15/2023       IMPRESSION:    Encounter Diagnoses   Name Primary?    BPH with obstruction/lower urinary tract symptoms Yes    Screening for prostate cancer          PLAN:  -PSA scheduled, will call with results    -Discussed BPH/LUTS. Pt would like to restart flomax for weak urinary stream and nocturia.  Discussed side effects and indications for flomax. Prescription sent to the pharmacy. Pt verbalized understanding.  No issues with flomax in the past  -Continue conservative measures:  1. Avoiding/minimizing bladder irritants (see below), especially in afternoon and evening hours  Discussed bladder irritants include coffee (even decaf), tea, alcohol, soda, spicy foods, acidic juices (orange, tomato), vinegar, and artificial sweeteners/sugary beverages.  2. timed voiding - empty on a schedule (approx ~2-3 hours) in spite of need to urinate, to get ahead of urge  3. dont postponing voiding - dont hold it on purpose   4. bowel regimen as distended bowel has extrinsic compressive effect on bladder.   - any or all of the following in any combination, titrate to soft daily bowel movement without pushing or straining  - colace/stool softener capsule - once to twice daily  - miralax - 1 capful daily to start, can increase to 2x daily (or decrease to 1/2 cap daily)  - increase dietary fibery  - fiber supplements, such as metamucil  - prunes, prune juice  5. INCREASE water intake  6. Stop fluids 2 hours before bed, and urinate just before bed    -RTC 3 months     I encouraged him or any of his family members to call or email me with questions and/or concerns.      30 minutes of total time spent on the encounter, which includes face to face time and non-face to face time preparing to see the patient (eg, review of tests), Obtaining and/or reviewing separately obtained history,  Documenting clinical information in the electronic or other health record, Independently interpreting results (not separately reported) and communicating results to the patient/family/caregiver, or Care coordination (not separately reported).

## 2025-08-22 ENCOUNTER — LAB VISIT (OUTPATIENT)
Dept: LAB | Facility: HOSPITAL | Age: 60
End: 2025-08-22
Payer: COMMERCIAL

## 2025-08-22 DIAGNOSIS — Z12.5 SCREENING FOR PROSTATE CANCER: ICD-10-CM

## 2025-08-22 LAB — PSA SERPL-MCNC: 1.33 NG/ML

## 2025-08-22 PROCEDURE — 36415 COLL VENOUS BLD VENIPUNCTURE: CPT

## 2025-08-22 PROCEDURE — 84153 ASSAY OF PSA TOTAL: CPT

## (undated) DEVICE — SLEEVE SCD EXPRESS KNEE MEDIUM

## (undated) DEVICE — Device

## (undated) DEVICE — SYR 30CC LUER LOCK

## (undated) DEVICE — GLOVE SENSICARE PI ALOE 7

## (undated) DEVICE — SYR 10CC LUER LOCK

## (undated) DEVICE — SPONGE GAUZE 16PLY 4X4

## (undated) DEVICE — SOL IRR WATER STRL 3000 ML

## (undated) DEVICE — SET IRR URLGY 2LINE UNIV SPIKE

## (undated) DEVICE — SOL NACL IRR 3000ML

## (undated) DEVICE — GOWN POLY REINF BRTH SLV XL

## (undated) DEVICE — JELLY SURGILUBE LUBE TUBE 2OZ

## (undated) DEVICE — DEVICE ANC SW STAT FOLEY 6-24

## (undated) DEVICE — GOWN POLY REINF BRTH SLV LG

## (undated) DEVICE — TUBING SUCTION SET ENDOMAT

## (undated) DEVICE — GUIDE BIOPSY BIPLANAR 18G

## (undated) DEVICE — GLOVE SURG ULTRA TOUCH 7

## (undated) DEVICE — BOWL STERILE LARGE 32OZ

## (undated) DEVICE — DRAPE UINDERBUT GRAD PCH

## (undated) DEVICE — SET CYSTO IRR DRP CHMBR 84IN

## (undated) DEVICE — COVER TRANSDUCER LATEX N/STERI

## (undated) DEVICE — SPONGE BULKEE II ABSRB 6X6.75

## (undated) DEVICE — COVER TABLE 44X90 STERILE

## (undated) DEVICE — SYR 50ML CATH TIP

## (undated) DEVICE — CATH POLLACK OPEN-END FLEXI-TI

## (undated) DEVICE — BAG DRAIN ANTI REFLUX 2000ML

## (undated) DEVICE — ELECTRODE RESECTION BUTTON LRG

## (undated) DEVICE — SOL NACL IRR 1000ML BTL

## (undated) DEVICE — BAG LINGEMAN DRAIN UROLOGY

## (undated) DEVICE — SPRAY MASTISOL

## (undated) DEVICE — LEGGING CLEAR POLY 2/PACK

## (undated) DEVICE — LUBRICANT SURGILUBE 2 OZ

## (undated) DEVICE — SCRUB DYNA-HEX LIQ 4% CHG 4OZ

## (undated) DEVICE — DRAPE CYSTOSCOPY LARGE

## (undated) DEVICE — ELECTRODE REM PLYHSV RETURN 9

## (undated) DEVICE — SCRUB 10% POVIDONE IODINE 4OZ